# Patient Record
Sex: FEMALE | Race: BLACK OR AFRICAN AMERICAN | Employment: UNEMPLOYED
[De-identification: names, ages, dates, MRNs, and addresses within clinical notes are randomized per-mention and may not be internally consistent; named-entity substitution may affect disease eponyms.]

---

## 2017-01-12 ENCOUNTER — OFFICE VISIT (OUTPATIENT)
Dept: OBGYN | Facility: CLINIC | Age: 27
End: 2017-01-12

## 2017-01-12 VITALS
HEART RATE: 78 BPM | DIASTOLIC BLOOD PRESSURE: 68 MMHG | SYSTOLIC BLOOD PRESSURE: 114 MMHG | HEIGHT: 65 IN | BODY MASS INDEX: 21.66 KG/M2 | WEIGHT: 130 LBS

## 2017-01-12 DIAGNOSIS — R10.2 PELVIC PAIN IN FEMALE: Primary | ICD-10-CM

## 2017-01-12 PROCEDURE — 99213 OFFICE O/P EST LOW 20 MIN: CPT | Performed by: NURSE PRACTITIONER

## 2018-03-05 ENCOUNTER — TELEPHONE (OUTPATIENT)
Dept: OBGYN CLINIC | Age: 28
End: 2018-03-05

## 2018-03-05 DIAGNOSIS — N92.6 MISSED PERIOD: Primary | ICD-10-CM

## 2018-03-14 ENCOUNTER — HOSPITAL ENCOUNTER (EMERGENCY)
Age: 28
Discharge: HOME | End: 2018-03-14
Attending: EMERGENCY MEDICINE
Payer: MEDICAID

## 2018-03-14 ENCOUNTER — APPOINTMENT (OUTPATIENT)
Dept: ULTRASOUND IMAGING | Age: 28
End: 2018-03-14
Payer: MEDICAID

## 2018-03-14 VITALS
DIASTOLIC BLOOD PRESSURE: 73 MMHG | SYSTOLIC BLOOD PRESSURE: 111 MMHG | WEIGHT: 130 LBS | HEART RATE: 89 BPM | TEMPERATURE: 97 F | OXYGEN SATURATION: 98 % | RESPIRATION RATE: 20 BRPM | BODY MASS INDEX: 21.63 KG/M2

## 2018-03-14 DIAGNOSIS — O21.9 VOMITING OF PREGNANCY, ANTEPARTUM: Primary | ICD-10-CM

## 2018-03-14 LAB
-: ABNORMAL
ABSOLUTE EOS #: 0.23 K/UL (ref 0–0.44)
ABSOLUTE IMMATURE GRANULOCYTE: 0.04 K/UL (ref 0–0.3)
ABSOLUTE LYMPH #: 2.72 K/UL (ref 1.1–3.7)
ABSOLUTE MONO #: 0.43 K/UL (ref 0.1–1.2)
ALBUMIN SERPL-MCNC: 4.6 G/DL (ref 3.5–5.2)
ALBUMIN/GLOBULIN RATIO: 1.2 (ref 1–2.5)
ALP BLD-CCNC: 101 U/L (ref 35–104)
ALT SERPL-CCNC: 26 U/L (ref 5–33)
AMORPHOUS: ABNORMAL
ANION GAP SERPL CALCULATED.3IONS-SCNC: 19 MMOL/L (ref 9–17)
AST SERPL-CCNC: 21 U/L
BACTERIA: ABNORMAL
BASOPHILS # BLD: 0 % (ref 0–2)
BASOPHILS ABSOLUTE: 0.03 K/UL (ref 0–0.2)
BILIRUB SERPL-MCNC: 0.24 MG/DL (ref 0.3–1.2)
BILIRUBIN DIRECT: <0.08 MG/DL
BILIRUBIN URINE: NEGATIVE
BILIRUBIN, INDIRECT: ABNORMAL MG/DL (ref 0–1)
BUN BLDV-MCNC: 10 MG/DL (ref 6–20)
BUN/CREAT BLD: ABNORMAL (ref 9–20)
CALCIUM SERPL-MCNC: 10.6 MG/DL (ref 8.6–10.4)
CASTS UA: ABNORMAL /LPF (ref 0–2)
CHLORIDE BLD-SCNC: 102 MMOL/L (ref 98–107)
CO2: 19 MMOL/L (ref 20–31)
COLOR: ABNORMAL
COMMENT UA: ABNORMAL
CREAT SERPL-MCNC: 0.61 MG/DL (ref 0.5–0.9)
CRYSTALS, UA: ABNORMAL /HPF
DIFFERENTIAL TYPE: ABNORMAL
DIRECT EXAM: NORMAL
EOSINOPHILS RELATIVE PERCENT: 2 % (ref 1–4)
EPITHELIAL CELLS UA: ABNORMAL /HPF (ref 0–5)
GFR AFRICAN AMERICAN: >60 ML/MIN
GFR NON-AFRICAN AMERICAN: >60 ML/MIN
GFR SERPL CREATININE-BSD FRML MDRD: ABNORMAL ML/MIN/{1.73_M2}
GFR SERPL CREATININE-BSD FRML MDRD: ABNORMAL ML/MIN/{1.73_M2}
GLOBULIN: ABNORMAL G/DL (ref 1.5–3.8)
GLUCOSE BLD-MCNC: 113 MG/DL (ref 70–99)
GLUCOSE URINE: NEGATIVE
HCG QUALITATIVE: POSITIVE
HCG QUANTITATIVE: ABNORMAL IU/L
HCT VFR BLD CALC: 47.4 % (ref 36.3–47.1)
HEMOGLOBIN: 15.5 G/DL (ref 11.9–15.1)
IMMATURE GRANULOCYTES: 0 %
KETONES, URINE: ABNORMAL
LEUKOCYTE ESTERASE, URINE: ABNORMAL
LIPASE: 31 U/L (ref 13–60)
LYMPHOCYTES # BLD: 28 % (ref 24–43)
Lab: NORMAL
MCH RBC QN AUTO: 29.5 PG (ref 25.2–33.5)
MCHC RBC AUTO-ENTMCNC: 32.7 G/DL (ref 28.4–34.8)
MCV RBC AUTO: 90.1 FL (ref 82.6–102.9)
MONOCYTES # BLD: 5 % (ref 3–12)
MUCUS: ABNORMAL
NITRITE, URINE: NEGATIVE
NRBC AUTOMATED: 0 PER 100 WBC
OTHER OBSERVATIONS UA: ABNORMAL
PDW BLD-RTO: 13.4 % (ref 11.8–14.4)
PH UA: 5.5 (ref 5–8)
PLATELET # BLD: 292 K/UL (ref 138–453)
PLATELET ESTIMATE: ABNORMAL
PMV BLD AUTO: 10.5 FL (ref 8.1–13.5)
POTASSIUM SERPL-SCNC: 3.9 MMOL/L (ref 3.7–5.3)
PROTEIN UA: ABNORMAL
RBC # BLD: 5.26 M/UL (ref 3.95–5.11)
RBC # BLD: ABNORMAL 10*6/UL
RBC UA: ABNORMAL /HPF (ref 0–2)
RENAL EPITHELIAL, UA: ABNORMAL /HPF
SEG NEUTROPHILS: 65 % (ref 36–65)
SEGMENTED NEUTROPHILS ABSOLUTE COUNT: 6.21 K/UL (ref 1.5–8.1)
SODIUM BLD-SCNC: 140 MMOL/L (ref 135–144)
SPECIFIC GRAVITY UA: 1.03 (ref 1–1.03)
SPECIMEN DESCRIPTION: NORMAL
STATUS: NORMAL
TOTAL PROTEIN: 8.6 G/DL (ref 6.4–8.3)
TRICHOMONAS: ABNORMAL
TURBIDITY: ABNORMAL
URINE HGB: NEGATIVE
UROBILINOGEN, URINE: NORMAL
WBC # BLD: 9.7 K/UL (ref 3.5–11.3)
WBC # BLD: ABNORMAL 10*3/UL
WBC UA: ABNORMAL /HPF (ref 0–5)
YEAST: ABNORMAL

## 2018-03-14 PROCEDURE — 85025 COMPLETE CBC W/AUTO DIFF WBC: CPT

## 2018-03-14 PROCEDURE — 87086 URINE CULTURE/COLONY COUNT: CPT

## 2018-03-14 PROCEDURE — 87591 N.GONORRHOEAE DNA AMP PROB: CPT

## 2018-03-14 PROCEDURE — 87480 CANDIDA DNA DIR PROBE: CPT

## 2018-03-14 PROCEDURE — 83690 ASSAY OF LIPASE: CPT

## 2018-03-14 PROCEDURE — 2580000003 HC RX 258: Performed by: EMERGENCY MEDICINE

## 2018-03-14 PROCEDURE — 6370000000 HC RX 637 (ALT 250 FOR IP): Performed by: EMERGENCY MEDICINE

## 2018-03-14 PROCEDURE — 99284 EMERGENCY DEPT VISIT MOD MDM: CPT

## 2018-03-14 PROCEDURE — 87510 GARDNER VAG DNA DIR PROBE: CPT

## 2018-03-14 PROCEDURE — 80076 HEPATIC FUNCTION PANEL: CPT

## 2018-03-14 PROCEDURE — 84702 CHORIONIC GONADOTROPIN TEST: CPT

## 2018-03-14 PROCEDURE — 87660 TRICHOMONAS VAGIN DIR PROBE: CPT

## 2018-03-14 PROCEDURE — 81001 URINALYSIS AUTO W/SCOPE: CPT

## 2018-03-14 PROCEDURE — 87491 CHLMYD TRACH DNA AMP PROBE: CPT

## 2018-03-14 PROCEDURE — 6360000002 HC RX W HCPCS: Performed by: EMERGENCY MEDICINE

## 2018-03-14 PROCEDURE — 76817 TRANSVAGINAL US OBSTETRIC: CPT

## 2018-03-14 PROCEDURE — 96372 THER/PROPH/DIAG INJ SC/IM: CPT

## 2018-03-14 PROCEDURE — 84703 CHORIONIC GONADOTROPIN ASSAY: CPT

## 2018-03-14 PROCEDURE — 80048 BASIC METABOLIC PNL TOTAL CA: CPT

## 2018-03-14 PROCEDURE — 96374 THER/PROPH/DIAG INJ IV PUSH: CPT

## 2018-03-14 RX ORDER — 0.9 % SODIUM CHLORIDE 0.9 %
1000 INTRAVENOUS SOLUTION INTRAVENOUS ONCE
Status: COMPLETED | OUTPATIENT
Start: 2018-03-14 | End: 2018-03-14

## 2018-03-14 RX ORDER — ACETAMINOPHEN 325 MG/1
650 TABLET ORAL ONCE
Status: COMPLETED | OUTPATIENT
Start: 2018-03-14 | End: 2018-03-14

## 2018-03-14 RX ORDER — LANOLIN ALCOHOL/MO/W.PET/CERES
50 CREAM (GRAM) TOPICAL DAILY
Qty: 14 TABLET | Refills: 0 | Status: SHIPPED | OUTPATIENT
Start: 2018-03-14 | End: 2018-07-23 | Stop reason: SDUPTHER

## 2018-03-14 RX ORDER — PRENATAL NO.42/FOLIC ACID 1.4 MG
1 TABLET CHEW,IMMED AND DELAY REL,BIPHASE ORAL DAILY
Qty: 30 TABLET | Refills: 0 | Status: SHIPPED | OUTPATIENT
Start: 2018-03-14 | End: 2018-05-24 | Stop reason: SDUPTHER

## 2018-03-14 RX ORDER — ACETAMINOPHEN 500 MG
500 TABLET ORAL EVERY 6 HOURS PRN
Qty: 120 TABLET | Refills: 0 | Status: SHIPPED | OUTPATIENT
Start: 2018-03-14 | End: 2018-04-09 | Stop reason: SDUPTHER

## 2018-03-14 RX ORDER — DICYCLOMINE HYDROCHLORIDE 10 MG/ML
20 INJECTION INTRAMUSCULAR ONCE
Status: COMPLETED | OUTPATIENT
Start: 2018-03-14 | End: 2018-03-14

## 2018-03-14 RX ORDER — ONDANSETRON 2 MG/ML
INJECTION INTRAMUSCULAR; INTRAVENOUS
Status: DISCONTINUED
Start: 2018-03-14 | End: 2018-03-14 | Stop reason: HOSPADM

## 2018-03-14 RX ORDER — CEPHALEXIN 250 MG/1
500 CAPSULE ORAL ONCE
Status: COMPLETED | OUTPATIENT
Start: 2018-03-14 | End: 2018-03-14

## 2018-03-14 RX ORDER — ONDANSETRON 2 MG/ML
4 INJECTION INTRAMUSCULAR; INTRAVENOUS ONCE
Status: COMPLETED | OUTPATIENT
Start: 2018-03-14 | End: 2018-03-14

## 2018-03-14 RX ADMIN — CEPHALEXIN 500 MG: 250 CAPSULE ORAL at 13:01

## 2018-03-14 RX ADMIN — ACETAMINOPHEN 650 MG: 325 TABLET ORAL at 13:01

## 2018-03-14 RX ADMIN — ONDANSETRON 4 MG: 2 INJECTION INTRAMUSCULAR; INTRAVENOUS at 09:47

## 2018-03-14 RX ADMIN — DICYCLOMINE HYDROCHLORIDE 20 MG: 20 INJECTION, SOLUTION INTRAMUSCULAR at 09:47

## 2018-03-14 RX ADMIN — SODIUM CHLORIDE 1000 ML: 9 INJECTION, SOLUTION INTRAVENOUS at 09:47

## 2018-03-14 ASSESSMENT — ENCOUNTER SYMPTOMS
EYE PAIN: 0
DIARRHEA: 1
SHORTNESS OF BREATH: 0
NAUSEA: 1
BLOOD IN STOOL: 0
EYE DISCHARGE: 0
BACK PAIN: 0
COUGH: 0
RHINORRHEA: 0
VOMITING: 1
ABDOMINAL PAIN: 1
SORE THROAT: 0
COLOR CHANGE: 0

## 2018-03-14 ASSESSMENT — PAIN SCALES - GENERAL
PAINLEVEL_OUTOF10: 9
PAINLEVEL_OUTOF10: 8

## 2018-03-14 ASSESSMENT — PAIN DESCRIPTION - ORIENTATION: ORIENTATION: LOWER;MID

## 2018-03-14 ASSESSMENT — PAIN DESCRIPTION - PAIN TYPE: TYPE: ACUTE PAIN

## 2018-03-14 ASSESSMENT — PAIN DESCRIPTION - DESCRIPTORS: DESCRIPTORS: DISCOMFORT

## 2018-03-14 NOTE — ED NOTES
Bed: 08  Expected date:   Expected time:   Means of arrival:   Comments:  Rehan Jules, RN  03/14/18 2070

## 2018-03-15 LAB
C TRACH DNA GENITAL QL NAA+PROBE: NEGATIVE
CULTURE: NORMAL
CULTURE: NORMAL
Lab: NORMAL
N. GONORRHOEAE DNA: ABNORMAL
SPECIMEN DESCRIPTION: NORMAL
STATUS: NORMAL

## 2018-04-09 ENCOUNTER — HOSPITAL ENCOUNTER (OUTPATIENT)
Age: 28
Discharge: HOME | End: 2018-04-09
Payer: MEDICAID

## 2018-04-09 ENCOUNTER — INITIAL PRENATAL (OUTPATIENT)
Dept: OBGYN CLINIC | Age: 28
End: 2018-04-09
Payer: MEDICAID

## 2018-04-09 ENCOUNTER — HOSPITAL ENCOUNTER (OUTPATIENT)
Age: 28
Setting detail: SPECIMEN
Discharge: HOME | End: 2018-04-09
Payer: MEDICAID

## 2018-04-09 VITALS
BODY MASS INDEX: 24.3 KG/M2 | SYSTOLIC BLOOD PRESSURE: 114 MMHG | WEIGHT: 146 LBS | DIASTOLIC BLOOD PRESSURE: 70 MMHG | HEART RATE: 72 BPM

## 2018-04-09 DIAGNOSIS — Z3A.14 14 WEEKS GESTATION OF PREGNANCY: ICD-10-CM

## 2018-04-09 DIAGNOSIS — O99.332 MATERNAL TOBACCO USE IN SECOND TRIMESTER: ICD-10-CM

## 2018-04-09 DIAGNOSIS — Z3A.14 14 WEEKS GESTATION OF PREGNANCY: Primary | ICD-10-CM

## 2018-04-09 DIAGNOSIS — O09.292 HX OF PREECLAMPSIA, PRIOR PREGNANCY, CURRENTLY PREGNANT, SECOND TRIMESTER: ICD-10-CM

## 2018-04-09 LAB
-: ABNORMAL
ABO/RH: NORMAL
ABSOLUTE EOS #: 0.2 K/UL (ref 0–0.4)
ABSOLUTE IMMATURE GRANULOCYTE: NORMAL K/UL (ref 0–0.3)
ABSOLUTE LYMPH #: 2.5 K/UL (ref 1–4.8)
ABSOLUTE MONO #: 0.3 K/UL (ref 0.1–1.3)
AMORPHOUS: ABNORMAL
ANTIBODY SCREEN: NEGATIVE
BACTERIA: ABNORMAL
BASOPHILS # BLD: 0 % (ref 0–2)
BASOPHILS ABSOLUTE: 0 K/UL (ref 0–0.2)
BILIRUBIN URINE: ABNORMAL
CASTS UA: ABNORMAL /LPF
COLOR: ABNORMAL
COMMENT UA: ABNORMAL
CRYSTALS, UA: ABNORMAL /HPF
DIFFERENTIAL TYPE: NORMAL
EOSINOPHILS RELATIVE PERCENT: 3 % (ref 0–4)
EPITHELIAL CELLS UA: ABNORMAL /HPF
GLUCOSE BLD-MCNC: 82 MG/DL (ref 70–99)
GLUCOSE URINE: NEGATIVE
HCG QUANTITATIVE: ABNORMAL IU/L
HCT VFR BLD CALC: 39.5 % (ref 36–46)
HEMOGLOBIN: 13.3 G/DL (ref 12–16)
HEPATITIS B SURFACE ANTIGEN: NONREACTIVE
HIV AG/AB: NONREACTIVE
IMMATURE GRANULOCYTES: NORMAL %
KETONES, URINE: ABNORMAL
LEUKOCYTE ESTERASE, URINE: ABNORMAL
LYMPHOCYTES # BLD: 42 % (ref 24–44)
MCH RBC QN AUTO: 30.6 PG (ref 26–34)
MCHC RBC AUTO-ENTMCNC: 33.6 G/DL (ref 31–37)
MCV RBC AUTO: 90.9 FL (ref 80–100)
MONOCYTES # BLD: 5 % (ref 1–7)
MUCUS: ABNORMAL
NITRITE, URINE: NEGATIVE
NRBC AUTOMATED: NORMAL PER 100 WBC
OTHER OBSERVATIONS UA: ABNORMAL
PDW BLD-RTO: 13.3 % (ref 11.5–14.9)
PH UA: 6 (ref 5–8)
PLATELET # BLD: 240 K/UL (ref 150–450)
PLATELET ESTIMATE: NORMAL
PMV BLD AUTO: 8.9 FL (ref 6–12)
PROTEIN UA: ABNORMAL
RBC # BLD: 4.34 M/UL (ref 4–5.2)
RBC # BLD: NORMAL 10*6/UL
RBC UA: ABNORMAL /HPF
RENAL EPITHELIAL, UA: ABNORMAL /HPF
RUBV IGG SER QL: 337 IU/ML
SEG NEUTROPHILS: 50 % (ref 36–66)
SEGMENTED NEUTROPHILS ABSOLUTE COUNT: 3 K/UL (ref 1.3–9.1)
SPECIFIC GRAVITY UA: 1.03 (ref 1–1.03)
T. PALLIDUM, IGG: NONREACTIVE
TRICHOMONAS: ABNORMAL
TSH SERPL DL<=0.05 MIU/L-ACNC: 0.41 MIU/L (ref 0.3–5)
TURBIDITY: ABNORMAL
URINE HGB: NEGATIVE
UROBILINOGEN, URINE: NORMAL
WBC # BLD: 6 K/UL (ref 3.5–11)
WBC # BLD: NORMAL 10*3/UL
WBC UA: ABNORMAL /HPF
YEAST: ABNORMAL

## 2018-04-09 PROCEDURE — 86777 TOXOPLASMA ANTIBODY: CPT

## 2018-04-09 PROCEDURE — 86403 PARTICLE AGGLUT ANTBDY SCRN: CPT

## 2018-04-09 PROCEDURE — 87070 CULTURE OTHR SPECIMN AEROBIC: CPT

## 2018-04-09 PROCEDURE — 86850 RBC ANTIBODY SCREEN: CPT

## 2018-04-09 PROCEDURE — 87340 HEPATITIS B SURFACE AG IA: CPT

## 2018-04-09 PROCEDURE — 85025 COMPLETE CBC W/AUTO DIFF WBC: CPT

## 2018-04-09 PROCEDURE — 84702 CHORIONIC GONADOTROPIN TEST: CPT

## 2018-04-09 PROCEDURE — 36415 COLL VENOUS BLD VENIPUNCTURE: CPT

## 2018-04-09 PROCEDURE — 86780 TREPONEMA PALLIDUM: CPT

## 2018-04-09 PROCEDURE — 99213 OFFICE O/P EST LOW 20 MIN: CPT | Performed by: ADVANCED PRACTICE MIDWIFE

## 2018-04-09 PROCEDURE — G0145 SCR C/V CYTO,THINLAYER,RESCR: HCPCS

## 2018-04-09 PROCEDURE — 82947 ASSAY GLUCOSE BLOOD QUANT: CPT

## 2018-04-09 PROCEDURE — 86778 TOXOPLASMA ANTIBODY IGM: CPT

## 2018-04-09 PROCEDURE — 81001 URINALYSIS AUTO W/SCOPE: CPT

## 2018-04-09 PROCEDURE — 87086 URINE CULTURE/COLONY COUNT: CPT

## 2018-04-09 PROCEDURE — 87389 HIV-1 AG W/HIV-1&-2 AB AG IA: CPT

## 2018-04-09 PROCEDURE — 86900 BLOOD TYPING SEROLOGIC ABO: CPT

## 2018-04-09 PROCEDURE — 87491 CHLMYD TRACH DNA AMP PROBE: CPT

## 2018-04-09 PROCEDURE — 87591 N.GONORRHOEAE DNA AMP PROB: CPT

## 2018-04-09 PROCEDURE — 86901 BLOOD TYPING SEROLOGIC RH(D): CPT

## 2018-04-09 PROCEDURE — 84443 ASSAY THYROID STIM HORMONE: CPT

## 2018-04-09 PROCEDURE — 86762 RUBELLA ANTIBODY: CPT

## 2018-04-09 RX ORDER — PYRIDOXINE HCL (VITAMIN B6) 50 MG
1 TABLET ORAL 2 TIMES DAILY
Qty: 60 TABLET | Refills: 2 | Status: SHIPPED | OUTPATIENT
Start: 2018-04-09 | End: 2018-10-15

## 2018-04-09 RX ORDER — ASPIRIN 81 MG/1
81 TABLET ORAL DAILY
Qty: 90 TABLET | Refills: 1 | Status: SHIPPED | OUTPATIENT
Start: 2018-04-09 | End: 2018-10-15

## 2018-04-10 ENCOUNTER — TELEPHONE (OUTPATIENT)
Dept: OBGYN CLINIC | Age: 28
End: 2018-04-10

## 2018-04-10 PROBLEM — O98.212 MATERNAL GONORRHEA IN SECOND TRIMESTER: Status: ACTIVE | Noted: 2018-04-10

## 2018-04-10 LAB
C TRACH DNA GENITAL QL NAA+PROBE: NEGATIVE
CULTURE: NORMAL
CULTURE: NORMAL
Lab: NORMAL
N. GONORRHOEAE DNA: ABNORMAL
SPECIMEN DESCRIPTION: NORMAL
SPECIMEN DESCRIPTION: NORMAL
STATUS: NORMAL
TOXOPLASM IGM: 0.47 INDEX
TOXOPLASMA BLOOD FOR RATIO: <0.5 IU/ML

## 2018-04-13 PROBLEM — O99.820 GBS (GROUP B STREPTOCOCCUS CARRIER), +RV CULTURE, CURRENTLY PREGNANT: Status: ACTIVE | Noted: 2018-04-13

## 2018-04-13 LAB
CULTURE: ABNORMAL
Lab: ABNORMAL
SPECIMEN DESCRIPTION: ABNORMAL
SPECIMEN DESCRIPTION: ABNORMAL
STATUS: ABNORMAL

## 2018-04-16 ENCOUNTER — HOSPITAL ENCOUNTER (EMERGENCY)
Age: 28
Discharge: HOME | End: 2018-04-16
Attending: EMERGENCY MEDICINE
Payer: MEDICAID

## 2018-04-16 VITALS
DIASTOLIC BLOOD PRESSURE: 78 MMHG | BODY MASS INDEX: 22.82 KG/M2 | OXYGEN SATURATION: 99 % | RESPIRATION RATE: 18 BRPM | HEART RATE: 93 BPM | SYSTOLIC BLOOD PRESSURE: 125 MMHG | WEIGHT: 142 LBS | HEIGHT: 66 IN | TEMPERATURE: 99 F

## 2018-04-16 DIAGNOSIS — O98.219 ANTEPARTUM GONORRHEA: Primary | ICD-10-CM

## 2018-04-16 PROCEDURE — 99283 EMERGENCY DEPT VISIT LOW MDM: CPT

## 2018-04-16 PROCEDURE — 6360000002 HC RX W HCPCS: Performed by: EMERGENCY MEDICINE

## 2018-04-16 PROCEDURE — 96372 THER/PROPH/DIAG INJ SC/IM: CPT

## 2018-04-16 PROCEDURE — 6370000000 HC RX 637 (ALT 250 FOR IP): Performed by: EMERGENCY MEDICINE

## 2018-04-16 RX ORDER — CEFTRIAXONE SODIUM 250 MG/1
250 INJECTION, POWDER, FOR SOLUTION INTRAMUSCULAR; INTRAVENOUS ONCE
Status: COMPLETED | OUTPATIENT
Start: 2018-04-16 | End: 2018-04-16

## 2018-04-16 RX ORDER — AZITHROMYCIN 250 MG/1
1000 TABLET, FILM COATED ORAL ONCE
Status: COMPLETED | OUTPATIENT
Start: 2018-04-16 | End: 2018-04-16

## 2018-04-16 RX ADMIN — CEFTRIAXONE SODIUM 250 MG: 250 INJECTION, POWDER, FOR SOLUTION INTRAMUSCULAR; INTRAVENOUS at 20:40

## 2018-04-16 RX ADMIN — AZITHROMYCIN 1000 MG: 250 TABLET, FILM COATED ORAL at 20:40

## 2018-04-16 ASSESSMENT — ENCOUNTER SYMPTOMS
SHORTNESS OF BREATH: 0
COUGH: 0
ABDOMINAL PAIN: 0
BACK PAIN: 0
SORE THROAT: 0

## 2018-04-24 LAB — CYTOLOGY REPORT: NORMAL

## 2018-05-10 ENCOUNTER — TELEPHONE (OUTPATIENT)
Dept: OBGYN CLINIC | Age: 28
End: 2018-05-10

## 2018-05-21 ENCOUNTER — ROUTINE PRENATAL (OUTPATIENT)
Dept: PERINATAL CARE | Age: 28
End: 2018-05-21
Payer: MEDICAID

## 2018-05-21 VITALS
SYSTOLIC BLOOD PRESSURE: 107 MMHG | DIASTOLIC BLOOD PRESSURE: 70 MMHG | WEIGHT: 147 LBS | TEMPERATURE: 97.7 F | RESPIRATION RATE: 18 BRPM | HEART RATE: 99 BPM | HEIGHT: 66 IN | BODY MASS INDEX: 23.63 KG/M2

## 2018-05-21 DIAGNOSIS — O09.299 CURRENT SINGLETON PREGNANCY WITH HISTORY OF CONGENITAL HEART DISEASE IN PRIOR CHILD, ANTEPARTUM: Primary | ICD-10-CM

## 2018-05-21 DIAGNOSIS — Z36.86 ENCOUNTER FOR SCREENING FOR RISK OF PRE-TERM LABOR: ICD-10-CM

## 2018-05-21 DIAGNOSIS — O09.292 HX OF PREECLAMPSIA, PRIOR PREGNANCY, CURRENTLY PREGNANT, SECOND TRIMESTER: ICD-10-CM

## 2018-05-21 DIAGNOSIS — O34.219 PREVIOUS CESAREAN DELIVERY, ANTEPARTUM CONDITION OR COMPLICATION: ICD-10-CM

## 2018-05-21 DIAGNOSIS — O09.292 H/O GESTATIONAL DIABETES IN PRIOR PREGNANCY, CURRENTLY PREGNANT, SECOND TRIMESTER: ICD-10-CM

## 2018-05-21 DIAGNOSIS — Z86.32 H/O GESTATIONAL DIABETES IN PRIOR PREGNANCY, CURRENTLY PREGNANT, SECOND TRIMESTER: ICD-10-CM

## 2018-05-21 DIAGNOSIS — Z3A.20 20 WEEKS GESTATION OF PREGNANCY: ICD-10-CM

## 2018-05-21 DIAGNOSIS — F14.10 COCAINE ABUSE AFFECTING PREGNANCY IN SECOND TRIMESTER (HCC): ICD-10-CM

## 2018-05-21 DIAGNOSIS — O99.322 COCAINE ABUSE AFFECTING PREGNANCY IN SECOND TRIMESTER (HCC): ICD-10-CM

## 2018-05-21 PROCEDURE — 76817 TRANSVAGINAL US OBSTETRIC: CPT | Performed by: OBSTETRICS & GYNECOLOGY

## 2018-05-21 PROCEDURE — 76811 OB US DETAILED SNGL FETUS: CPT | Performed by: OBSTETRICS & GYNECOLOGY

## 2018-05-24 ENCOUNTER — HOSPITAL ENCOUNTER (OUTPATIENT)
Age: 28
Setting detail: SPECIMEN
Discharge: HOME | End: 2018-05-24
Payer: MEDICAID

## 2018-05-24 ENCOUNTER — ROUTINE PRENATAL (OUTPATIENT)
Dept: OBGYN CLINIC | Age: 28
End: 2018-05-24
Payer: MEDICAID

## 2018-05-24 VITALS
HEART RATE: 82 BPM | SYSTOLIC BLOOD PRESSURE: 112 MMHG | BODY MASS INDEX: 23.4 KG/M2 | WEIGHT: 145 LBS | DIASTOLIC BLOOD PRESSURE: 64 MMHG

## 2018-05-24 DIAGNOSIS — O98.212 MATERNAL GONORRHEA IN SECOND TRIMESTER: ICD-10-CM

## 2018-05-24 DIAGNOSIS — Z87.74 HISTORY OF CONGENITAL HEART DEFECT: Primary | ICD-10-CM

## 2018-05-24 DIAGNOSIS — N94.9 VAGINAL BURNING: ICD-10-CM

## 2018-05-24 DIAGNOSIS — R39.9 UTI SYMPTOMS: ICD-10-CM

## 2018-05-24 DIAGNOSIS — Z3A.20 20 WEEKS GESTATION OF PREGNANCY: Primary | ICD-10-CM

## 2018-05-24 DIAGNOSIS — O09.299 CURRENT SINGLETON PREGNANCY WITH HISTORY OF CONGENITAL HEART DISEASE IN PRIOR CHILD, ANTEPARTUM: ICD-10-CM

## 2018-05-24 DIAGNOSIS — O09.92 PREGNANCY, HIGH-RISK, SECOND TRIMESTER: ICD-10-CM

## 2018-05-24 DIAGNOSIS — O99.820 GBS (GROUP B STREPTOCOCCUS CARRIER), +RV CULTURE, CURRENTLY PREGNANT: ICD-10-CM

## 2018-05-24 LAB
-: ABNORMAL
AMORPHOUS: ABNORMAL
BACTERIA: ABNORMAL
BILIRUBIN URINE: ABNORMAL
CASTS UA: ABNORMAL /LPF
COLOR: ABNORMAL
COMMENT UA: ABNORMAL
CRYSTALS, UA: ABNORMAL /HPF
DIRECT EXAM: ABNORMAL
EPITHELIAL CELLS UA: ABNORMAL /HPF
GLUCOSE URINE: NEGATIVE
KETONES, URINE: ABNORMAL
LEUKOCYTE ESTERASE, URINE: ABNORMAL
Lab: ABNORMAL
MUCUS: ABNORMAL
NITRITE, URINE: NEGATIVE
OTHER OBSERVATIONS UA: ABNORMAL
PH UA: 6 (ref 5–8)
PROTEIN UA: ABNORMAL
RBC UA: ABNORMAL /HPF
RENAL EPITHELIAL, UA: ABNORMAL /HPF
SPECIFIC GRAVITY UA: 1.04 (ref 1–1.03)
SPECIMEN DESCRIPTION: ABNORMAL
STATUS: ABNORMAL
TRICHOMONAS: ABNORMAL
TURBIDITY: ABNORMAL
URINE HGB: ABNORMAL
UROBILINOGEN, URINE: NORMAL
WBC UA: ABNORMAL /HPF
YEAST: ABNORMAL

## 2018-05-24 PROCEDURE — 87510 GARDNER VAG DNA DIR PROBE: CPT

## 2018-05-24 PROCEDURE — 4004F PT TOBACCO SCREEN RCVD TLK: CPT | Performed by: NURSE PRACTITIONER

## 2018-05-24 PROCEDURE — 87591 N.GONORRHOEAE DNA AMP PROB: CPT

## 2018-05-24 PROCEDURE — 87086 URINE CULTURE/COLONY COUNT: CPT

## 2018-05-24 PROCEDURE — 87480 CANDIDA DNA DIR PROBE: CPT

## 2018-05-24 PROCEDURE — 87186 SC STD MICRODIL/AGAR DIL: CPT

## 2018-05-24 PROCEDURE — G8427 DOCREV CUR MEDS BY ELIG CLIN: HCPCS | Performed by: NURSE PRACTITIONER

## 2018-05-24 PROCEDURE — 99213 OFFICE O/P EST LOW 20 MIN: CPT | Performed by: NURSE PRACTITIONER

## 2018-05-24 PROCEDURE — G8420 CALC BMI NORM PARAMETERS: HCPCS | Performed by: NURSE PRACTITIONER

## 2018-05-24 PROCEDURE — 87077 CULTURE AEROBIC IDENTIFY: CPT

## 2018-05-24 PROCEDURE — 87491 CHLMYD TRACH DNA AMP PROBE: CPT

## 2018-05-24 PROCEDURE — 87660 TRICHOMONAS VAGIN DIR PROBE: CPT

## 2018-05-24 PROCEDURE — 81001 URINALYSIS AUTO W/SCOPE: CPT

## 2018-05-24 RX ORDER — PRENATAL NO.42/FOLIC ACID 1.4 MG
1 TABLET CHEW,IMMED AND DELAY REL,BIPHASE ORAL DAILY
Qty: 30 TABLET | Refills: 11 | Status: SHIPPED | OUTPATIENT
Start: 2018-05-24 | End: 2018-06-23

## 2018-05-25 ENCOUNTER — TELEPHONE (OUTPATIENT)
Dept: OBGYN CLINIC | Age: 28
End: 2018-05-25

## 2018-05-25 DIAGNOSIS — O09.93 HIGH-RISK PREGNANCY SUPERVISION, THIRD TRIMESTER: Primary | ICD-10-CM

## 2018-05-25 LAB
C TRACH DNA GENITAL QL NAA+PROBE: NEGATIVE
N. GONORRHOEAE DNA: NEGATIVE

## 2018-05-25 RX ORDER — METRONIDAZOLE 500 MG/1
500 TABLET ORAL 2 TIMES DAILY
Qty: 14 TABLET | Refills: 0 | Status: SHIPPED | OUTPATIENT
Start: 2018-05-25 | End: 2018-06-01

## 2018-05-26 LAB
CULTURE: ABNORMAL
Lab: ABNORMAL
ORGANISM: ABNORMAL
SPECIMEN DESCRIPTION: ABNORMAL
SPECIMEN DESCRIPTION: ABNORMAL
STATUS: ABNORMAL

## 2018-05-29 PROBLEM — O23.40 UTI IN PREGNANCY: Status: ACTIVE | Noted: 2018-05-29

## 2018-05-30 ENCOUNTER — TELEPHONE (OUTPATIENT)
Dept: OBGYN CLINIC | Age: 28
End: 2018-05-30

## 2018-07-11 ENCOUNTER — ROUTINE PRENATAL (OUTPATIENT)
Dept: OBGYN CLINIC | Age: 28
End: 2018-07-11
Payer: MEDICAID

## 2018-07-11 VITALS
HEART RATE: 80 BPM | WEIGHT: 152 LBS | DIASTOLIC BLOOD PRESSURE: 62 MMHG | SYSTOLIC BLOOD PRESSURE: 108 MMHG | BODY MASS INDEX: 24.53 KG/M2

## 2018-07-11 DIAGNOSIS — F14.10 COCAINE ABUSE AFFECTING PREGNANCY IN SECOND TRIMESTER (HCC): ICD-10-CM

## 2018-07-11 DIAGNOSIS — Z86.19 HISTORY OF CHLAMYDIA: ICD-10-CM

## 2018-07-11 DIAGNOSIS — Z98.891 H/O: C-SECTION: ICD-10-CM

## 2018-07-11 DIAGNOSIS — O09.299 CURRENT SINGLETON PREGNANCY WITH HISTORY OF CONGENITAL HEART DISEASE IN PRIOR CHILD, ANTEPARTUM: ICD-10-CM

## 2018-07-11 DIAGNOSIS — O09.92 HIGH-RISK PREGNANCY IN SECOND TRIMESTER: Primary | ICD-10-CM

## 2018-07-11 DIAGNOSIS — O99.820 GBS (GROUP B STREPTOCOCCUS CARRIER), +RV CULTURE, CURRENTLY PREGNANT: ICD-10-CM

## 2018-07-11 DIAGNOSIS — O23.42 URINARY TRACT INFECTION IN MOTHER DURING SECOND TRIMESTER OF PREGNANCY: ICD-10-CM

## 2018-07-11 DIAGNOSIS — Z23 NEED FOR PROPHYLACTIC VACCINATION WITH COMBINED DIPHTHERIA-TETANUS-PERTUSSIS (DTP) VACCINE: ICD-10-CM

## 2018-07-11 DIAGNOSIS — Z87.74 HISTORY OF CONGENITAL HEART DEFECT: ICD-10-CM

## 2018-07-11 DIAGNOSIS — Z83.3 FAMILY HISTORY OF DIABETES MELLITUS IN MOTHER: ICD-10-CM

## 2018-07-11 DIAGNOSIS — Z98.891 STATUS POST REPEAT LOW TRANSVERSE CESAREAN SECTION: ICD-10-CM

## 2018-07-11 DIAGNOSIS — O99.332 MATERNAL TOBACCO USE IN SECOND TRIMESTER: ICD-10-CM

## 2018-07-11 DIAGNOSIS — F12.90 MARIJUANA SMOKER: ICD-10-CM

## 2018-07-11 DIAGNOSIS — O98.212 MATERNAL GONORRHEA IN SECOND TRIMESTER: ICD-10-CM

## 2018-07-11 DIAGNOSIS — Z3A.27 27 WEEKS GESTATION OF PREGNANCY: ICD-10-CM

## 2018-07-11 DIAGNOSIS — O09.292 HX OF PREECLAMPSIA, PRIOR PREGNANCY, CURRENTLY PREGNANT, SECOND TRIMESTER: ICD-10-CM

## 2018-07-11 DIAGNOSIS — O99.322 COCAINE ABUSE AFFECTING PREGNANCY IN SECOND TRIMESTER (HCC): ICD-10-CM

## 2018-07-11 PROCEDURE — 90471 IMMUNIZATION ADMIN: CPT | Performed by: OBSTETRICS & GYNECOLOGY

## 2018-07-11 PROCEDURE — 4004F PT TOBACCO SCREEN RCVD TLK: CPT | Performed by: OBSTETRICS & GYNECOLOGY

## 2018-07-11 PROCEDURE — G8420 CALC BMI NORM PARAMETERS: HCPCS | Performed by: OBSTETRICS & GYNECOLOGY

## 2018-07-11 PROCEDURE — 90715 TDAP VACCINE 7 YRS/> IM: CPT | Performed by: OBSTETRICS & GYNECOLOGY

## 2018-07-11 PROCEDURE — 99213 OFFICE O/P EST LOW 20 MIN: CPT | Performed by: OBSTETRICS & GYNECOLOGY

## 2018-07-11 PROCEDURE — G8427 DOCREV CUR MEDS BY ELIG CLIN: HCPCS | Performed by: OBSTETRICS & GYNECOLOGY

## 2018-07-11 NOTE — PROGRESS NOTES
evaluate placentation for possible accreta/increta/percreta (anterior placenta with history of 4 prior  sections).  ?congenital heart defect in previous child 2016     Priority: Medium     Fetal echo scheduled at 25 weeks        Family history of diabetes mellitus in mother 2016     Priority: Medium    History of chlamydia 2016     Priority: Medium    Previous  delivery, antepartum condition or complication     Current gavin pregnancy with history of congenital heart disease in prior child, antepartum 2018    Cocaine abuse affecting pregnancy in second trimester 2018    H/O gestational diabetes in prior pregnancy, currently pregnant, second trimester 2018    Retained products of conception after delivery without hemorrhage         Diagnosis Orders   1. High-risk pregnancy in second trimester  Glucose tolerance, 1 hour    CBC Auto Differential    Urine culture clean catch   2. 27 weeks gestation of pregnancy  Glucose tolerance, 1 hour    CBC Auto Differential    Urine culture clean catch   3. Urinary tract infection in mother during second trimester of pregnancy     4. Maternal tobacco use in second trimester     5. Hx of  x 4     6. Marijuana smoker ([address]     7. Hx of preeclampsia, prior pregnancy, currently pregnant, second trimester     8. RLTCS 10/14/16 M Ap/9 Wt: 5#12     9. History of  birth     8. Fetal drug exposure     11. Maternal gonorrhea in second trimester     15. GBS (group B Streptococcus carrier), +RV culture, currently pregnant     13. History of chlamydia     14. Family history of diabetes mellitus in mother     13. ?congenital heart defect in previous child     12. Retained products of conception after delivery without hemorrhage     17. Current gavin pregnancy with history of congenital heart disease in prior child, antepartum     25. Cocaine abuse affecting pregnancy in second trimester     19. Need for prophylactic vaccination with combined diphtheria-tetanus-pertussis (DTP) vaccine  Tdap (age 10y-63y) IM (ADACEL)             Plan:  The patient will return to the office for her next visit in 2 weeks. See antepartum flow sheet.  Testing Indicated: No  Scheduled with Nursing-Pt notified: No      [facility]  [address]  [phone]    [facility]  2018  Patient's last menstrual period was 2018 (exact date). INITIAL OBSTETRICAL VISIT EVALUATION:  The patient was seen full history and physical was completed/reviewed. Cytology was collected for patients over 24years of age. Cultures were collected. The patient was counseled on office policies and she was counseled on termination of pregnancy in the state of [place]. The patient was counseled on Toxoplasmosis, HIV, Tobacco Abuse, Group Beta Strep Infections, Cystic Fibrosis,  Labor precautions and Sickle Cell disease. The patient was counseled on the risks of tobacco abuse. Both maternal and fetal. She was instructed to stop smoking if currently using tobacco. Morbidity, mortality, and cessation programs were reviewed. The risks include but are not limited to increased risks of  labor,  delivery, premature rupture of membranes, intrauterine growth restriction, intrauterine fetal demise and abruptio placenta. Secondary smoke risks were also reviewed. Increases in cancer, respiratory problems, and sudden infant death syndrome were reviewed as well. The patient was informed of a 2-4% risk of congenital anomalies in the general population. She was also informed that karyotyping is the only way to evaluate the fetus for genetic problems and genetic lethal anomalies. Chorionic villous sampling, amniocentesis and Maternal Genetic Blood Sampling-(NIPT Testing) were also discussed with morbidity rates in detail.  She declined any of the options. Route of delivery and counseling on vaginal, operative vaginal, and  sections were completed with the risks of each to both the patient as well as her baby. The possibility of a blood transfusion was discussed as well. The patient was not opposed to receiving a transfusion if needed. Nuchal translucency and MSAFP single marker testing was reviewed in detail with attention to timing of testing and their windows. For patients beyond the gestational age for Nuchal translucency evaluation Quad testing was recommended. Timing for the Quad test was reviewed. Benefits of the above testing was reviewed. A second trimester amniocentesis was also made available to the patient. Risks, Benefits and non-invasive alternative testing was reviewed. The literature regarding a questionable link to pitocin augmentation and induction of labor, the assistance of labor contractions and the initiation of contractions to help delivery, have been reviewed with the patient regarding the increased potential of having a  with Attention Deficit Hyperactivity Disorder and or Autism. These two disorders and the ramifications of their impact on a child and the family caring for that child has been reviewed with the patient in detail. She was given the risks, benefits and alternatives of the use of this medication. She has agreed to its use in the delivery of her unborn child if needed at the time of delivery, Yes. The patient was questioned in detail regarding any genetic misnomer history, chromosomal abnormalities, or learning disabilities in  herself, the father of the baby or their families. SHE DENIED ANY HISTORY AS STATED ABOVE: Yes    Upon completion of the visit all questions were answered and the patients follow-up and testing schedule were reviewed. Prenatal vitamins were given.   MRI 30-32 weeks secondary to previous  x 4

## 2018-07-16 ENCOUNTER — HOSPITAL ENCOUNTER (OUTPATIENT)
Age: 28
Discharge: HOME | End: 2018-07-16
Payer: MEDICAID

## 2018-07-16 DIAGNOSIS — Z3A.27 27 WEEKS GESTATION OF PREGNANCY: ICD-10-CM

## 2018-07-16 DIAGNOSIS — O09.92 HIGH-RISK PREGNANCY IN SECOND TRIMESTER: ICD-10-CM

## 2018-07-16 LAB
ABSOLUTE EOS #: 0.12 K/UL (ref 0–0.44)
ABSOLUTE IMMATURE GRANULOCYTE: 0.1 K/UL (ref 0–0.3)
ABSOLUTE LYMPH #: 2.55 K/UL (ref 1.1–3.7)
ABSOLUTE MONO #: 0.41 K/UL (ref 0.1–1.2)
BASOPHILS # BLD: 0 % (ref 0–2)
BASOPHILS ABSOLUTE: <0.03 K/UL (ref 0–0.2)
DIFFERENTIAL TYPE: ABNORMAL
EOSINOPHILS RELATIVE PERCENT: 1 % (ref 1–4)
GLUCOSE ADMINISTRATION: NORMAL
GLUCOSE TOLERANCE SCREEN 50G: 98 MG/DL (ref 70–135)
HCT VFR BLD CALC: 34 % (ref 36.3–47.1)
HEMOGLOBIN: 11.3 G/DL (ref 11.9–15.1)
IMMATURE GRANULOCYTES: 1 %
LYMPHOCYTES # BLD: 28 % (ref 24–43)
MCH RBC QN AUTO: 31 PG (ref 25.2–33.5)
MCHC RBC AUTO-ENTMCNC: 33.2 G/DL (ref 28.4–34.8)
MCV RBC AUTO: 93.4 FL (ref 82.6–102.9)
MONOCYTES # BLD: 5 % (ref 3–12)
NRBC AUTOMATED: 0 PER 100 WBC
PDW BLD-RTO: 13.4 % (ref 11.8–14.4)
PLATELET # BLD: 191 K/UL (ref 138–453)
PLATELET ESTIMATE: ABNORMAL
PMV BLD AUTO: 10.8 FL (ref 8.1–13.5)
RBC # BLD: 3.64 M/UL (ref 3.95–5.11)
RBC # BLD: ABNORMAL 10*6/UL
SEG NEUTROPHILS: 65 % (ref 36–65)
SEGMENTED NEUTROPHILS ABSOLUTE COUNT: 5.97 K/UL (ref 1.5–8.1)
WBC # BLD: 9.2 K/UL (ref 3.5–11.3)
WBC # BLD: ABNORMAL 10*3/UL

## 2018-07-16 PROCEDURE — 85025 COMPLETE CBC W/AUTO DIFF WBC: CPT

## 2018-07-16 PROCEDURE — 87086 URINE CULTURE/COLONY COUNT: CPT

## 2018-07-16 PROCEDURE — 87186 SC STD MICRODIL/AGAR DIL: CPT

## 2018-07-16 PROCEDURE — 82950 GLUCOSE TEST: CPT

## 2018-07-16 PROCEDURE — 87077 CULTURE AEROBIC IDENTIFY: CPT

## 2018-07-16 PROCEDURE — 36415 COLL VENOUS BLD VENIPUNCTURE: CPT

## 2018-07-17 LAB
CULTURE: ABNORMAL
Lab: ABNORMAL
ORGANISM: ABNORMAL
SPECIMEN DESCRIPTION: ABNORMAL
STATUS: ABNORMAL

## 2018-07-20 ENCOUNTER — TELEPHONE (OUTPATIENT)
Dept: OBGYN CLINIC | Age: 28
End: 2018-07-20

## 2018-07-20 RX ORDER — SULFAMETHOXAZOLE AND TRIMETHOPRIM 800; 160 MG/1; MG/1
1 TABLET ORAL 2 TIMES DAILY
Qty: 20 TABLET | Refills: 0 | Status: SHIPPED | OUTPATIENT
Start: 2018-07-20 | End: 2018-07-30

## 2018-07-20 NOTE — TELEPHONE ENCOUNTER
----- Message from [place], Otis Ulrich sent at 7/18/2018  8:00 AM EDT -----  Bactrim DS # 20 BID po x 10 days

## 2018-07-23 ENCOUNTER — ROUTINE PRENATAL (OUTPATIENT)
Dept: PERINATAL CARE | Age: 28
End: 2018-07-23
Payer: MEDICAID

## 2018-07-23 VITALS
SYSTOLIC BLOOD PRESSURE: 102 MMHG | HEIGHT: 66 IN | WEIGHT: 152 LBS | RESPIRATION RATE: 16 BRPM | BODY MASS INDEX: 24.43 KG/M2 | TEMPERATURE: 98.5 F | DIASTOLIC BLOOD PRESSURE: 66 MMHG | HEART RATE: 88 BPM

## 2018-07-23 DIAGNOSIS — O09.299 CURRENT SINGLETON PREGNANCY WITH HISTORY OF CONGENITAL HEART DISEASE IN PRIOR CHILD, ANTEPARTUM: Primary | ICD-10-CM

## 2018-07-23 DIAGNOSIS — Z3A.29 29 WEEKS GESTATION OF PREGNANCY: ICD-10-CM

## 2018-07-23 DIAGNOSIS — Z13.89 ENCOUNTER FOR ROUTINE SCREENING FOR MALFORMATION USING ULTRASONICS: ICD-10-CM

## 2018-07-23 DIAGNOSIS — Z36.4 ANTENATAL SCREENING FOR FETAL GROWTH RETARDATION USING ULTRASONICS: ICD-10-CM

## 2018-07-23 DIAGNOSIS — O09.293 HX OF PREECLAMPSIA, PRIOR PREGNANCY, CURRENTLY PREGNANT, THIRD TRIMESTER: ICD-10-CM

## 2018-07-23 DIAGNOSIS — O99.322 COCAINE ABUSE AFFECTING PREGNANCY IN SECOND TRIMESTER (HCC): ICD-10-CM

## 2018-07-23 DIAGNOSIS — O34.219 PREVIOUS CESAREAN DELIVERY, ANTEPARTUM CONDITION OR COMPLICATION: ICD-10-CM

## 2018-07-23 DIAGNOSIS — F14.10 COCAINE ABUSE AFFECTING PREGNANCY IN SECOND TRIMESTER (HCC): ICD-10-CM

## 2018-07-23 PROCEDURE — 76819 FETAL BIOPHYS PROFIL W/O NST: CPT | Performed by: OBSTETRICS & GYNECOLOGY

## 2018-07-23 PROCEDURE — 76825 ECHO EXAM OF FETAL HEART: CPT | Performed by: OBSTETRICS & GYNECOLOGY

## 2018-07-23 PROCEDURE — 76827 ECHO EXAM OF FETAL HEART: CPT | Performed by: OBSTETRICS & GYNECOLOGY

## 2018-07-23 PROCEDURE — 76805 OB US >/= 14 WKS SNGL FETUS: CPT | Performed by: OBSTETRICS & GYNECOLOGY

## 2018-07-23 PROCEDURE — 93325 DOPPLER ECHO COLOR FLOW MAPG: CPT | Performed by: OBSTETRICS & GYNECOLOGY

## 2018-07-27 ENCOUNTER — TELEPHONE (OUTPATIENT)
Dept: OBGYN CLINIC | Age: 28
End: 2018-07-27

## 2018-08-02 ENCOUNTER — HOSPITAL ENCOUNTER (EMERGENCY)
Age: 28
Discharge: HOME | End: 2018-08-02
Attending: EMERGENCY MEDICINE
Payer: MEDICAID

## 2018-08-02 ENCOUNTER — HOSPITAL ENCOUNTER (OUTPATIENT)
Age: 28
Discharge: HOME | End: 2018-08-02
Attending: OBSTETRICS & GYNECOLOGY | Admitting: OBSTETRICS & GYNECOLOGY
Payer: MEDICAID

## 2018-08-02 VITALS
WEIGHT: 152 LBS | DIASTOLIC BLOOD PRESSURE: 67 MMHG | HEART RATE: 96 BPM | OXYGEN SATURATION: 100 % | TEMPERATURE: 99 F | BODY MASS INDEX: 25.33 KG/M2 | RESPIRATION RATE: 24 BRPM | SYSTOLIC BLOOD PRESSURE: 114 MMHG | HEIGHT: 65 IN

## 2018-08-02 VITALS
DIASTOLIC BLOOD PRESSURE: 72 MMHG | RESPIRATION RATE: 20 BRPM | HEART RATE: 86 BPM | SYSTOLIC BLOOD PRESSURE: 111 MMHG | TEMPERATURE: 97.3 F

## 2018-08-02 DIAGNOSIS — J06.9 VIRAL URI WITH COUGH: Primary | ICD-10-CM

## 2018-08-02 PROBLEM — Z3A.30 30 WEEKS GESTATION OF PREGNANCY: Status: ACTIVE | Noted: 2018-08-02

## 2018-08-02 PROCEDURE — 6370000000 HC RX 637 (ALT 250 FOR IP): Performed by: EMERGENCY MEDICINE

## 2018-08-02 PROCEDURE — 99213 OFFICE O/P EST LOW 20 MIN: CPT

## 2018-08-02 PROCEDURE — 99283 EMERGENCY DEPT VISIT LOW MDM: CPT

## 2018-08-02 RX ORDER — ACETAMINOPHEN 325 MG/1
650 TABLET ORAL ONCE
Status: COMPLETED | OUTPATIENT
Start: 2018-08-02 | End: 2018-08-02

## 2018-08-02 RX ORDER — SODIUM CHLORIDE/ALOE VERA
GEL (GRAM) NASAL PRN
Qty: 1 TUBE | Refills: 0 | Status: SHIPPED | OUTPATIENT
Start: 2018-08-02 | End: 2018-10-15

## 2018-08-02 RX ORDER — ACETAMINOPHEN 325 MG/1
650 TABLET ORAL EVERY 6 HOURS PRN
Qty: 30 TABLET | Refills: 0 | Status: SHIPPED | OUTPATIENT
Start: 2018-08-02

## 2018-08-02 RX ORDER — DIPHENHYDRAMINE HCL 25 MG
25 TABLET ORAL ONCE
Status: COMPLETED | OUTPATIENT
Start: 2018-08-02 | End: 2018-08-02

## 2018-08-02 RX ORDER — DIPHENHYDRAMINE HCL 25 MG
25 CAPSULE ORAL EVERY 6 HOURS PRN
Qty: 30 CAPSULE | Refills: 0 | Status: SHIPPED | OUTPATIENT
Start: 2018-08-02 | End: 2018-08-12

## 2018-08-02 RX ADMIN — ACETAMINOPHEN 650 MG: 325 TABLET ORAL at 21:58

## 2018-08-02 RX ADMIN — DIPHENHYDRAMINE HCL 25 MG: 25 TABLET ORAL at 21:58

## 2018-08-02 ASSESSMENT — PAIN SCALES - GENERAL
PAINLEVEL_OUTOF10: 7
PAINLEVEL_OUTOF10: 9

## 2018-08-02 ASSESSMENT — PAIN DESCRIPTION - DESCRIPTORS: DESCRIPTORS: ACHING

## 2018-08-02 ASSESSMENT — PAIN DESCRIPTION - PAIN TYPE: TYPE: ACUTE PAIN

## 2018-08-02 ASSESSMENT — PAIN DESCRIPTION - LOCATION: LOCATION: CHEST

## 2018-08-02 ASSESSMENT — PAIN DESCRIPTION - ONSET: ONSET: GRADUAL

## 2018-08-02 ASSESSMENT — PAIN DESCRIPTION - FREQUENCY: FREQUENCY: CONTINUOUS

## 2018-08-02 ASSESSMENT — PAIN DESCRIPTION - PROGRESSION: CLINICAL_PROGRESSION: GRADUALLY WORSENING

## 2018-08-03 ASSESSMENT — ENCOUNTER SYMPTOMS
SHORTNESS OF BREATH: 0
ABDOMINAL PAIN: 0
BACK PAIN: 0
NAUSEA: 0
SORE THROAT: 1
VOICE CHANGE: 0
TROUBLE SWALLOWING: 0
SINUS PRESSURE: 0
DIARRHEA: 0
RHINORRHEA: 1
COUGH: 1
CHEST TIGHTNESS: 0
VOMITING: 0

## 2018-08-03 NOTE — ED PROVIDER NOTES
use.     Family History   Problem Relation Age of Onset    Asthma Mother     Stroke Mother     High Blood Pressure Mother     Diabetes Mother     Depression Mother     Diabetes Maternal Aunt     High Blood Pressure Maternal Aunt         [id number]        Allergies:  Magnesium-containing compounds and Keflex [cephalexin]    Home Medications:  Prior to Admission medications    Medication Sig Start Date End Date Taking? Authorizing Provider   acetaminophen (TYLENOL) 325 MG tablet Take 2 tablets by mouth every 6 hours as needed for Pain 8/2/18  Yes Violetta Gamboat, DO   diphenhydrAMINE (BENADRYL) 25 MG capsule Take 1 capsule by mouth every 6 hours as needed for Itching 8/2/18 8/12/18 Yes Violetta Herrera Wurst, DO   saline nasal gel (AYR) GEL by Nasal route as needed for Congestion 8/2/18  Yes Violetta Asher, DO   Pyridoxine HCl (B-6) 50 MG TABS Take 1 tablet by mouth 2 times daily 4/9/18  Yes ROSARIO Durham CNM   aspirin EC 81 MG EC tablet Take 1 tablet by mouth daily 4/9/18  Yes ROSARIO Durham CNM       REVIEW OF SYSTEMS    (2-9 systems for level 4, 10 or more for level 5)      Review of Systems   Constitutional: Positive for chills. Negative for fever. HENT: Positive for congestion, postnasal drip, rhinorrhea and sore throat. Negative for sinus pressure, trouble swallowing and voice change. Respiratory: Positive for cough. Negative for chest tightness and shortness of breath. Cardiovascular: Negative for chest pain and leg swelling. Gastrointestinal: Negative for abdominal pain, diarrhea, nausea and vomiting. Genitourinary: Negative for dysuria and hematuria. Musculoskeletal: Negative for arthralgias and back pain. Skin: Negative for rash and wound. Allergic/Immunologic: Negative for food allergies and immunocompromised state. Neurological: Negative for dizziness, syncope, weakness and headaches. Psychiatric/Behavioral: Negative for suicidal ideas.  The patient is not nervous/anxious. PHYSICAL EXAM   (up to 7 for level 4, 8 or more for level 5)      INITIAL VITALS:   /67   Pulse 96   Temp 99 °F (37.2 °C) (Oral)   Resp 24   Ht 5' 5\" (1.651 m)   Wt 152 lb (68.9 kg)   LMP 01/05/2018 (Exact Date)   SpO2 100%   Breastfeeding? No   BMI 25.29 kg/m²     Physical Exam   Constitutional: She is oriented to person, place, and time. She appears well-developed and well-nourished. No distress. HENT:   Head: Normocephalic and atraumatic. Right Ear: External ear normal.   Left Ear: External ear normal.   Nose: Rhinorrhea present. Right sinus exhibits no maxillary sinus tenderness and no frontal sinus tenderness. Left sinus exhibits no maxillary sinus tenderness and no frontal sinus tenderness. Mouth/Throat: Uvula is midline. No trismus in the jaw. Posterior oropharyngeal erythema present. No oropharyngeal exudate, posterior oropharyngeal edema or tonsillar abscesses. Eyes: Conjunctivae and EOM are normal. Pupils are equal, round, and reactive to light. Right eye exhibits no discharge. Left eye exhibits no discharge. Neck: Normal range of motion. Neck supple. No JVD present. No tracheal deviation present. Cardiovascular: Normal rate, regular rhythm and normal heart sounds. Exam reveals no gallop and no friction rub. No murmur heard. Pulmonary/Chest: Effort normal and breath sounds normal. No stridor. No respiratory distress. She has no wheezes. She has no rales. She exhibits no tenderness. Abdominal: Soft. Bowel sounds are normal. She exhibits no distension and no mass. There is no tenderness. There is no rebound and no guarding. Musculoskeletal: Normal range of motion. She exhibits no edema, tenderness or deformity. Lymphadenopathy:     She has no cervical adenopathy. Neurological: She is alert and oriented to person, place, and time. Skin: Skin is warm and dry. No rash noted. She is not diaphoretic. No erythema.    Psychiatric: Judgment normal. Nursing note and vitals reviewed. DIFFERENTIAL  DIAGNOSIS     PLAN (LABS / IMAGING / EKG):  No orders of the defined types were placed in this encounter. MEDICATIONS ORDERED:  Orders Placed This Encounter   Medications    acetaminophen (TYLENOL) tablet 650 mg    diphenhydrAMINE (BENADRYL) tablet 25 mg    acetaminophen (TYLENOL) 325 MG tablet     Sig: Take 2 tablets by mouth every 6 hours as needed for Pain     Dispense:  30 tablet     Refill:  0    diphenhydrAMINE (BENADRYL) 25 MG capsule     Sig: Take 1 capsule by mouth every 6 hours as needed for Itching     Dispense:  30 capsule     Refill:  0    saline nasal gel (AYR) GEL     Sig: by Nasal route as needed for Congestion     Dispense:  1 Tube     Refill:  0       DDX: Viral upper respiratory infection, viral rhinitis, viral pharyngitis      Modified centor: 0  DIAGNOSTIC RESULTS / EMERGENCY DEPARTMENT COURSE / MDM     LABS:  No results found for this visit on 08/02/18. IMPRESSION: This is a 77-year-old female who presents with nasal congestion cough and sore throat times one day. No fever no headache chest pain or shortness of breath. Physical exam reveals a 77-year-old female normal stable vital signs patient is afebrile with on toxic appearing. There is rhinorrhea at the nose with erythema of the turbinates, posterior oropharynx is erythematous without purulence or swelling. Lung sounds clear auscultation bilaterally. Plan: Tylenol Benadryl and saline nasal spray. We will touch base with ObGyn and perform ultrasound of fetus. RADIOLOGY:  No results found. EKG  None    All EKG's are interpreted by the Emergency Department Physician who either signs or Co-signs this chart in the absence of a cardiologist.    EMERGENCY DEPARTMENT COURSE:  Patient seen and evaluated by myself and attending. 77-year-old female at 7 months pregnancy presents with nasal congestion and cough consistent with viral upper respiratory infection.   Patient Disp-30 capsule, R-0Print             Pam Gustafson DO  Emergency Medicine Resident    (Please note that portions of this note were completed with a voice recognition program.  Efforts were made to edit the dictations but occasionally words are mis-transcribed.)     Pam Gustafson DO  08/03/18 7500

## 2018-08-03 NOTE — ED NOTES
Patient to OB. She is being taken by tech to [facility] for quick eval.  Her family is waiting at ER entrance, outside per their report.      Christella Simmonds, RN  08/02/18 [id number]

## 2018-08-03 NOTE — H&P
pregnancy with history of congenital heart disease in prior child, antepartum    Cocaine abuse affecting pregnancy in second trimester    H/O gestational diabetes in prior pregnancy, currently pregnant, second trimester    UTI in pregnancy    Hx of preeclampsia, prior pregnancy, currently pregnant, third trimester    30 weeks gestation of pregnancy        Steroids Given In This Pregnancy:  no     REVIEW OF SYSTEMS:   Constitutional: negative fever, negative chills  HEENT: negative visual disturbances, negative headaches  Respiratory: negative dyspnea, positive cough  Cardiovascular: negative chest pain,  negative palpitations  Gastrointestinal: negative abdominal pain, negative RUQ pain, negative N/V, negative diarrhea, negative constipation  Genitourinary: negative dysuria, negative vaginal discharge  Dermatological: negative rash  Hematologic: negative bruising  Immunologic/Lymphatic: negative recent illness, negative recent sick contact  Musculoskeletal: negative back pain, negative myalgias, negative arthralgias  Neurological:  negative dizziness, negative weakness  Behavior/Psych: negative depression, negative anxiety      OBSTETRICAL HISTORY:   Obstetric History       T3      L4     SAB2   TAB1   Ectopic0   Molar0   Multiple0   Live Births4       # Outcome Date GA Lbr Lorenzo/2nd Weight Sex Delivery Anes PTL Lv   8 Current            7 Term 10/14/16 37w3d  5 lb 11.7 oz (2.6 kg) M CS-LTranv   [place]      Name: Isak Zane:  8                Apgar5: 9   6 SAB 10/20/15           5 SAB 2015           4 Term 14 37w0d  6 lb 9 oz (2.977 kg) M CS-LTranv  Y [place]      Complications: PROM (premature rupture of membranes)   3 Term 09 38w0d  5 lb 9 oz (2.523 kg) F CS-LTranv   [place]   2 TAB 06 5w0d    TAB      1  10/20/05 31w0d  2 lb 7 oz (1.106 kg) F CS-LTranv  N [place]      Complications: Toxemia in pregnancy          PAST MEDICAL HISTORY:   has a past medical history of Depression; ESBL (extended spectrum beta-lactamase) producing bacteria infection; Hypertension; and Marijuana smoker ([facility] [address] PAST SURGICAL HISTORY:   has a past surgical history that includes  section; Cholecystectomy; and Cholecystectomy, laparoscopic. ALLERGIES:  is allergic to magnesium-containing compounds and keflex [cephalexin]. MEDICATIONS:  Prior to Admission medications    Medication Sig Start Date End Date Taking? Authorizing Provider   acetaminophen (TYLENOL) 325 MG tablet Take 2 tablets by mouth every 6 hours as needed for Pain 18   Ro Barreto, DO   diphenhydrAMINE (BENADRYL) 25 MG capsule Take 1 capsule by mouth every 6 hours as needed for Itching 18  Clerance Kayla Gamboat, DO   saline nasal gel (AYR) GEL by Nasal route as needed for Congestion 18   Clerance Kaylaherrera Gamboat, DO   Pyridoxine HCl (B-6) 50 MG TABS Take 1 tablet by mouth 2 times daily 18   ROSARIO Donis CNM   aspirin EC 81 MG EC tablet Take 1 tablet by mouth daily 18   ROSARIO Donis - THANIA       FAMILY HISTORY:  family history includes Asthma in her mother; Depression in her mother; Diabetes in her maternal aunt and mother; High Blood Pressure in her maternal aunt and mother; Stroke in her mother. SOCIAL HISTORY:   reports that she has been smoking Cigarettes. She has a 2.50 pack-year smoking history. She has never used smokeless tobacco. She reports that she uses drugs, including Marijuana. She reports that she does not drink alcohol.     VITALS:  Vitals:    18 2228   BP: 111/72   Pulse: 86   Resp: 20   Temp: 97.3 °F (36.3 °C)   TempSrc: Oral         PHYSICAL EXAM:  Fetal Heart Monitor:  Baseline Heart Rate 145, moderate variability, present accelerations 10x10, absent decelerations  [place]: contractions, none    General appearance:  no apparent distress, alert and cooperative  Neurologic:  alert, oriented, normal speech, no focal findings or movement disorder noted  Lungs:  No increased work of breathing, good air exchange, clear to auscultation bilaterally, no crackles or wheezing  Heart:  regular rate and rhythm and no murmur    Abdomen:  soft, gravid, non-tender, no right upper quadrant tenderness, no CVA tenderness, uterus non-tender, no signs of abruption and no signs of chorioamnionitis  Extremities:  no calf tenderness, non edematous, DTRs: normal    Pelvic Exam: not indicated      OMM Structural Exam:  Chief Complaint:  Pregnancy    Anterior/ Posterior Spinal Curves: Lumbar Lordosis -  Increased  Scoliosis (Lateral Spinal Curves): None  Assessment Tool:  T= Tenderness, A= Asymmetry, R= Restricted Motion (A=Active, P=Passive), T=Tissue Texture Changes  Region Evaluated : Severity / Specific of Major Somatic Dysfunction  M99.03 Lumbar -  Minor TART - more than BG levels -   Major Correlations with:  Genitourinary  Structural Diagnosis: Increased lumbar lordosis  Treatment Plan: Outpatient         PRENATAL LAB RESULTS:   Blood Type/Rh: O pos  Antibody Screen: negative  Hemoglobin, Hematocrit, Platelets: 85.8 / 45.2 / 240  Rubella: immune  T.  Pallidum, IgG: non-reactive   Hepatitis B Surface Antigen: non-reactive   HIV: non-reactive   Sickle Cell Screen: negative  Gonorrhea: positive 3/14/18 (neg ZEE 5/24/18)  Chlamydia: negative  Urine culture: negative, date: 3/14/18    1 hour Glucose Tolerance Test: 98  3 hour Glucose Tolerance Test: N/A    Group B Strep: positive - 4/9/18  Cystic Fibrosis Screen: negative  First Trimester Screen: not available  MSAFP/Multiple Markers: not available  Non-Invasive Prenatal Testing: not available  Anatomy US: Anterior placent, 3VC, male, normal anatomy    ASSESSMENT & PLAN:  Katharine Worley is a 32 y.o. female [id number] at 30w3d    - GBS positive/ Rh positive / R immune   - No indication for GBS prophylaxis at this time    Upper Respiratory Infection   - Vital signs stable   - Patient previously evaluated in the ED    - preeclampsia/toxemia (first pregnancy)      Depression 2016     Priority: High     Patient reports having depression and stopping her medication when she found out she was pregnant. Unsure of name of medication she was taking but has been on it for last 2 years  Will continue with counseling through [address]  2016 Fetal echo scheduled due to exposure of fetus to antidepressant and anti-anxiety medication      Maternal tobacco use in second trimester 2015     Priority: High    Hx of  x 4 2015     Priority: High      C/S 31 weeks   C/S 38 weeks   C/S 37 weeks   C/S 37w3d  2018 advise maternal/Pelvic MRI (without contrast) at 30-32 weeks to evaluate placentation for possible accreta/increta/percreta (anterior placenta with history of 4 prior  sections).  ?congenital heart defect in previous child 2016     Priority: Medium     Fetal echo scheduled at 25 weeks        Family history of diabetes mellitus in mother 2016     Priority: Medium    History of chlamydia 2016     Priority: Medium    30 weeks gestation of pregnancy 2018    Hx of preeclampsia, prior pregnancy, currently pregnant, third trimester 2018    Previous  delivery, antepartum condition or complication      History of 4 prior to C/S   Advise maternal/pelvic MRI (without contrast) at 32 weeks to evaluate placentation for possible accreta/increta/percreta.  Current gavin pregnancy with history of congenital heart disease in prior child, antepartum 2018    Cocaine abuse affecting pregnancy in second trimester 2018    H/O gestational diabetes in prior pregnancy, currently pregnant, second trimester 2018    Retained products of conception after delivery without hemorrhage        Patient may be discharged to home.  Patient counseled on  labor precautions, adequate PO hydration, and fetal kick

## 2018-08-03 NOTE — ED PROVIDER NOTES
in no distress  Cardia vascular: Regular rate and rhythm, no murmurs gallops or rubs  Respiratory: Lungs are clear to auscultation bilaterally without rales wheezes or rhonchi  Patient with edematous turbinates, and excoriations noted bilaterally, she does have erythema to the posterior pharynx but no tonsillar hypertrophy or exudates noted, uvula is midline. She is handling her oral secretions, no cervical adenopathy noted. Gravid uterus, non tender to palpation      Impression: URI    Plan: benadryl for decongestant, saline spray.           Kelin Pavon D.O, M.P.H  Attending Emergency Medicine Physician         Kelin Pavon DO  08/02/18 [id number]

## 2018-08-09 ENCOUNTER — TELEPHONE (OUTPATIENT)
Dept: OBGYN CLINIC | Age: 28
End: 2018-08-09

## 2018-08-13 ENCOUNTER — TELEPHONE (OUTPATIENT)
Dept: OBGYN CLINIC | Age: 28
End: 2018-08-13

## 2018-08-20 ENCOUNTER — TELEPHONE (OUTPATIENT)
Dept: OBGYN CLINIC | Age: 28
End: 2018-08-20

## 2018-08-21 ENCOUNTER — TELEPHONE (OUTPATIENT)
Dept: OBGYN CLINIC | Age: 28
End: 2018-08-21

## 2018-08-28 ENCOUNTER — ROUTINE PRENATAL (OUTPATIENT)
Dept: OBGYN CLINIC | Age: 28
End: 2018-08-28
Payer: MEDICAID

## 2018-08-28 VITALS
WEIGHT: 157 LBS | BODY MASS INDEX: 26.13 KG/M2 | HEART RATE: 86 BPM | DIASTOLIC BLOOD PRESSURE: 70 MMHG | SYSTOLIC BLOOD PRESSURE: 116 MMHG

## 2018-08-28 DIAGNOSIS — O34.219 PREVIOUS CESAREAN DELIVERY, ANTEPARTUM CONDITION OR COMPLICATION: ICD-10-CM

## 2018-08-28 DIAGNOSIS — O98.212 MATERNAL GONORRHEA IN SECOND TRIMESTER: ICD-10-CM

## 2018-08-28 DIAGNOSIS — Z3A.34 34 WEEKS GESTATION OF PREGNANCY: Primary | ICD-10-CM

## 2018-08-28 DIAGNOSIS — O23.40 URINARY TRACT INFECTION IN MOTHER DURING PREGNANCY, ANTEPARTUM: ICD-10-CM

## 2018-08-28 DIAGNOSIS — O99.820 GBS (GROUP B STREPTOCOCCUS CARRIER), +RV CULTURE, CURRENTLY PREGNANT: ICD-10-CM

## 2018-08-28 PROCEDURE — G8427 DOCREV CUR MEDS BY ELIG CLIN: HCPCS | Performed by: OBSTETRICS & GYNECOLOGY

## 2018-08-28 PROCEDURE — G8419 CALC BMI OUT NRM PARAM NOF/U: HCPCS | Performed by: OBSTETRICS & GYNECOLOGY

## 2018-08-28 PROCEDURE — 99213 OFFICE O/P EST LOW 20 MIN: CPT | Performed by: OBSTETRICS & GYNECOLOGY

## 2018-08-28 PROCEDURE — 4004F PT TOBACCO SCREEN RCVD TLK: CPT | Performed by: OBSTETRICS & GYNECOLOGY

## 2018-08-28 NOTE — PROGRESS NOTES
Becca Odell is a 32 y.o. female 34w1d    [id number]    OB History    Para Term  AB Living   8 4 3 1 3 4   SAB TAB Ectopic Molar Multiple Live Births   2 1       4      # Outcome Date GA Lbr Lorenzo/2nd Weight Sex Delivery Anes PTL Lv   8 Current            7 Term 10/14/16 37w3d  5 lb 11.7 oz (2.6 kg) M CS-LTranv   [place]   6 SAB 10/20/15           5 SAB 2015           4 Term 14 37w0d  6 lb 9 oz (2.977 kg) M CS-LTranv  Y [place]      Complications: PROM (premature rupture of membranes)   3 Term 09 38w0d  5 lb 9 oz (2.523 kg) F CS-LTranv   [place]   2 TAB 06 5w0d    TAB      1  10/20/05 31w0d  2 lb 7 oz (1.106 kg) F CS-LTranv  N [place]      Complications: Toxemia in pregnancy          Vitals  BP: 116/70  Weight: 157 lb (71.2 kg)  Pulse: 86  Patient Position: Sitting  Albumin: Negative  Glucose: Negative      The patient was seen and evaluated. There was positive fetal movements. No contractions or leakage of fluid. Signs and symptoms of  labor as well as labor were reviewed. The S/S of Pre-Eclampsia were reviewed with the patient in detail. She is to report any of these if they occur. She currently denies any of these. The patient had her 28 week labs completed. 18 Tdap given   Meets criteria for ASA 81 mg po daily  4/10/2018 repeat cultures at 36 weeks GC/Chlamydia, HIV, RPR       T-Dap Vaccine (27-36 weeks): completed    The patient was instructed on fetal kick counts and was given a kick sheet to complete every 8 hours. She was instructed that the baby should move at a minimum of ten times within one hour after a meal. The patient was instructed to lay down on her left side twenty minutes after eating and count movements for up to one hour with a target value of ten movements. She was instructed to notify the office if she did not make that target after two attempts or if after any attempt there was less than four movements.     The patient reports that the 2016     Priority: Medium    Depression 2016     Priority: Medium     Patient reports having depression and stopping her medication when she found out she was pregnant. Unsure of name of medication she was taking but has been on it for last 2 years  Will continue with counseling through [address] [place] [address] [address]  2016 Fetal echo scheduled due to exposure of fetus to antidepressant and anti-anxiety medication      30 weeks gestation of pregnancy 2018    Hx of preeclampsia, prior pregnancy, currently pregnant, third trimester 2018    Previous  delivery, antepartum condition or complication      History of 4 prior to C/S   Advise maternal/pelvic MRI (without contrast) at 32 weeks to evaluate placentation for possible accreta/increta/percreta.  Current gavin pregnancy with history of congenital heart disease in prior child, antepartum 2018    Cocaine abuse affecting pregnancy in second trimester 2018    H/O gestational diabetes in prior pregnancy, currently pregnant, second trimester 2018        Diagnosis Orders   1. 34 weeks gestation of pregnancy     2. Urinary tract infection in mother during pregnancy, antepartum     3. GBS (group B Streptococcus carrier), +RV culture, currently pregnant     4. Maternal gonorrhea in second trimester     5.  delivery, delivered     6. Fetal drug exposure     7. Previous  delivery, antepartum condition or complication               Plan:  The patient will return to the office for her next visit in 2 weeks. See antepartum flow sheet.       Testing Indicated: No  Scheduled with Nursing-Pt notified: No  Per MFM visit no further appointments needed with MFM and no recommendations for  testing 18

## 2018-09-05 DIAGNOSIS — Z98.891 H/O: C-SECTION: Primary | ICD-10-CM

## 2018-09-11 ENCOUNTER — TELEPHONE (OUTPATIENT)
Dept: OBGYN CLINIC | Age: 28
End: 2018-09-11

## 2018-09-14 ENCOUNTER — TELEPHONE (OUTPATIENT)
Dept: OBGYN CLINIC | Age: 28
End: 2018-09-14

## 2018-09-18 ENCOUNTER — ANESTHESIA EVENT (OUTPATIENT)
Dept: LABOR AND DELIVERY | Age: 28
DRG: 540 | End: 2018-09-18
Payer: MEDICAID

## 2018-09-18 ENCOUNTER — HOSPITAL ENCOUNTER (INPATIENT)
Age: 28
LOS: 4 days | Discharge: HOME | DRG: 540 | End: 2018-09-22
Attending: OBSTETRICS & GYNECOLOGY | Admitting: OBSTETRICS & GYNECOLOGY
Payer: MEDICAID

## 2018-09-18 ENCOUNTER — ANESTHESIA (OUTPATIENT)
Dept: LABOR AND DELIVERY | Age: 28
DRG: 540 | End: 2018-09-18
Payer: MEDICAID

## 2018-09-18 ENCOUNTER — HOSPITAL ENCOUNTER (OUTPATIENT)
Age: 28
Setting detail: SPECIMEN
Discharge: HOME | DRG: 540 | End: 2018-09-18
Payer: MEDICAID

## 2018-09-18 ENCOUNTER — ROUTINE PRENATAL (OUTPATIENT)
Dept: OBGYN CLINIC | Age: 28
End: 2018-09-18
Payer: MEDICAID

## 2018-09-18 VITALS — OXYGEN SATURATION: 100 % | DIASTOLIC BLOOD PRESSURE: 65 MMHG | SYSTOLIC BLOOD PRESSURE: 125 MMHG

## 2018-09-18 VITALS
SYSTOLIC BLOOD PRESSURE: 102 MMHG | HEART RATE: 92 BPM | BODY MASS INDEX: 26.29 KG/M2 | WEIGHT: 158 LBS | DIASTOLIC BLOOD PRESSURE: 60 MMHG

## 2018-09-18 DIAGNOSIS — Z87.74 HISTORY OF CONGENITAL HEART DEFECT: ICD-10-CM

## 2018-09-18 DIAGNOSIS — O34.219 PREVIOUS CESAREAN DELIVERY, ANTEPARTUM CONDITION OR COMPLICATION: ICD-10-CM

## 2018-09-18 DIAGNOSIS — Z98.891 H/O: C-SECTION: ICD-10-CM

## 2018-09-18 DIAGNOSIS — O09.292 HX OF PREECLAMPSIA, PRIOR PREGNANCY, CURRENTLY PREGNANT, SECOND TRIMESTER: ICD-10-CM

## 2018-09-18 DIAGNOSIS — O09.93 HRP (HIGH RISK PREGNANCY), THIRD TRIMESTER: Primary | ICD-10-CM

## 2018-09-18 DIAGNOSIS — O23.40 URINARY TRACT INFECTION IN MOTHER DURING PREGNANCY, ANTEPARTUM: ICD-10-CM

## 2018-09-18 DIAGNOSIS — F14.10 COCAINE ABUSE AFFECTING PREGNANCY IN SECOND TRIMESTER (HCC): ICD-10-CM

## 2018-09-18 DIAGNOSIS — Z98.891 STATUS POST REPEAT LOW TRANSVERSE CESAREAN SECTION: ICD-10-CM

## 2018-09-18 DIAGNOSIS — O98.212 MATERNAL GONORRHEA IN SECOND TRIMESTER: ICD-10-CM

## 2018-09-18 DIAGNOSIS — O99.820 GBS (GROUP B STREPTOCOCCUS CARRIER), +RV CULTURE, CURRENTLY PREGNANT: ICD-10-CM

## 2018-09-18 DIAGNOSIS — F12.90 MARIJUANA SMOKER: ICD-10-CM

## 2018-09-18 DIAGNOSIS — O99.322 COCAINE ABUSE AFFECTING PREGNANCY IN SECOND TRIMESTER (HCC): ICD-10-CM

## 2018-09-18 DIAGNOSIS — O09.299 CURRENT SINGLETON PREGNANCY WITH HISTORY OF CONGENITAL HEART DISEASE IN PRIOR CHILD, ANTEPARTUM: ICD-10-CM

## 2018-09-18 DIAGNOSIS — Z3A.37 37 WEEKS GESTATION OF PREGNANCY: ICD-10-CM

## 2018-09-18 PROBLEM — O09.90 HRP (HIGH RISK PREGNANCY): Status: ACTIVE | Noted: 2018-09-18

## 2018-09-18 PROBLEM — Z3A.30 30 WEEKS GESTATION OF PREGNANCY: Status: RESOLVED | Noted: 2018-08-02 | Resolved: 2018-09-18

## 2018-09-18 PROBLEM — O09.90 HRP (HIGH RISK PREGNANCY), UNSPECIFIED TRIMESTER: Status: ACTIVE | Noted: 2018-09-18

## 2018-09-18 PROBLEM — O09.90 HRP (HIGH RISK PREGNANCY): Status: RESOLVED | Noted: 2018-09-18 | Resolved: 2018-09-18

## 2018-09-18 LAB
-: ABNORMAL
ABO/RH: NORMAL
AMORPHOUS: ABNORMAL
AMPHETAMINE SCREEN URINE: NEGATIVE
ANTIBODY SCREEN: NEGATIVE
ARM BAND NUMBER: NORMAL
BACTERIA: ABNORMAL
BARBITURATE SCREEN URINE: NEGATIVE
BENZODIAZEPINE SCREEN, URINE: NEGATIVE
BILIRUBIN URINE: NEGATIVE
BUPRENORPHINE URINE: ABNORMAL
CANNABINOID SCREEN URINE: POSITIVE
CASTS UA: ABNORMAL /LPF
COCAINE METABOLITE, URINE: NEGATIVE
COLOR: YELLOW
COMMENT UA: ABNORMAL
CRYSTALS, UA: ABNORMAL /HPF
DIRECT EXAM: ABNORMAL
EPITHELIAL CELLS UA: ABNORMAL /HPF
EXPIRATION DATE: NORMAL
GLUCOSE URINE: NEGATIVE
HCT VFR BLD CALC: 34.1 % (ref 36–46)
HEMOGLOBIN: 11.4 G/DL (ref 12–16)
KETONES, URINE: ABNORMAL
LEUKOCYTE ESTERASE, URINE: ABNORMAL
Lab: ABNORMAL
MCH RBC QN AUTO: 31.8 PG (ref 26–34)
MCHC RBC AUTO-ENTMCNC: 33.5 G/DL (ref 31–37)
MCV RBC AUTO: 95.1 FL (ref 80–100)
MDMA URINE: ABNORMAL
METHADONE SCREEN, URINE: NEGATIVE
METHAMPHETAMINE, URINE: ABNORMAL
MUCUS: ABNORMAL
NITRITE, URINE: NEGATIVE
NRBC AUTOMATED: ABNORMAL PER 100 WBC
OPIATES, URINE: NEGATIVE
OTHER OBSERVATIONS UA: ABNORMAL
OXYCODONE SCREEN URINE: NEGATIVE
PDW BLD-RTO: 13.3 % (ref 11.5–14.9)
PH UA: 6.5 (ref 5–8)
PHENCYCLIDINE, URINE: NEGATIVE
PLATELET # BLD: 162 K/UL (ref 150–450)
PMV BLD AUTO: 9.3 FL (ref 6–12)
PROPOXYPHENE, URINE: ABNORMAL
PROTEIN UA: NEGATIVE
RBC # BLD: 3.58 M/UL (ref 4–5.2)
RBC UA: ABNORMAL /HPF
RENAL EPITHELIAL, UA: ABNORMAL /HPF
SPECIFIC GRAVITY UA: 1.02 (ref 1–1.03)
SPECIMEN DESCRIPTION: ABNORMAL
STATUS: ABNORMAL
TEST INFORMATION: ABNORMAL
TRICHOMONAS: ABNORMAL
TRICYCLIC ANTIDEPRESSANTS, UR: ABNORMAL
TURBIDITY: ABNORMAL
URINE HGB: NEGATIVE
UROBILINOGEN, URINE: NORMAL
WBC # BLD: 6 K/UL (ref 3.5–11)
WBC UA: ABNORMAL /HPF
YEAST: ABNORMAL

## 2018-09-18 PROCEDURE — 3700000000 HC ANESTHESIA ATTENDED CARE: Performed by: OBSTETRICS & GYNECOLOGY

## 2018-09-18 PROCEDURE — 6360000002 HC RX W HCPCS: Performed by: STUDENT IN AN ORGANIZED HEALTH CARE EDUCATION/TRAINING PROGRAM

## 2018-09-18 PROCEDURE — 1220000000 HC SEMI PRIVATE OB R&B

## 2018-09-18 PROCEDURE — 6360000002 HC RX W HCPCS: Performed by: ANESTHESIOLOGY

## 2018-09-18 PROCEDURE — 2709999900 HC NON-CHARGEABLE SUPPLY: Performed by: OBSTETRICS & GYNECOLOGY

## 2018-09-18 PROCEDURE — 87491 CHLMYD TRACH DNA AMP PROBE: CPT

## 2018-09-18 PROCEDURE — 6370000000 HC RX 637 (ALT 250 FOR IP): Performed by: STUDENT IN AN ORGANIZED HEALTH CARE EDUCATION/TRAINING PROGRAM

## 2018-09-18 PROCEDURE — 87081 CULTURE SCREEN ONLY: CPT

## 2018-09-18 PROCEDURE — 36415 COLL VENOUS BLD VENIPUNCTURE: CPT

## 2018-09-18 PROCEDURE — 2580000003 HC RX 258: Performed by: STUDENT IN AN ORGANIZED HEALTH CARE EDUCATION/TRAINING PROGRAM

## 2018-09-18 PROCEDURE — 59514 CESAREAN DELIVERY ONLY: CPT | Performed by: OBSTETRICS & GYNECOLOGY

## 2018-09-18 PROCEDURE — 2500000003 HC RX 250 WO HCPCS: Performed by: STUDENT IN AN ORGANIZED HEALTH CARE EDUCATION/TRAINING PROGRAM

## 2018-09-18 PROCEDURE — 87591 N.GONORRHOEAE DNA AMP PROB: CPT

## 2018-09-18 PROCEDURE — 3700000001 HC ADD 15 MINUTES (ANESTHESIA): Performed by: OBSTETRICS & GYNECOLOGY

## 2018-09-18 PROCEDURE — 86901 BLOOD TYPING SEROLOGIC RH(D): CPT

## 2018-09-18 PROCEDURE — 76818 FETAL BIOPHYS PROFILE W/NST: CPT

## 2018-09-18 PROCEDURE — 2500000003 HC RX 250 WO HCPCS: Performed by: ANESTHESIOLOGY

## 2018-09-18 PROCEDURE — 86850 RBC ANTIBODY SCREEN: CPT

## 2018-09-18 PROCEDURE — 88307 TISSUE EXAM BY PATHOLOGIST: CPT

## 2018-09-18 PROCEDURE — 87510 GARDNER VAG DNA DIR PROBE: CPT

## 2018-09-18 PROCEDURE — 87480 CANDIDA DNA DIR PROBE: CPT

## 2018-09-18 PROCEDURE — 94664 DEMO&/EVAL PT USE INHALER: CPT

## 2018-09-18 PROCEDURE — G8427 DOCREV CUR MEDS BY ELIG CLIN: HCPCS | Performed by: OBSTETRICS & GYNECOLOGY

## 2018-09-18 PROCEDURE — 7100000000 HC PACU RECOVERY - FIRST 15 MIN: Performed by: OBSTETRICS & GYNECOLOGY

## 2018-09-18 PROCEDURE — 86900 BLOOD TYPING SEROLOGIC ABO: CPT

## 2018-09-18 PROCEDURE — 3609079900 HC CESAREAN SECTION: Performed by: OBSTETRICS & GYNECOLOGY

## 2018-09-18 PROCEDURE — 2580000003 HC RX 258: Performed by: ANESTHESIOLOGY

## 2018-09-18 PROCEDURE — 87660 TRICHOMONAS VAGIN DIR PROBE: CPT

## 2018-09-18 PROCEDURE — 4004F PT TOBACCO SCREEN RCVD TLK: CPT | Performed by: OBSTETRICS & GYNECOLOGY

## 2018-09-18 PROCEDURE — 7100000001 HC PACU RECOVERY - ADDTL 15 MIN: Performed by: OBSTETRICS & GYNECOLOGY

## 2018-09-18 PROCEDURE — 99213 OFFICE O/P EST LOW 20 MIN: CPT

## 2018-09-18 PROCEDURE — 86780 TREPONEMA PALLIDUM: CPT

## 2018-09-18 PROCEDURE — 85027 COMPLETE CBC AUTOMATED: CPT

## 2018-09-18 PROCEDURE — 81001 URINALYSIS AUTO W/SCOPE: CPT

## 2018-09-18 PROCEDURE — 80307 DRUG TEST PRSMV CHEM ANLYZR: CPT

## 2018-09-18 PROCEDURE — 99214 OFFICE O/P EST MOD 30 MIN: CPT | Performed by: OBSTETRICS & GYNECOLOGY

## 2018-09-18 PROCEDURE — G8419 CALC BMI OUT NRM PARAM NOF/U: HCPCS | Performed by: OBSTETRICS & GYNECOLOGY

## 2018-09-18 RX ORDER — TRISODIUM CITRATE DIHYDRATE AND CITRIC ACID MONOHYDRATE 500; 334 MG/5ML; MG/5ML
30 SOLUTION ORAL ONCE
Status: COMPLETED | OUTPATIENT
Start: 2018-09-18 | End: 2018-09-18

## 2018-09-18 RX ORDER — ACETAMINOPHEN 500 MG
1000 TABLET ORAL EVERY 6 HOURS PRN
Status: DISCONTINUED | OUTPATIENT
Start: 2018-09-18 | End: 2018-09-18

## 2018-09-18 RX ORDER — ONDANSETRON 2 MG/ML
4 INJECTION INTRAMUSCULAR; INTRAVENOUS EVERY 6 HOURS PRN
Status: DISCONTINUED | OUTPATIENT
Start: 2018-09-18 | End: 2018-09-20

## 2018-09-18 RX ORDER — ONDANSETRON 2 MG/ML
INJECTION INTRAMUSCULAR; INTRAVENOUS PRN
Status: DISCONTINUED | OUTPATIENT
Start: 2018-09-18 | End: 2018-09-18 | Stop reason: SDUPTHER

## 2018-09-18 RX ORDER — SODIUM CHLORIDE 0.9 % (FLUSH) 0.9 %
10 SYRINGE (ML) INJECTION PRN
Status: DISCONTINUED | OUTPATIENT
Start: 2018-09-18 | End: 2018-09-20

## 2018-09-18 RX ORDER — OXYCODONE HYDROCHLORIDE AND ACETAMINOPHEN 5; 325 MG/1; MG/1
1 TABLET ORAL EVERY 4 HOURS PRN
Status: DISCONTINUED | OUTPATIENT
Start: 2018-09-19 | End: 2018-09-22 | Stop reason: HOSPADM

## 2018-09-18 RX ORDER — EPHEDRINE SULFATE 50 MG/ML
INJECTION, SOLUTION INTRAVENOUS PRN
Status: DISCONTINUED | OUTPATIENT
Start: 2018-09-18 | End: 2018-09-18 | Stop reason: SDUPTHER

## 2018-09-18 RX ORDER — SODIUM CHLORIDE, SODIUM LACTATE, POTASSIUM CHLORIDE, CALCIUM CHLORIDE 600; 310; 30; 20 MG/100ML; MG/100ML; MG/100ML; MG/100ML
INJECTION, SOLUTION INTRAVENOUS CONTINUOUS
Status: DISCONTINUED | OUTPATIENT
Start: 2018-09-18 | End: 2018-09-19

## 2018-09-18 RX ORDER — OXYCODONE HYDROCHLORIDE AND ACETAMINOPHEN 5; 325 MG/1; MG/1
2 TABLET ORAL EVERY 4 HOURS PRN
Status: DISCONTINUED | OUTPATIENT
Start: 2018-09-19 | End: 2018-09-22 | Stop reason: HOSPADM

## 2018-09-18 RX ORDER — SODIUM CHLORIDE, SODIUM LACTATE, POTASSIUM CHLORIDE, CALCIUM CHLORIDE 600; 310; 30; 20 MG/100ML; MG/100ML; MG/100ML; MG/100ML
INJECTION, SOLUTION INTRAVENOUS CONTINUOUS
Status: DISCONTINUED | OUTPATIENT
Start: 2018-09-18 | End: 2018-09-18

## 2018-09-18 RX ORDER — BUPIVACAINE HYDROCHLORIDE 5 MG/ML
30 INJECTION, SOLUTION EPIDURAL; INTRACAUDAL ONCE
Status: COMPLETED | OUTPATIENT
Start: 2018-09-18 | End: 2018-09-18

## 2018-09-18 RX ORDER — SODIUM CHLORIDE 0.9 % (FLUSH) 0.9 %
10 SYRINGE (ML) INJECTION EVERY 12 HOURS SCHEDULED
Status: DISCONTINUED | OUTPATIENT
Start: 2018-09-18 | End: 2018-09-18

## 2018-09-18 RX ORDER — SODIUM CHLORIDE, SODIUM LACTATE, POTASSIUM CHLORIDE, CALCIUM CHLORIDE 600; 310; 30; 20 MG/100ML; MG/100ML; MG/100ML; MG/100ML
INJECTION, SOLUTION INTRAVENOUS CONTINUOUS PRN
Status: DISCONTINUED | OUTPATIENT
Start: 2018-09-18 | End: 2018-09-18 | Stop reason: SDUPTHER

## 2018-09-18 RX ORDER — DOCUSATE SODIUM 100 MG/1
100 CAPSULE, LIQUID FILLED ORAL 2 TIMES DAILY
Status: DISCONTINUED | OUTPATIENT
Start: 2018-09-18 | End: 2018-09-22 | Stop reason: HOSPADM

## 2018-09-18 RX ORDER — MORPHINE SULFATE 0.5 MG/ML
INJECTION, SOLUTION EPIDURAL; INTRATHECAL; INTRAVENOUS PRN
Status: DISCONTINUED | OUTPATIENT
Start: 2018-09-18 | End: 2018-09-18 | Stop reason: SDUPTHER

## 2018-09-18 RX ORDER — METRONIDAZOLE 500 MG/1
500 TABLET ORAL 2 TIMES DAILY
Qty: 14 TABLET | Refills: 0 | Status: SHIPPED | OUTPATIENT
Start: 2018-09-18 | End: 2018-09-21

## 2018-09-18 RX ORDER — 0.9 % SODIUM CHLORIDE 0.9 %
1000 INTRAVENOUS SOLUTION INTRAVENOUS ONCE
Status: COMPLETED | OUTPATIENT
Start: 2018-09-18 | End: 2018-09-18

## 2018-09-18 RX ORDER — SODIUM CHLORIDE 0.9 % (FLUSH) 0.9 %
10 SYRINGE (ML) INJECTION EVERY 12 HOURS SCHEDULED
Status: DISCONTINUED | OUTPATIENT
Start: 2018-09-18 | End: 2018-09-20

## 2018-09-18 RX ORDER — SIMETHICONE 80 MG
80 TABLET,CHEWABLE ORAL EVERY 6 HOURS PRN
Status: DISCONTINUED | OUTPATIENT
Start: 2018-09-18 | End: 2018-09-22 | Stop reason: HOSPADM

## 2018-09-18 RX ORDER — NAPROXEN 500 MG/1
500 TABLET ORAL EVERY 12 HOURS
Status: DISCONTINUED | OUTPATIENT
Start: 2018-09-19 | End: 2018-09-22 | Stop reason: HOSPADM

## 2018-09-18 RX ORDER — DIPHENHYDRAMINE HYDROCHLORIDE 50 MG/ML
25 INJECTION INTRAMUSCULAR; INTRAVENOUS EVERY 6 HOURS PRN
Status: DISCONTINUED | OUTPATIENT
Start: 2018-09-18 | End: 2018-09-20

## 2018-09-18 RX ORDER — ONDANSETRON 4 MG/1
4 TABLET, FILM COATED ORAL EVERY 8 HOURS PRN
Status: DISCONTINUED | OUTPATIENT
Start: 2018-09-18 | End: 2018-09-18

## 2018-09-18 RX ORDER — METRONIDAZOLE 500 MG/1
500 TABLET ORAL 2 TIMES DAILY WITH MEALS
Status: DISCONTINUED | OUTPATIENT
Start: 2018-09-19 | End: 2018-09-22 | Stop reason: HOSPADM

## 2018-09-18 RX ORDER — LANOLIN 100 %
OINTMENT (GRAM) TOPICAL
Status: DISCONTINUED | OUTPATIENT
Start: 2018-09-18 | End: 2018-09-22 | Stop reason: HOSPADM

## 2018-09-18 RX ORDER — KETOROLAC TROMETHAMINE 30 MG/ML
30 INJECTION, SOLUTION INTRAMUSCULAR; INTRAVENOUS EVERY 6 HOURS
Status: COMPLETED | OUTPATIENT
Start: 2018-09-18 | End: 2018-09-19

## 2018-09-18 RX ORDER — KETOROLAC TROMETHAMINE 30 MG/ML
INJECTION, SOLUTION INTRAMUSCULAR; INTRAVENOUS PRN
Status: DISCONTINUED | OUTPATIENT
Start: 2018-09-18 | End: 2018-09-18 | Stop reason: SDUPTHER

## 2018-09-18 RX ORDER — CEPHALEXIN 500 MG/1
500 CAPSULE ORAL 4 TIMES DAILY
Qty: 28 CAPSULE | Refills: 0 | Status: SHIPPED | OUTPATIENT
Start: 2018-09-18 | End: 2018-09-19 | Stop reason: HOSPADM

## 2018-09-18 RX ORDER — SODIUM CHLORIDE 0.9 % (FLUSH) 0.9 %
10 SYRINGE (ML) INJECTION PRN
Status: DISCONTINUED | OUTPATIENT
Start: 2018-09-18 | End: 2018-09-18

## 2018-09-18 RX ORDER — BUPIVACAINE HYDROCHLORIDE 7.5 MG/ML
INJECTION, SOLUTION INTRASPINAL PRN
Status: DISCONTINUED | OUTPATIENT
Start: 2018-09-18 | End: 2018-09-18 | Stop reason: SDUPTHER

## 2018-09-18 RX ORDER — BUPIVACAINE HYDROCHLORIDE 2.5 MG/ML
30 INJECTION, SOLUTION EPIDURAL; INFILTRATION; INTRACAUDAL ONCE
Status: DISCONTINUED | OUTPATIENT
Start: 2018-09-18 | End: 2018-09-18

## 2018-09-18 RX ADMIN — ACETAMINOPHEN 1000 MG: 500 TABLET, FILM COATED ORAL at 15:56

## 2018-09-18 RX ADMIN — BUPIVACAINE HYDROCHLORIDE IN DEXTROSE 1.6 ML: 7.5 INJECTION, SOLUTION SUBARACHNOID at 21:02

## 2018-09-18 RX ADMIN — MORPHINE SULFATE 0.25 MG: 0.5 INJECTION EPIDURAL; INTRATHECAL; INTRAVENOUS at 21:02

## 2018-09-18 RX ADMIN — EPHEDRINE SULFATE 15 MG: 50 INJECTION INTRAMUSCULAR; INTRAVENOUS; SUBCUTANEOUS at 21:05

## 2018-09-18 RX ADMIN — Medication 50 MILLI-UNITS/MIN: at 22:17

## 2018-09-18 RX ADMIN — Medication 500 ML/HR: at 21:22

## 2018-09-18 RX ADMIN — SODIUM CHLORIDE, POTASSIUM CHLORIDE, SODIUM LACTATE AND CALCIUM CHLORIDE: 600; 310; 30; 20 INJECTION, SOLUTION INTRAVENOUS at 18:30

## 2018-09-18 RX ADMIN — Medication 2 G: at 19:59

## 2018-09-18 RX ADMIN — KETOROLAC TROMETHAMINE 30 MG: 30 INJECTION, SOLUTION INTRAMUSCULAR at 21:31

## 2018-09-18 RX ADMIN — SODIUM CHLORIDE, POTASSIUM CHLORIDE, SODIUM LACTATE AND CALCIUM CHLORIDE: 600; 310; 30; 20 INJECTION, SOLUTION INTRAVENOUS at 19:54

## 2018-09-18 RX ADMIN — SODIUM CHLORIDE 1000 ML: 9 INJECTION, SOLUTION INTRAVENOUS at 16:23

## 2018-09-18 RX ADMIN — Medication 50 MILLI-UNITS/MIN: at 23:53

## 2018-09-18 RX ADMIN — ONDANSETRON HYDROCHLORIDE 4 MG: 4 TABLET, FILM COATED ORAL at 16:59

## 2018-09-18 RX ADMIN — PHENYLEPHRINE HYDROCHLORIDE 100 MCG: 10 INJECTION INTRAVENOUS at 21:03

## 2018-09-18 RX ADMIN — BUPIVACAINE HYDROCHLORIDE 150 MG: 5 INJECTION, SOLUTION EPIDURAL; INTRACAUDAL at 21:43

## 2018-09-18 RX ADMIN — SODIUM CHLORIDE, POTASSIUM CHLORIDE, SODIUM LACTATE AND CALCIUM CHLORIDE: 600; 310; 30; 20 INJECTION, SOLUTION INTRAVENOUS at 20:52

## 2018-09-18 RX ADMIN — SODIUM CITRATE AND CITRIC ACID MONOHYDRATE 30 ML: 500; 334 SOLUTION ORAL at 19:59

## 2018-09-18 RX ADMIN — KETOROLAC TROMETHAMINE 30 MG: 30 INJECTION, SOLUTION INTRAMUSCULAR at 23:19

## 2018-09-18 RX ADMIN — SODIUM CHLORIDE, POTASSIUM CHLORIDE, SODIUM LACTATE AND CALCIUM CHLORIDE: 600; 310; 30; 20 INJECTION, SOLUTION INTRAVENOUS at 22:48

## 2018-09-18 RX ADMIN — ONDANSETRON 4 MG: 2 INJECTION INTRAMUSCULAR; INTRAVENOUS at 21:30

## 2018-09-18 ASSESSMENT — PULMONARY FUNCTION TESTS
PIF_VALUE: 0

## 2018-09-18 ASSESSMENT — PAIN SCALES - GENERAL
PAINLEVEL_OUTOF10: 1
PAINLEVEL_OUTOF10: 3
PAINLEVEL_OUTOF10: 0

## 2018-09-18 NOTE — PROGRESS NOTES
OB/GYN Resident Interval Note     FHT noted to have two prolonged decelerations from a baseline of 130 to dale of 105 lasting 4 minutes with spontaneous return to baseline with moderate variability and accels. 30 minutes later [facility] showed another prolonged deceleration from a baseline of 140 bpm to dale of 125 bpm lasting 3 minutes with spontaneous return to baseline with moderate variability and accels. SVE repeated and found to be closed, thick, and high. Dr. Price Loud updated, decision made to proceed with  delivery secondary to Category II FHT with recurrent prolonged decelerations. Pt counseled on R/B/A of CS including injury to baby, injury to surround abdominal organs (uterus, tubes ovaries, bowel, bladder, ureters, etc.) and need for further surgery (hysterectomy). She verifies understanding. All questions were answered. Will order admission labs. Anesthesia and NICU teams notified.      Vitals:    18 1355 18 1741   BP: 135/78 (!) 111/57   Pulse: 76 65   Resp: 20    Temp: 98.1 °F (36.7 °C) 97.9 °F (36.6 °C)   TempSrc: Oral Oral   SpO2: 98%          Arminsammy Murray DO  Ob/Gyn Resident  Pager: [phone]  2018 6:21 PM

## 2018-09-18 NOTE — DISCHARGE SUMMARY
Obstetric Discharge Summary  [facility]    Patient Name: Ihsan Mcduffie  Patient : 1990  Primary Care Physician: Tim Toribio MD  Admit Date: 2018    Principal Diagnosis: IUP at 37w1d, admitted for repeat  delivery secondary to category 2 FHT with prolonged decelerations      Her pregnancy has been complicated by:   Patient Active Problem List   Diagnosis    Maternal tobacco use in second trimester    Hx of  x 4    Family history of diabetes mellitus in mother    Marijuana smoker    Hx of preeclampsia, prior pregnancy, currently pregnant, second trimester    History of chlamydia    Depression    ?congenital heart defect in previous child    RLTCS 10/14/16 M Ap/9 Wt: 5#12    History of  birth   Thea Surya Fetal drug exposure    Maternal gonorrhea in second trimester    GBS (group B Streptococcus carrier), +RV culture, currently pregnant    Previous  delivery, antepartum condition or complication    Current gavin pregnancy with history of congenital heart disease in prior child, antepartum    Cocaine abuse affecting pregnancy in second trimester    H/O gestational diabetes in prior pregnancy, currently pregnant, second trimester    UTI in pregnancy    Hx of preeclampsia, prior pregnancy, currently pregnant, third trimester    HRP (high risk pregnancy), unspecified trimester    Labor abnormal    Fetal heart rate decelerations affecting management of mother   Suresh Smoke 18 M Apg 8/9 Wt 6#15    Postpartum state       Infection Present?: Yes- BV  Hospital Acquired: No    Surgical Operations & Procedures:  [] Pitocin Induction of Labor  [] Pitocin Augmentation of Labor  [] Prostaglandin Induction of Labor  [] Mechanical Induction of Labor  [] Artificial Rupture of Membranes  [] Intrauterine Pressure Catheter  [] Fetal Scalp Electrode  [] Amnioinfusion  Analgesia: spinal with duramorph  Delivery Type:  Delivery: See Labor and Delivery Summary   Laceration(s): Absent    Consultations: NICU and Anesthesia    Pertinent Findings & Procedures:   Zach Davey is a 32 y.o. female [id number] at 37w1d admitted for category 2 FHT with prolonged decerlations; received Ancef and Bicitra preoperatively. She delivered by  with labor a Live Born infant on 18. Information for the patient's :  Constantino Mandaeism [[id number]]   male  Birth Weight: 6 lb 15.8 oz (3.17 kg)        Apgars: 8 at 1 minute and 9 at 5 minutes. Postpartum course:   Patient had elevated BPs on POD #1, Pre E labs were WNL x 1; Repeat BP WNL    Course of patient: Social work involved. Contacted [facility] and baby is not to be discharged home with mother    Discharge to: Home    Readmission planned: no     Recommendations on Discharge:     Medications:      Medication List      START taking these medications    docusate 100 MG Caps  Commonly known as:  COLACE, DULCOLAX  Take 100 mg by mouth 2 times daily     metroNIDAZOLE 500 MG tablet  Commonly known as:  FLAGYL  Take 1 tablet by mouth 2 times daily for 4 days     naproxen 500 MG tablet  Commonly known as:  NAPROSYN  Take 1 tablet by mouth every 12 hours     oxyCODONE-acetaminophen 5-325 MG per tablet  Commonly known as:  PERCOCET  Take 1 tablet by mouth every 4 hours as needed for Pain for up to 7 days. .        CONTINUE taking these medications    acetaminophen 325 MG tablet  Commonly known as:  TYLENOL  Take 2 tablets by mouth every 6 hours as needed for Pain     aspirin EC 81 MG EC tablet  Take 1 tablet by mouth daily     B-6 50 MG Tabs  Take 1 tablet by mouth 2 times daily     PRENATAL 1 PO     saline nasal gel Gel  by Nasal route as needed for Congestion           Where to Get Your Medications      You can get these medications from any pharmacy    Bring a paper prescription for each of these medications  · docusate 100 MG Caps  · metroNIDAZOLE 500 MG tablet  · naproxen 500 MG tablet  · oxyCODONE-acetaminophen 5-325 MG per tablet           Activity: pelvic rest x 6 weeks, no driving while taking narcotics, no lifting greater than 15 lbs  Diet: regular diet  Follow up: 2 weeks     Condition on discharge: stable    Discharge date: 9/22/18    Annette Dietz DO  Ob/Gyn Resident    Comments:  Home care and follow-up care were reviewed. Pelvic rest, and birth control were reviewed. Signs and symptoms of mastitis and post partum depression were reviewed. The patient is to notify her physician if any of these occur. The patient was counseled on secondary smoke risks and the increased risk of sudden infant death syndrome and respiratory problems to her baby with exposure. She was counseled on various alternate recommendations to decrease the exposure to secondary smoke to her children.

## 2018-09-18 NOTE — FLOWSHEET NOTE
Dr. Gold Erickson and Dr. Kayleigh Oliver in department, requested to review decels on  EFM at [phone] and 1700. Patient positioned on left side and IV fluid bolus continued.

## 2018-09-18 NOTE — H&P
OBSTETRICAL HISTORY [address]    Date: 2018       Time: 2:45 PM   Patient Name: Colin Schaefer     Patient : 1990  Room/Bed: [facility]/6362-    Admission Date/Time: 2018  1:41 PM      CC: Contractions    HPI: Colin Schaefer is a 32 y.o. [id number] at 37w1d who presents with contractions beginning three days ago. She reports she has been feeling contractions every 5 minutes for the past three days, and they have not increased in intensity or frequency. She is also complaining of nausea, vomiting (3 episodes today) and diarrhea (2 episodes today) for the past two days. She denies any sick contacts, and believes she might have eaten something that upset her stomach because she has been eating a lot of \"junk food\" (pork chops, tacos, eggs). She is also complaining of dysuria and frequency, denies hematuria or urgency. Denies vaginal discharge. Reports she only drank one bottle of water today. Denies recent intercourse. Denies s/s of pre-eclampsia including headaches, vision changes, chest pain, SOB, RUQ pain, or increased peripheral edema. The patient reports fetal movement is present, complains of contractions, denies loss of fluid, denies vaginal bleeding. Pregnancy is complicated by H/O  x 4, H/O pre-eclampsia (G1), H/O indicated  delivery (G1 @ 31wks, secondary to pre-eclampsia), H/O gonorrhea in pregnancy (TOR negative), FamHx Type II DM (patient's mother), H/O GDMA1 (G2), depression, H/O cocaine abuse (fetal ECHO wnl, reports last use over four months ago), marijuana abuse, and tobacco abuse. DATING:  LMP: Patient's last menstrual period was 2018 (exact date).   Estimated Date of Delivery: 10/8/18   Based on: early ultrasound, at 10 2/7 weeks GA    PREGNANCY RISK FACTORS:  Patient Active Problem List   Diagnosis    Maternal tobacco use in second trimester    Hx of  x 4    Family history of diabetes mellitus in mother   [employer] Marijuana smoker    Hx of preeclampsia, prior pregnancy, currently pregnant, second trimester    History of chlamydia    Depression    ?congenital heart defect in previous child    RLTCS 10/14/16 M Ap/9 Wt: 5#12    History of  birth   [facility] Fetal drug exposure    Maternal gonorrhea in second trimester    GBS (group B Streptococcus carrier), +RV culture, currently pregnant    Previous  delivery, antepartum condition or complication    Current gavin pregnancy with history of congenital heart disease in prior child, antepartum    Cocaine abuse affecting pregnancy in second trimester    H/O gestational diabetes in prior pregnancy, currently pregnant, second trimester    UTI in pregnancy    Hx of preeclampsia, prior pregnancy, currently pregnant, third trimester    HRP (high risk pregnancy), unspecified trimester        Steroids Given In This Pregnancy:  no     REVIEW OF SYSTEMS:   Constitutional: negative fever, negative chills  HEENT: negative visual disturbances, negative headaches  Respiratory: negative dyspnea, negative cough  Cardiovascular: negative chest pain,  negative palpitations  Gastrointestinal: positive abdominal pain secondary to contractions, negative RUQ pain, positive N/V, positive diarrhea, negative constipation  Genitourinary: positive dysuria, negative vaginal discharge  Dermatological: negative rash  Hematologic: negative bruising  Immunologic/Lymphatic: negative recent illness, negative recent sick contact  Musculoskeletal: negative back pain, negative myalgias, negative arthralgias  Neurological:  negative dizziness, negative weakness  Behavior/Psych: negative depression, negative anxiety      OBSTETRICAL HISTORY:   Obstetric History       T3      L4     SAB2   TAB1   Ectopic0   Molar0   Multiple0   Live Births4       # Outcome Date GA Lbr Lorenzo/2nd Weight Sex Delivery Anes PTL Lv   8 Current            7 Term 10/14/16 37w3d  5 lb 11.7 oz she uses drugs, including Marijuana. She reports that she does not drink alcohol.     VITALS:  Vitals:    09/18/18 1355 09/18/18 1741   BP: 135/78 (!) 111/57   Pulse: 76 65   Resp: 20    Temp: 98.1 °F (36.7 °C) 97.9 °F (36.6 °C)   TempSrc: Oral Oral   SpO2: 98%        PHYSICAL EXAM:  Fetal Heart Monitor:  Baseline Heart Rate 135, moderate variability, present accelerations, absent decelerations  [place]: contractions, irregular, every 5-10 minutes    General appearance:  no apparent distress, alert and cooperative  Neurologic:  alert, oriented, normal speech, no focal findings or movement disorder noted  Lungs:  No increased work of breathing, good air exchange, clear to auscultation bilaterally, no crackles or wheezing  Heart:  regular rate and rhythm and no murmur    Abdomen:  soft, gravid, non-tender, no right upper quadrant tenderness, no CVA tenderness, uterus non-tender, no signs of abruption and no signs of chorioamnionitis  Extremities:  no calf tenderness, non edematous, DTRs: normal    Pelvic Exam:   Speculum: normal appearing external genitalia, normal appearing vaginal mucosa, cervical os visually closed, no bleeding or lesions, no pooling, negative Valsalva  Cervix Check: Closed dilated, 0 % effaced, -3 (out of 3) station, mid position, medium consistency, Cephalic      DATA:  Membranes Ruptured: No  Fern: negative  Nitrazine: Negative  Valsalva/Pooling: absent  Vaginal Bleeding: absent    Wet prep: Clue cells Present, Trichomonas Absent   KOH:  Yeast or Hyphae Absent   Whiff Test: na   Nitrazine: negative      OMM Structural Exam:  Chief Complaint:  Pregnancy    Anterior/ Posterior Spinal Curves: Lumbar Lordosis -  Increased  Scoliosis (Lateral Spinal Curves): None  Assessment Tool:  T= Tenderness, A= Asymmetry, R= Restricted Motion (A=Active, P=Passive), T=Tissue Texture Changes  Region Evaluated : Severity / Specific of Major Somatic Dysfunction  M99.03 Lumbar -  Minor TART - more than BG levels -

## 2018-09-18 NOTE — PROGRESS NOTES
Eduardo Galarza is a 32 y.o. female 37w1d    [id number]    OB History    Para Term  AB Living   8 4 3 1 3 4   SAB TAB Ectopic Molar Multiple Live Births   2 1       4      # Outcome Date GA Lbr Lorenzo/2nd Weight Sex Delivery Anes PTL Lv   8 Current            7 Term 10/14/16 37w3d  5 lb 11.7 oz (2.6 kg) M CS-LTranv   [place]   6 SAB 10/20/15           5 SAB 2015           4 Term 14 37w0d  6 lb 9 oz (2.977 kg) M CS-LTranv  Y [place]      Complications: PROM (premature rupture of membranes)   3 Term 09 38w0d  5 lb 9 oz (2.523 kg) F CS-LTranv   [place]   2 TAB 06 5w0d    TAB      1  10/20/05 31w0d  2 lb 7 oz (1.106 kg) F CS-LTranv  N [place]      Complications: Toxemia in pregnancy          Vitals  BP: 102/60  Weight: 158 lb (71.7 kg)  Pulse: 92  Patient Position: Sitting  Albumin: Negative  Glucose: Negative      The patient was seen and evaluated. There was positive fetal movements. No contractions or leakage of fluid. Signs and symptoms of labor were reviewed. The S/S of Pre-Eclampsia were reviewed with the patient in detail. She is to report any of these if they occur. She currently denies any of these. Pt is c/o contractions every 5 minutes which have increased in frequency/ intensity with associated pressure. Pt appears very uncomfortable  Pt was sent to L&D for evaluation    The patient was instructed on fetal kick counts and was given a kick sheet to complete every 8 hours. She was instructed that the baby should move at a minimum of ten times within one hour after a meal. The patient was instructed to lay down on her left side twenty minutes after eating and count movements for up to one hour with a target value of ten movements. She was instructed to notify the office if she did not make that target after two attempts or if after any attempt there was less than four movements.     The patient reports that the targets have been made Yes.    18 Tdap given   Meets criteria for ASA 81 mg po daily  4/10/2018 repeat cultures at 36 weeks GC/Chlamydia, HIV, RPR       T-Dap Vaccine Completed (27-36 weeks): Completed     Allergies: Allergies as of 09/18/2018 - Review Complete 09/18/2018   Allergen Reaction Noted    Magnesium-containing compounds  05/06/2014    Keflex [cephalexin] Nausea And Vomiting 08/19/2016         Group Beta Strep collection was completed. Yes  GBS Results:   No visits with results within 3 Week(s) from this visit.    Latest known visit with results is:   Hospital Outpatient Visit on 07/16/2018   Component Date Value Ref Range Status    GLU ADMN 07/16/2018 Glucola   Final    Glucose tolerance screen 50g 07/16/2018 98  70 - 135 mg/dL Final    WBC 07/16/2018 9.2  3.5 - 11.3 k/uL Final    RBC 07/16/2018 3.64* 3.95 - 5.11 m/uL Final    Hemoglobin 07/16/2018 11.3* 11.9 - 15.1 g/dL Final    Hematocrit 07/16/2018 34.0* 36.3 - 47.1 % Final    MCV 07/16/2018 93.4  82.6 - 102.9 fL Final    MCH 07/16/2018 31.0  25.2 - 33.5 pg Final    MCHC 07/16/2018 33.2  28.4 - 34.8 g/dL Final    RDW 07/16/2018 13.4  11.8 - 14.4 % Final    Platelets 88/48/4716 191  138 - 453 k/uL Final    MPV 07/16/2018 10.8  8.1 - 13.5 fL Final    NRBC Automated 07/16/2018 0.0  0.0 per 100 WBC Final    Differential Type 07/16/2018 NOT REPORTED   Final    Seg Neutrophils 07/16/2018 65  36 - 65 % Final    Lymphocytes 07/16/2018 28  24 - 43 % Final    Monocytes 07/16/2018 5  3 - 12 % Final    Eosinophils % 07/16/2018 1  1 - 4 % Final    Basophils 07/16/2018 0  0 - 2 % Final    Immature Granulocytes 07/16/2018 1* 0 % Final    Segs Absolute 07/16/2018 5.97  1.50 - 8.10 k/uL Final    Absolute Lymph # 07/16/2018 2.55  1.10 - 3.70 k/uL Final    Absolute Mono # 07/16/2018 0.41  0.10 - 1.20 k/uL Final    Absolute Eos # 07/16/2018 0.12  0.00 - 0.44 k/uL Final    Basophils # 07/16/2018 <0.03  0.00 - 0.20 k/uL Final    Absolute Immature Granulocyte 07/16/2018 0.10  0.00 - 0.30 k/uL Final    WBC  sections).  ?congenital heart defect in previous child 2016     Priority: Medium     Overview Note:     Fetal echo scheduled at 24 weeks        Family history of diabetes mellitus in mother 2016     Priority: Medium    History of chlamydia 2016     Priority: Medium    Depression 2016     Priority: Medium     Overview Note:     Patient reports having depression and stopping her medication when she found out she was pregnant. Unsure of name of medication she was taking but has been on it for last 2 years  Will continue with counseling through [address] [place] [address] [address]  2016 Fetal echo scheduled due to exposure of fetus to antidepressant and anti-anxiety medication      30 weeks gestation of pregnancy 2018    Hx of preeclampsia, prior pregnancy, currently pregnant, third trimester 2018    Previous  delivery, antepartum condition or complication      Overview Note:     History of 4 prior to C/S   Advise maternal/pelvic MRI (without contrast) at 32 weeks to evaluate placentation for possible accreta/increta/percreta.  Current gavin pregnancy with history of congenital heart disease in prior child, antepartum 2018    Cocaine abuse affecting pregnancy in second trimester 2018    H/O gestational diabetes in prior pregnancy, currently pregnant, second trimester 2018        Diagnosis Orders   1. HRP (high risk pregnancy), third trimester  Strep B Screen, Vaginal / Rectal    C.trachomatis N.gonorrhoeae DNA    HIV Screen    T. pallidum Ab   2. 37 weeks gestation of pregnancy  Strep B Screen, Vaginal / Rectal    C.trachomatis N.gonorrhoeae DNA    HIV Screen    T. pallidum Ab   3. Urinary tract infection in mother during pregnancy, antepartum     4. Marijuana smoker     5. Hx of  x 4     6. Hx of preeclampsia, prior pregnancy, currently pregnant, second trimester     7. RLTCS 10/14/16 M Ap/9 Wt: 5#12     8. Fetal drug exposure     9. Maternal gonorrhea in second trimester     10. GBS (group B Streptococcus carrier), +RV culture, currently pregnant     11. ?congenital heart defect in previous child     15. Current gavin pregnancy with history of congenital heart disease in prior child, antepartum     15. Previous  delivery, antepartum condition or complication     14. Cocaine abuse affecting pregnancy in second trimester     15.  delivery, delivered             Plan:  The patient will return to the office for her next visit in 1 weeks. See antepartum flow sheet.    Pt sent to L&D for evaluation of labor

## 2018-09-18 NOTE — FLOWSHEET NOTE
Patient arrived on labor and delivery unit per wheelchair, accompanied by mother. Patient had been seen in Dr. Julian Castillo office around 11:00 and was instructed to come directly to hospital for evaluation. Patient's mother reports that \"I had some things to take care of before coming to hospital.\"  Mother states they were at a gas station and attendant called ambulance because patient was stooped over and unable to move. However mother drove her to hospital.      Patient's mother states \"I want the baby to go home with me before they are in the system. \" Mother requesting paperwork so that she will be able to take infant home. Message left for ELIZABETH Moore. Patient reports sharp abdominal pain, rates \"9.\"  Denies vaginal bleeding, denies leaking of fluid. EFM applied, monitor test completed.        Dr. Niels King in department and updated on patient's arrival.

## 2018-09-18 NOTE — PLAN OF CARE
Problem: Tissue Perfusion - Uteroplacental, Altered:  Goal: Absence of abnormal fetal heart rate pattern  Absence of abnormal fetal heart rate pattern  Outcome: Ongoing      Problem: Venous Thromboembolism - Risk of:  Goal: Will show no signs or symptoms of venous thromboembolism  Will show no signs or symptoms of venous thromboembolism  Outcome: Ongoing

## 2018-09-19 LAB
ABSOLUTE EOS #: 0 K/UL (ref 0–0.4)
ABSOLUTE IMMATURE GRANULOCYTE: ABNORMAL K/UL (ref 0–0.3)
ABSOLUTE LYMPH #: 1.9 K/UL (ref 1–4.8)
ABSOLUTE MONO #: 0.4 K/UL (ref 0.1–1.3)
ALBUMIN SERPL-MCNC: 2.9 G/DL (ref 3.5–5.2)
ALBUMIN/GLOBULIN RATIO: ABNORMAL (ref 1–2.5)
ALP BLD-CCNC: 164 U/L (ref 35–104)
ALT SERPL-CCNC: 11 U/L (ref 5–33)
ANION GAP SERPL CALCULATED.3IONS-SCNC: 12 MMOL/L (ref 9–17)
AST SERPL-CCNC: 24 U/L
BASOPHILS # BLD: 0 % (ref 0–2)
BASOPHILS ABSOLUTE: 0 K/UL (ref 0–0.2)
BILIRUB SERPL-MCNC: 0.58 MG/DL (ref 0.3–1.2)
BUN BLDV-MCNC: 4 MG/DL (ref 6–20)
BUN/CREAT BLD: ABNORMAL (ref 9–20)
C TRACH DNA GENITAL QL NAA+PROBE: NEGATIVE
CALCIUM SERPL-MCNC: 8.6 MG/DL (ref 8.6–10.4)
CHLORIDE BLD-SCNC: 100 MMOL/L (ref 98–107)
CO2: 20 MMOL/L (ref 20–31)
CREAT SERPL-MCNC: 0.4 MG/DL (ref 0.5–0.9)
DIFFERENTIAL TYPE: ABNORMAL
EOSINOPHILS RELATIVE PERCENT: 1 % (ref 0–4)
GFR AFRICAN AMERICAN: >60 ML/MIN
GFR NON-AFRICAN AMERICAN: >60 ML/MIN
GFR SERPL CREATININE-BSD FRML MDRD: ABNORMAL ML/MIN/{1.73_M2}
GFR SERPL CREATININE-BSD FRML MDRD: ABNORMAL ML/MIN/{1.73_M2}
GLUCOSE BLD-MCNC: 68 MG/DL (ref 70–99)
HCT VFR BLD CALC: 33.4 % (ref 36–46)
HEMOGLOBIN: 11.2 G/DL (ref 12–16)
IMMATURE GRANULOCYTES: ABNORMAL %
LACTATE DEHYDROGENASE: 242 U/L (ref 135–214)
LYMPHOCYTES # BLD: 26 % (ref 24–44)
MCH RBC QN AUTO: 31.7 PG (ref 26–34)
MCHC RBC AUTO-ENTMCNC: 33.5 G/DL (ref 31–37)
MCV RBC AUTO: 94.7 FL (ref 80–100)
MONOCYTES # BLD: 6 % (ref 1–7)
N. GONORRHOEAE DNA: NEGATIVE
NRBC AUTOMATED: ABNORMAL PER 100 WBC
PDW BLD-RTO: 13.4 % (ref 11.5–14.9)
PLATELET # BLD: 148 K/UL (ref 150–450)
PLATELET ESTIMATE: ABNORMAL
PMV BLD AUTO: 9.4 FL (ref 6–12)
POTASSIUM SERPL-SCNC: 3.6 MMOL/L (ref 3.7–5.3)
RBC # BLD: 3.53 M/UL (ref 4–5.2)
RBC # BLD: ABNORMAL 10*6/UL
SEG NEUTROPHILS: 67 % (ref 36–66)
SEGMENTED NEUTROPHILS ABSOLUTE COUNT: 4.9 K/UL (ref 1.3–9.1)
SODIUM BLD-SCNC: 132 MMOL/L (ref 135–144)
T. PALLIDUM, IGG: NONREACTIVE
TOTAL PROTEIN: 5.6 G/DL (ref 6.4–8.3)
URIC ACID: 4.4 MG/DL (ref 2.4–5.7)
WBC # BLD: 7.3 K/UL (ref 3.5–11)
WBC # BLD: ABNORMAL 10*3/UL

## 2018-09-19 PROCEDURE — 1220000000 HC SEMI PRIVATE OB R&B

## 2018-09-19 PROCEDURE — 36415 COLL VENOUS BLD VENIPUNCTURE: CPT

## 2018-09-19 PROCEDURE — 99024 POSTOP FOLLOW-UP VISIT: CPT | Performed by: OBSTETRICS & GYNECOLOGY

## 2018-09-19 PROCEDURE — 84550 ASSAY OF BLOOD/URIC ACID: CPT

## 2018-09-19 PROCEDURE — 83615 LACTATE (LD) (LDH) ENZYME: CPT

## 2018-09-19 PROCEDURE — 85025 COMPLETE CBC W/AUTO DIFF WBC: CPT

## 2018-09-19 PROCEDURE — 6360000002 HC RX W HCPCS: Performed by: STUDENT IN AN ORGANIZED HEALTH CARE EDUCATION/TRAINING PROGRAM

## 2018-09-19 PROCEDURE — 80053 COMPREHEN METABOLIC PANEL: CPT

## 2018-09-19 PROCEDURE — 2580000003 HC RX 258: Performed by: STUDENT IN AN ORGANIZED HEALTH CARE EDUCATION/TRAINING PROGRAM

## 2018-09-19 PROCEDURE — 6370000000 HC RX 637 (ALT 250 FOR IP): Performed by: STUDENT IN AN ORGANIZED HEALTH CARE EDUCATION/TRAINING PROGRAM

## 2018-09-19 RX ORDER — PSEUDOEPHEDRINE HCL 30 MG
100 TABLET ORAL 2 TIMES DAILY
Qty: 60 CAPSULE | Refills: 1 | Status: SHIPPED | OUTPATIENT
Start: 2018-09-19 | End: 2018-10-15

## 2018-09-19 RX ORDER — NAPROXEN 500 MG/1
500 TABLET ORAL EVERY 12 HOURS
Qty: 60 TABLET | Refills: 0 | Status: ON HOLD | OUTPATIENT
Start: 2018-09-20 | End: 2018-10-23 | Stop reason: HOSPADM

## 2018-09-19 RX ORDER — OXYCODONE HYDROCHLORIDE AND ACETAMINOPHEN 5; 325 MG/1; MG/1
1 TABLET ORAL EVERY 4 HOURS PRN
Qty: 28 TABLET | Refills: 0 | Status: SHIPPED | OUTPATIENT
Start: 2018-09-19 | End: 2018-09-26

## 2018-09-19 RX ADMIN — DIPHENHYDRAMINE HYDROCHLORIDE 25 MG: 50 INJECTION, SOLUTION INTRAMUSCULAR; INTRAVENOUS at 07:49

## 2018-09-19 RX ADMIN — OXYCODONE AND ACETAMINOPHEN 2 TABLET: 5; 325 TABLET ORAL at 21:28

## 2018-09-19 RX ADMIN — DIPHENHYDRAMINE HYDROCHLORIDE 25 MG: 50 INJECTION, SOLUTION INTRAMUSCULAR; INTRAVENOUS at 00:52

## 2018-09-19 RX ADMIN — KETOROLAC TROMETHAMINE 30 MG: 30 INJECTION, SOLUTION INTRAMUSCULAR at 11:38

## 2018-09-19 RX ADMIN — METRONIDAZOLE 500 MG: 500 TABLET ORAL at 16:21

## 2018-09-19 RX ADMIN — DOCUSATE SODIUM 100 MG: 100 CAPSULE, LIQUID FILLED ORAL at 21:28

## 2018-09-19 RX ADMIN — NAPROXEN 500 MG: 500 TABLET ORAL at 16:21

## 2018-09-19 RX ADMIN — METRONIDAZOLE 500 MG: 500 TABLET ORAL at 08:47

## 2018-09-19 RX ADMIN — KETOROLAC TROMETHAMINE 30 MG: 30 INJECTION, SOLUTION INTRAMUSCULAR at 04:58

## 2018-09-19 RX ADMIN — DEXTROSE MONOHYDRATE 1 G: 5 INJECTION INTRAVENOUS at 11:41

## 2018-09-19 RX ADMIN — DEXTROSE MONOHYDRATE 1 G: 5 INJECTION INTRAVENOUS at 03:51

## 2018-09-19 RX ADMIN — OXYCODONE HYDROCHLORIDE AND ACETAMINOPHEN 1 TABLET: 5; 325 TABLET ORAL at 17:28

## 2018-09-19 ASSESSMENT — PAIN SCALES - GENERAL
PAINLEVEL_OUTOF10: 0
PAINLEVEL_OUTOF10: 9
PAINLEVEL_OUTOF10: 7
PAINLEVEL_OUTOF10: 1
PAINLEVEL_OUTOF10: 0
PAINLEVEL_OUTOF10: 8

## 2018-09-19 NOTE — PROGRESS NOTES
POST OPERATIVE DAY # 1  S/P  Section-: Low Cervical, Transverse    Patient Name: Remigio Wilson  Patient : 1990  Room/Bed: [facility]602189  Admission Date/Time: 2018  1:41 PM  MRN #: [id number]  [facility] #: [id number]        Date: 2018  Time: 12:57 AM        Remigio Wilson is a 32 y.o. female [id number]    This patient was seen today. She Delivered on 18. Her pregnancy was complicated by:     Patient Active Problem List   Diagnosis    Maternal tobacco use in second trimester    Hx of  x 4    Family history of diabetes mellitus in mother    Marijuana smoker    Hx of preeclampsia, prior pregnancy, currently pregnant, second trimester    History of chlamydia    Depression    ?congenital heart defect in previous child    RLTCS 10/14/16 M Ap Wt: 5#12    History of  birth   [facility] Fetal drug exposure    Maternal gonorrhea in second trimester    GBS (group B Streptococcus carrier), +RV culture, currently pregnant    Previous  delivery, antepartum condition or complication    Current gavin pregnancy with history of congenital heart disease in prior child, antepartum    Cocaine abuse affecting pregnancy in second trimester    H/O gestational diabetes in prior pregnancy, currently pregnant, second trimester    UTI in pregnancy    Hx of preeclampsia, prior pregnancy, currently pregnant, third trimester    HRP (high risk pregnancy), unspecified trimester    Labor abnormal    Fetal heart rate decelerations affecting management of mother   Maryam Romero 16 M Apg 8/9 Wt? Today she is doing well without any chief complaint. Her lochia is light. She denies chest pain, shortness of breath, headache, lightheadedness, blurred vision, peripheral edema, palpitations, dry cough and fatigue. She is  breast feeding and she denies any signs or symptoms of mastitis. These were reviewed with her in detail. Her bowels are not regular.  Her voiding pattern is Final    FEW    Epithelial Cells UA 09/18/2018 10 TO 20  /HPF Final    Renal Epithelial, Urine 09/18/2018 NOT REPORTED  0 /HPF Final    Bacteria, UA 09/18/2018 MODERATE* NONE Final    Mucus, UA 09/18/2018 2+* NONE Final    Trichomonas, UA 09/18/2018 NOT REPORTED  NONE Final    Amorphous, UA 09/18/2018 3+* NONE Final    Other Observations UA 09/18/2018 NOT REPORTED  NREQ Final    Yeast, UA 09/18/2018 NOT REPORTED  NONE Final    Amphetamine Screen, Ur 09/18/2018 NEGATIVE  NEG Final    Comment:       (Positive cutoff 1000 ng/mL)                  Barbiturate Screen, Ur 09/18/2018 NEGATIVE  NEG Final    Comment:       (Positive cutoff 200 ng/mL)                  Benzodiazepine Screen, Urine 09/18/2018 NEGATIVE  NEG Final    Comment:       (Positive cutoff 200 ng/mL)                  Cocaine Metabolite, Urine 09/18/2018 NEGATIVE  NEG Final    Comment:       (Positive cutoff 300 ng/mL)                  Methadone Screen, Urine 09/18/2018 NEGATIVE  NEG Final    Comment:       (Positive cutoff 300 ng/mL)                  Opiates, Urine 09/18/2018 NEGATIVE  NEG Final    Comment:       (Positive cutoff 300 ng/mL)                  Phencyclidine, Urine 09/18/2018 NEGATIVE  NEG Final    Comment:       (Positive cutoff 25 ng/mL)                  Propoxyphene, Urine 09/18/2018 NOT REPORTED  NEG Final    Cannabinoid Scrn, Ur 09/18/2018 POSITIVE* NEG Final    Comment:       (Positive cutoff 50 ng/mL)                  Oxycodone Screen, Ur 09/18/2018 NEGATIVE  NEG Final    Comment:       (Positive cutoff 100 ng/mL)                  Methamphetamine, Urine 09/18/2018 NOT REPORTED  NEG Final    Tricyclic Antidepressants, Urine 09/18/2018 NOT REPORTED  NEG Final    MDMA, Urine 09/18/2018 NOT REPORTED  NEG Final    Buprenorphine Urine 09/18/2018 NOT REPORTED  NEG Final    Test Information 09/18/2018 Assay provides medical screening only.   The absence of expected drug(s) and/or   Final    Comment:  metabolite(s) may currently pregnant 2018     Priority: High     2018 treat in labor per protocol      Maternal gonorrhea in second trimester 04/10/2018     Priority: High     4/10/2018 Treated with Rocephin 250 mg IM  Abstain  TEst of cure in 2-3 weeks after treatment  Partner needs treated  Repeat cultures at 36 weeks, plus HIV, RPR      History of  birth 2018     Priority: High    Fetal drug exposure 2018     Priority: High     MFM consult  Fetal echo advised      RLTCS 10/14/16 M Ap/9 Wt: 5#12 10/14/2016     Priority: High    Marijuana smoker 2016     Priority: High     3/31/2016 Patient admits to smoking marijuana daily  Instructed to quit/risks related to pregnancy discussed  Patient reports she has an appointment for a class with [facility] for Marijuana cessation.  Hx of preeclampsia, prior pregnancy, currently pregnant, second trimester 2016     Priority: High     10/20/2005 Female 2# 7oz- 31 week C/S for preeclampsia/toxemia (first pregnancy)      Maternal tobacco use in second trimester 2015     Priority: High    Hx of  x 4 2015     Priority: High      C/S 31 weeks   C/S 38 weeks   C/S 37 weeks   C/S 37w3d  2018 advise maternal/Pelvic MRI (without contrast) at 30-32 weeks to evaluate placentation for possible accreta/increta/percreta (anterior placenta with history of 4 prior  sections).  ?congenital heart defect in previous child 2016     Priority: Medium     Fetal echo scheduled at 25 weeks        Family history of diabetes mellitus in mother 2016     Priority: Medium    History of chlamydia 2016     Priority: Medium    Depression 2016     Priority: Medium     Patient reports having depression and stopping her medication when she found out she was pregnant.   Unsure of name of medication she was taking but has been on it for last 2 years  Will continue with counseling through [facility] extra absorbent pads. · Keep the incision area clean and dry. · Ask your doctor about when it is safe to shower, bathe, or soak in water. · Avoid heavy lifting for six weeks. Diet    After a  birth, you will start with a clear liquid diet. Examples include: Jell-o, broth, and ginger ale. If you tolerate that, you can slowly go back to your regular diet. Stay away from anything greasy or spicy right after surgery. These types of foods can upset your stomach. Drink lots of fluids to prevent constipation. Physical Activity    · Do not lift anything heavier than your baby. · When shifting positions, use a pillow to support the area where the incisions were made. · Get up slowly. This will help you to avoid feeling dizzy or light headed. · Try to move around each day. Light physical activity will help with your recovery. · Ask your doctor when you will be able to go back to work. · Do not drive unless your doctor has given you permission to do so. Do not drive if you are taking prescription pain medicine. · Ask your doctor when you will be able to resume sexual activity. If you have not done so already, talk to your doctor about birth control options. Medications    Your doctor may recommend pain medicine to ease discomfort. If you are taking medicines, follow these general guidelines:   · Take your medicine as directed. Do not change the amount or the schedule. · Do not stop taking them without talking to your doctor. · Do not share them. · Know what the results and side effects. Report them to your doctor. · Some drugs can be dangerous when mixed. Talk to a doctor or pharmacist if you are taking more than one drug. This includes over-the-counter medicine and herb or dietary supplements. · Plan ahead for refills so you don't run out. Lifestyle Changes    You and your doctor will plan lifestyle changes that will help you recover.  To get encouragement and learn

## 2018-09-19 NOTE — ANESTHESIA POSTPROCEDURE EVALUATION
POST- ANESTHESIA EVALUATION       Pt Name: Isabelle Moran  MRN: [id number]  YOB: 1990  Date of evaluation: 2018  Time:  6:47 AM      /71   Pulse 64   Temp 96.6 °F (35.9 °C) (Infrared)   Resp 16   Ht 5' 6\" (1.676 m)   Wt 158 lb (71.7 kg)   LMP 2018 (Exact Date)   SpO2 100%   Breastfeeding? Unknown   BMI 25.50 kg/m²      Consciousness Level  Awake  Cardiopulmonary Status  Stable  Pain Adequately Treated YES  Nausea / Vomiting  NO  Adequate Hydration  YES  Anesthesia Related Complications NONE      Electronically signed by Joss Garcia MD on 2018 at 6:47 AM       Department of Anesthesiology  Postprocedure Note    Patient: Isabelle Moran  MRN: [id number]  YOB: 1990  Date of evaluation: 2018  Time:  6:47 AM     Procedure Summary     Date:  18 Room / Location:  [facility] L&D OR    Anesthesia Start:   Anesthesia Stop:      Procedure:   SECTION REPEAT (N/A Abdomen) Diagnosis:  (PREVIOUS C SECTION DUE 10/8/18)    Surgeon:  Maureen Saha DO Responsible Provider:  Joss Garcia MD    Anesthesia Type:  spinal ASA Status:  2          Anesthesia Type: spinal    Meghan Phase I: Meghan Score: 9    Meghan Phase II: Meghan Score: 10    Last vitals: Reviewed and per EMR flowsheets.        Anesthesia Post Evaluation

## 2018-09-19 NOTE — OP NOTE
Transverse    2. Abdominal Delivery of a Live Born     male          Surgeon:  Kristin Marquez DO      Assistants:  1. Wero Stoll D.O.   2. Humberto Evans D.O. PGY2      OR Staff:  Surgical Assistant: Reba Mcdonald      Anesthesia:  spinal    Duramorph Utilized: Yes        Estimated blood loss:  500 ML    Fluids:     IV: 1000 ml   Blood Products:     Cell Saver: No    Urine Output[de-identified]  100 ml (clear)    Drains: no   TYPE:          TRS Tissue Retention System Skin Retractor Utilized and placed under sterile conditions: No      Complications:  None      Findings/ Delivery Summary:  Mother's Information    Mother Delivery Information    Surgical or Additional Est. Blood Loss (mL):  0 (View Only):  Edit in Flowsheets   Combined Est. Blood Loss (mL):  0        Chuyita Schuler [[id number]]    Brandon Information     Changing the 's delivery date/time could affect patient care.:     Delivery date/time:   18   Delivery type:  , Low Transverse    Details:   Trial of labor?:  No    categorization:  Repeat    priority:  Non-scheduled   Skin Incision Type:  Low Transverse   Uterine Incision:  Low Transverse         Cord    Vessels:  3 Vessels  Complications:  Nuchal  Nuchal Intervention:  reduced  Number of Loops:  1  Delayed Cord Clamping?:  Yes  Cord Clamped Date/Time:  2018  Cord Blood Disposition:  Lab  Gases Sent?:  Yes  Stem Cell Collection (by provider):   No     Placenta    Date/time:  2018  Removal:  Manual Removal  Appearance:  Intact  Disposition:  Pathology     Delivery Resuscitation    Method:  Bulb Suction, Stimulation     Delivery Information    Surgical or additional est. blood loss (mL):  0 (View Only):  Edit in Flowsheets   Combined est. blood loss (mL):  0              Living  infant(s) and Male    Cephalic  left occiput anterior  Other:       Amniotic Fluid was: Meconium  A Nuchal Cord: was present and reduced x surgery were discussed, including but not limited to: pain, bleeding, need for blood transfusion, infection, injury to internal organs including intestines, bladder, uterus, fallopian tubes, ovaries and rarely injury to the fetus. Postoperative complications including pain, bleeding, need for blood transfusion, infection, re-operation, infection of the incisions were also explained. The patient verbalized understanding and agreed to proceed, giving informed consent. The patient was taken to Operating Room, identified as Lisa Thomas and the procedure verified as  Delivery. A Time Out was held and the above information confirmed. Procedure Details:  After Spinal Anesthetic with Duramorph was instilled in the seated position the patient was returned to the supine position with a wedge placed under her right hip. FHT's were obtained, the patient was prepped and draped in the usual sterile manner. A three minute drape delay was completed. The bladder was draining clear urine. SCIP antibiotics were infused, EPC's were in place and operating. A time out was completed. There was an adequate skin check both high and low. The old cicatrix was removed with the #10 blade #1 . A Pfannenstiel incision was made with a #10 scalpel and carried down to the fascia with bovie cautery. Fascial incision was made and extended transversely. The fascia was  from the underlying rectus tissue superiorly and inferiorly. The peritoneum was identified and entered superiorly. The peritoneal incision was extended superiorly and inferiorly, care not to involve the bowel or the bladder. The Jalyn ADELE retractor was placed inferiorly into the incision. The vesico-uterine reflection was developed and skeletonized off the lower uterine segment with blunt and sharp dissection. The [facility] retractor was reapproximated.   A low transverse uterine incision was made and the uterine cavity was entered with extreme caution with the blunt end of the scalpel. This incision was extended using digital dissection in a cephalad to caudad manner. A live male infant was delivered without complications. The head was delivered through the incision and fundal pressure was used for the remaining body of the . There was a nuchal cord. It was reduced with ease x 1 loop. The  had bulb suction of the mouth and nares. There was a spontaneous cry. The infant was vigorous  and there was delayed cord clamping of 1 minute. Please find the  Apgar scores and weight above in the delivery summary. The umbilical cord was then clamped and cut, the infant was handed off to the awaiting neonatology team staff member attending the delivery. Then cord specimen and cord blood was collected and the placenta was delivered using gentle traction and fundal massage, (Spontaneously), it was intact and appeared normal.  The uterus was cleared of all clots and debris and was firm and contracted. IV Pitocin was infusing. An outflow tract was confirmed. The uterine incision was closed with running locked sutures of 0 Vicryl, starting at each corner with figure of \"8's\" and moving to the midline. Excellent hemostasis was observed. Gutters were cleared of all clots and debris. The pelvis was irrigated with warm, sterile water. Uterine incision was reinspected and hemostatic. The uterus, bilateral tubes and ovaries were normal.   The peritoneum was closed with 2-0 vicryl. The fascia was then reapproximated with running sutures of 0 Vicryl, starting at each corner and moving to the midline. It was checked for any defects and there were none. The subcutaneous tissue was irrigated, any bleeding sites were cauterized. The subcuticular space was infiltrated with 0.5% Plain marcaine to limit breakthrough post operative pain.   The skin was closed in a subcuticular fashion with 4-0 vicryl, then covered with tincture of benzoin and steri-strips,  It was dressed

## 2018-09-19 NOTE — L&D DELIVERY NOTE
Mother's Information    Labor Events     labor?:  No     Mother Delivery Information    Surgical or Additional Est. Blood Loss (mL):  0 (View Only):  Edit in Flowsheets   Combined Est. Blood Loss (mL):  0        Robby Larry [[id number]]    Events of Labor     labor?:  No   steroids?:  None  Cervical ripening date/time:     Rupture date/time:     Rupture type:  Artificial=AROM  Fluid color:  Meconium  Meconium consistency: Thin  Fluid odor:  None     Anesthesia    Method:  Spinal     Assisted Delivery Details    Forceps attempted?:  No  Vacuum extractor attempted?:  No     Document Additional Attempt       Document Additional Attempt             Shoulder Dystocia    Shoulder dystocia present?:  No  Add Second Maneuver  Add Third Maneuver  Add Fourth Maneuver  Add Fifth Maneuver  Add Sixth Maneuver  Add Seventh Maneuver  Add Eighth Maneuver  Add Ninth Maneuver      Presentation    Presentation:  Vertex  Position:  Left  _:  Occiput  _:  Anterior      Information     Changing the 's delivery date/time could affect patient care.:     Delivery date/time:   18   Delivery type:  , Low Transverse    Details:   Trial of labor?:  No    categorization:  Repeat    priority:  Non-scheduled   Skin Incision Type:  Low Transverse   Uterine Incision:  Low Transverse         Delivery Providers    Delivering clinician:  Marianne Olvera DO   Provider Role    Carmelita Falk DO Assistant Surgeon    Annalisa Brito DO Resident      Cord    Vessels:  3 Vessels  Complications:  Nuchal  Nuchal Intervention:  reduced  Number of Loops:  1  Delayed Cord Clamping?:  Yes  Cord Clamped Date/Time:  2018  Cord Blood Disposition:  Lab  Gases Sent?:  Yes  Stem Cell Collection (by provider):   No     Placenta    Date/time:  2018  Removal:  Manual Removal  Appearance:  Intact  Disposition:  Pathology     Delivery

## 2018-09-19 NOTE — PROGRESS NOTES
in labor per protocol      Maternal gonorrhea in second trimester 04/10/2018     Priority: High     Overview Note:     4/10/2018 Treated with Rocephin 250 mg IM  Abstain  TEst of cure in 2-3 weeks after treatment  Partner needs treated  Repeat cultures at 36 weeks, plus HIV, RPR      History of  birth 2018     Priority: High    Fetal drug exposure 2018     Priority: High     Overview Note:     MFM consult  Fetal echo advised      RLTCS 10/14/16 M Ap/9 Wt: 5#12 10/14/2016     Priority: High    Marijuana smoker 2016     Priority: High     Overview Note:     3/31/2016 Patient admits to smoking marijuana daily  Instructed to quit/risks related to pregnancy discussed  Patient reports she has an appointment for a class with [facility] for Marijuana cessation.  Hx of preeclampsia, prior pregnancy, currently pregnant, second trimester 2016     Priority: High     Overview Note:     10/20/2005 Female 2# 7oz- 31 week C/S for preeclampsia/toxemia (first pregnancy)      Maternal tobacco use in second trimester 2015     Priority: High    Hx of  x 4 2015     Priority: High     Overview Note:      C/S 31 weeks   C/S 38 weeks   C/S 37 weeks   C/S 37w3d  2018 advise maternal/Pelvic MRI (without contrast) at 30-32 weeks to evaluate placentation for possible accreta/increta/percreta (anterior placenta with history of 4 prior  sections).  ?congenital heart defect in previous child 2016     Priority: Medium     Overview Note:     Fetal echo scheduled at 24 weeks        Family history of diabetes mellitus in mother 2016     Priority: Medium    History of chlamydia 2016     Priority: Medium    Depression 2016     Priority: Medium     Overview Note:     Patient reports having depression and stopping her medication when she found out she was pregnant.   Unsure of name of medication she was taking but has been on it for last 2 years  Will continue with counseling through [facility]  2016 Fetal echo scheduled due to exposure of fetus to antidepressant and anti-anxiety medication      HRP (high risk pregnancy), unspecified trimester 2018    HRP (high risk pregnancy) 2018    Hx of preeclampsia, prior pregnancy, currently pregnant, third trimester 2018    Previous  delivery, antepartum condition or complication      Overview Note:     History of 4 prior to C/S   Advise maternal/pelvic MRI (without contrast) at 32 weeks to evaluate placentation for possible accreta/increta/percreta.       Current gavin pregnancy with history of congenital heart disease in prior child, antepartum 2018    Cocaine abuse affecting pregnancy in second trimester 2018    H/O gestational diabetes in prior pregnancy, currently pregnant, second trimester 2018         Lab Results:  Admission on 2018   Component Date Value Ref Range Status    Color, UA 2018 YELLOW  YEL Final    Turbidity UA 2018 CLOUDY* CLEAR Final    Glucose, Ur 2018 NEGATIVE  NEG Final    Bilirubin Urine 2018 NEGATIVE  NEG Final    Ketones, Urine 2018 MOD* NEG Final    Specific [place], UA 2018 1.021  1.000 - 1.030 Final    Urine Hgb 2018 NEGATIVE  NEG Final    pH, UA 2018 6.5  5.0 - 8.0 Final    Protein, UA 2018 NEGATIVE  NEG Final    Urobilinogen, Urine 2018 Normal  NORM Final    Nitrite, Urine 2018 NEGATIVE  NEG Final    Leukocyte Esterase, Urine 2018 SMALL* NEG Final    Urinalysis Comments 2018 NOT REPORTED   Final    - 2018        Final    WBC, UA 2018 2 TO 5  /HPF Final    RBC, UA 2018 0 TO 2  /HPF Final    Casts UA 2018 NOT REPORTED  /LPF Final    Crystals UA 2018 CALCIUM OXALATE* NONE /HPF Final    FEW    Epithelial Cells UA 2018 10 TO 20  /HPF Final    Renal Epithelial, Urine 09/18/2018 NOT REPORTED  0 /HPF Final    Bacteria, UA 09/18/2018 MODERATE* NONE Final    Mucus, UA 09/18/2018 2+* NONE Final    Trichomonas, UA 09/18/2018 NOT REPORTED  NONE Final    Amorphous, UA 09/18/2018 3+* NONE Final    Other Observations UA 09/18/2018 NOT REPORTED  NREQ Final    Yeast, UA 09/18/2018 NOT REPORTED  NONE Final    Amphetamine Screen, Ur 09/18/2018 NEGATIVE  NEG Final    Comment:       (Positive cutoff 1000 ng/mL)                  Barbiturate Screen, Ur 09/18/2018 NEGATIVE  NEG Final    Comment:       (Positive cutoff 200 ng/mL)                  Benzodiazepine Screen, Urine 09/18/2018 NEGATIVE  NEG Final    Comment:       (Positive cutoff 200 ng/mL)                  Cocaine Metabolite, Urine 09/18/2018 NEGATIVE  NEG Final    Comment:       (Positive cutoff 300 ng/mL)                  Methadone Screen, Urine 09/18/2018 NEGATIVE  NEG Final    Comment:       (Positive cutoff 300 ng/mL)                  Opiates, Urine 09/18/2018 NEGATIVE  NEG Final    Comment:       (Positive cutoff 300 ng/mL)                  Phencyclidine, Urine 09/18/2018 NEGATIVE  NEG Final    Comment:       (Positive cutoff 25 ng/mL)                  Propoxyphene, Urine 09/18/2018 NOT REPORTED  NEG Final    Cannabinoid Scrn, Ur 09/18/2018 POSITIVE* NEG Final    Comment:       (Positive cutoff 50 ng/mL)                  Oxycodone Screen, Ur 09/18/2018 NEGATIVE  NEG Final    Comment:       (Positive cutoff 100 ng/mL)                  Methamphetamine, Urine 09/18/2018 NOT REPORTED  NEG Final    Tricyclic Antidepressants, Urine 09/18/2018 NOT REPORTED  NEG Final    MDMA, Urine 09/18/2018 NOT REPORTED  NEG Final    Buprenorphine Urine 09/18/2018 NOT REPORTED  NEG Final    Test Information 09/18/2018 Assay provides medical screening only.   The absence of expected drug(s) and/or   Final    Comment:  metabolite(s) may indicate diluted or adulterated urine, limitations of testing   or timing of Streptococcus carrier), +RV culture, currently pregnant 2018     Priority: High     Overview Note:     2018 treat in labor per protocol      Maternal gonorrhea in second trimester 04/10/2018     Priority: High     Overview Note:     4/10/2018 Treated with Rocephin 250 mg IM  Abstain  TEst of cure in 2-3 weeks after treatment  Partner needs treated  Repeat cultures at 36 weeks, plus HIV, RPR      History of  birth 2018     Priority: High    Fetal drug exposure 2018     Priority: High     Overview Note:     MFM consult  Fetal echo advised      RLTCS 10/14/16 M Ap/9 Wt: 5#12 10/14/2016     Priority: High    Marijuana smoker 2016     Priority: High     Overview Note:     3/31/2016 Patient admits to smoking marijuana daily  Instructed to quit/risks related to pregnancy discussed  Patient reports she has an appointment for a class with [facility] for Marijuana cessation.  Hx of preeclampsia, prior pregnancy, currently pregnant, second trimester 2016     Priority: High     Overview Note:     10/20/2005 Female 2# 7oz- 31 week C/S for preeclampsia/toxemia (first pregnancy)      Maternal tobacco use in second trimester 2015     Priority: High    Hx of  x 4 2015     Priority: High     Overview Note:      C/S 31 weeks   C/S 38 weeks   C/S 37 weeks   C/S 37w3d  2018 advise maternal/Pelvic MRI (without contrast) at 30-32 weeks to evaluate placentation for possible accreta/increta/percreta (anterior placenta with history of 4 prior  sections).       ?congenital heart defect in previous child 2016     Priority: Medium     Overview Note:     Fetal echo scheduled at 24 weeks        Family history of diabetes mellitus in mother 2016     Priority: Medium    History of chlamydia 2016     Priority: Medium    Depression 2016     Priority: Medium     Overview Note:     Patient reports having depression and

## 2018-09-19 NOTE — PROGRESS NOTES
BJORN spoke to pt per her request. Informed her I was waiting for a return call from her . BJORN did leave a message for [employer] [phone].

## 2018-09-19 NOTE — ANESTHESIA PRE PROCEDURE
SECTION      x 3    CHOLECYSTECTOMY      CHOLECYSTECTOMY, LAPAROSCOPIC             Social History:    Social History   Substance Use Topics    Smoking status: Current Every Day Smoker     Packs/day: 0.50     Years: 5.00     Types: Cigarettes    Smokeless tobacco: Never Used    Alcohol use No                                Ready to quit: Not Answered  Counseling given: Not Answered      Vital Signs (Current):   Vitals:    18 1355 18 1741 18 1938 18 1939   BP: 135/78 (!) 111/57  126/70   Pulse: 76 65  72   Resp: 20   16   Temp: 98.1 °F (36.7 °C) 97.9 °F (36.6 °C)  98.3 °F (36.8 °C)   TempSrc: Oral Oral  Oral   SpO2: 98%  99%    Weight:    158 lb (71.7 kg)   Height:    5' 6\" (1.676 m)                                              BP Readings from Last 3 Encounters:   18 126/70   18 102/60   18 116/70       NPO Status: Time of last liquid consumption: 1600 (sip water with tylenol)                        Time of last solid consumption: 2100                        Date of last liquid consumption: 18                        Date of last solid food consumption: 18    BMI:   Wt Readings from Last 3 Encounters:   18 158 lb (71.7 kg)   18 158 lb (71.7 kg)   18 157 lb (71.2 kg)     Body mass index is 25.5 kg/m².     CBC:   Lab Results   Component Value Date    WBC 6.0 2018    RBC 3.58 2018    HGB 11.4 2018    HCT 34.1 2018    MCV 95.1 2018    RDW 13.3 2018     2018       CMP:   Lab Results   Component Value Date     2018    K 3.9 2018     2018    CO2 19 2018    BUN 10 2018    CREATININE 0.61 2018    GFRAA >60 2018    LABGLOM >60 2018    GLUCOSE 98 2018    GLUCOSE 82 2018    PROT 8.6 2018    CALCIUM 10.6 2018    BILITOT 0.24 2018    ALKPHOS 101 2018    AST 21 2018    ALT 26 2018       POC

## 2018-09-19 NOTE — PROGRESS NOTES
POST OPERATIVE DAY # 1    Phil President is a 32 y.o. female   This patient was seen and examined today. Her pregnancy was complicated by:   Patient Active Problem List   Diagnosis    Maternal tobacco use in second trimester    Hx of  x 4    Family history of diabetes mellitus in mother    Marijuana smoker    Hx of preeclampsia, prior pregnancy, currently pregnant, second trimester    History of chlamydia    Depression    ?congenital heart defect in previous child    RLTCS 10/14/16 M Ap/9 Wt: 5#12    History of  birth   Macdonald [place] Fetal drug exposure    Maternal gonorrhea in second trimester    GBS (group B Streptococcus carrier), +RV culture, currently pregnant    Previous  delivery, antepartum condition or complication    Current gavin pregnancy with history of congenital heart disease in prior child, antepartum    Cocaine abuse affecting pregnancy in second trimester    H/O gestational diabetes in prior pregnancy, currently pregnant, second trimester    UTI in pregnancy    Hx of preeclampsia, prior pregnancy, currently pregnant, third trimester    HRP (high risk pregnancy), unspecified trimester    Labor abnormal    Fetal heart rate decelerations affecting management of mother   Castro Dow 18 M Apg 8/9 Wt 6#15    Postpartum state       Today she is doing well without any chief complaint. Her lochia is light. She denies chest pain, shortness of breath, headache and blurred vision. She is  breast feeding and she denies any breast tenderness. She is ambulating well. Her voiding pattern is normal. I reviewed signs and symptoms of post partum depression with the patient, she currently denies any of these symptoms. She is tolerating solids. Flatus absent. Bowel movement absent.     Vital Signs:  Vitals:    18 0045 18 0150 18 0350 18 0351   BP: 127/64 134/73 118/71    Pulse: 66 72 64    Resp:  16 16    Temp:  96.6 °F (35.9 °C) 96.6 °F (35.9 °C) TempSrc:   Infrared    SpO2:  99%  100%   Weight:       Height:             Urine Input & Output last 24hrs:     Intake/Output Summary (Last 24 hours) at 09/19/18 0520  Last data filed at 09/19/18 0351   Gross per 24 hour   Intake             2748 ml   Output             1575 ml   Net             1173 ml         Physical Exam:   General:  no apparent distress, alert and cooperative  Neurologic:  alert, oriented, normal speech, no focal findings or movement disorder noted  Lungs:  No increased work of breathing, good air exchange, clear to auscultation bilaterally, no crackles or wheezing  Heart:  regular rate and rhythm    Abdomen: abdomen soft, non-distended, non-tender  Fundus: non-tender, normal size, firm, below umbilicus  Incision: clean, dry and intact  Extremities:  no calf tenderness, non edematous    Labs:  Lab Results   Component Value Date    WBC 6.0 09/18/2018    HGB 11.4 (L) 09/18/2018    HCT 34.1 (L) 09/18/2018    MCV 95.1 09/18/2018     09/18/2018       Assessment/Plan:  1. Alex Finley is a [id number] POD # 1 s/p RLTCS   - Doing well, VSS    - male infant in [address], circumcision desired   - Encourage ambulation and use of incentive spirometer   - D/C alarcon catheter and saline lock IV on POD #1    - H/H awaiting  2. Rh positive/Rubella immune  3. both breast and bottle   4. H/O Preeclampsia    - G1   - Denies s/s of pre E    - Pt has had elevated BPs   - Will order pre E labs   5. Depression    - Stable on no meds    - Denies s/s of postpartum depression   6. Marijuana use    - UDS positive on admission    - SW consult ordered   7. Bacterial vaginosis    - Flagyl 500 mg PO BID x7d   8. Continue post-op care. Counseling Completed:  Secondary Smoke risks and Sudden Infant Death Syndrome were reviewed with recommendations. Infant sleeping, \"back to sleep\" and avoidance of co-sleeping recommendations were reviewed. Signs and Symptoms of Post Partum Depression were reviewed.  The patient is to call if any occur. Signs and symptoms of Mastitis were reviewed. The patient is to call if any occur for follow up.   Discharge instructions including pelvic rest, incision care, 15 lb weight restriction, no driving with pain medicine and office follow-up were reviewed with patient     Providers Name: Dr. Emma Chester DO  Ob/Gyn Resident  9/19/2018, 5:26 AM

## 2018-09-19 NOTE — PROGRESS NOTES
BJORN spoke to pt's mother  Per her request yesterday. She said pt has four other children in \"the system\" and she would like pt's new baby to be placed in her home. BJORN did [facility] this morning and spoke to Duane Graces. BJORN made referral regarding recent loss of custody and current marijuana use. BJORN asked that the  assigned  call this worker with plan. In the meantime, pt should not be discharged with infant.

## 2018-09-19 NOTE — ANESTHESIA PROCEDURE NOTES
Spinal Block    Patient location during procedure: OR  Start time: 9/18/2018 9:00 PM  End time: 9/18/2018 9:07 PM  Reason for block: primary anesthetic  Staffing  Anesthesiologist: Darion Smith  Preanesthetic Checklist  Completed: patient identified, site marked, surgical consent, pre-op evaluation, timeout performed, IV checked, risks and benefits discussed, monitors and equipment checked, anesthesia consent given, oxygen available and patient being monitored  Spinal Block  Patient position: sitting  Prep: Betadine  Patient monitoring: cardiac monitor, continuous pulse ox and frequent blood pressure checks  Approach: midline  Location: L3/L4  Provider prep: mask and sterile gloves  Local infiltration: lidocaine  Dose: 0.5  Agent: bupivacaine  Adjuvant: duramorph  Dose: 1.6  Dose: 1.6  Needle  Needle type:  Héctor   Needle gauge: 25 G  Needle length: 3.5 in  Needle insertion depth: 1.5 cm  Assessment  Sensory level: T4  Swirl obtained: Yes  CSF: clear  Attempts: 1  Hemodynamics: stable

## 2018-09-19 NOTE — PLAN OF CARE
Problem: Venous Thromboembolism - Risk of:  Goal: Will show no signs or symptoms of venous thromboembolism  Will show no signs or symptoms of venous thromboembolism   Outcome: Ongoing      Problem: Pain:  Goal: Pain level will decrease  Pain level will decrease   Outcome: Ongoing    Goal: Control of acute pain  Control of acute pain   Outcome: Ongoing    Goal: Control of chronic pain  Control of chronic pain   Outcome: Ongoing

## 2018-09-20 PROCEDURE — 6370000000 HC RX 637 (ALT 250 FOR IP): Performed by: STUDENT IN AN ORGANIZED HEALTH CARE EDUCATION/TRAINING PROGRAM

## 2018-09-20 PROCEDURE — 1220000000 HC SEMI PRIVATE OB R&B

## 2018-09-20 PROCEDURE — 99024 POSTOP FOLLOW-UP VISIT: CPT | Performed by: OBSTETRICS & GYNECOLOGY

## 2018-09-20 RX ORDER — BISACODYL 10 MG
10 SUPPOSITORY, RECTAL RECTAL DAILY PRN
Status: DISCONTINUED | OUTPATIENT
Start: 2018-09-20 | End: 2018-09-22 | Stop reason: HOSPADM

## 2018-09-20 RX ADMIN — METRONIDAZOLE 500 MG: 500 TABLET ORAL at 16:30

## 2018-09-20 RX ADMIN — NAPROXEN 500 MG: 500 TABLET ORAL at 05:51

## 2018-09-20 RX ADMIN — OXYCODONE AND ACETAMINOPHEN 2 TABLET: 5; 325 TABLET ORAL at 10:32

## 2018-09-20 RX ADMIN — OXYCODONE HYDROCHLORIDE AND ACETAMINOPHEN 1 TABLET: 5; 325 TABLET ORAL at 05:51

## 2018-09-20 RX ADMIN — METRONIDAZOLE 500 MG: 500 TABLET ORAL at 09:29

## 2018-09-20 RX ADMIN — OXYCODONE AND ACETAMINOPHEN 2 TABLET: 5; 325 TABLET ORAL at 01:28

## 2018-09-20 RX ADMIN — OXYCODONE AND ACETAMINOPHEN 2 TABLET: 5; 325 TABLET ORAL at 15:17

## 2018-09-20 RX ADMIN — OXYCODONE AND ACETAMINOPHEN 2 TABLET: 5; 325 TABLET ORAL at 19:48

## 2018-09-20 RX ADMIN — BISACODYL 10 MG: 10 SUPPOSITORY RECTAL at 20:25

## 2018-09-20 RX ADMIN — DOCUSATE SODIUM 100 MG: 100 CAPSULE, LIQUID FILLED ORAL at 09:29

## 2018-09-20 RX ADMIN — NAPROXEN 500 MG: 500 TABLET ORAL at 16:30

## 2018-09-20 RX ADMIN — DOCUSATE SODIUM 100 MG: 100 CAPSULE, LIQUID FILLED ORAL at 19:48

## 2018-09-20 ASSESSMENT — PAIN DESCRIPTION - LOCATION
LOCATION: ABDOMEN

## 2018-09-20 ASSESSMENT — PAIN SCALES - GENERAL
PAINLEVEL_OUTOF10: 8
PAINLEVEL_OUTOF10: 7
PAINLEVEL_OUTOF10: 8
PAINLEVEL_OUTOF10: 8
PAINLEVEL_OUTOF10: 6
PAINLEVEL_OUTOF10: 7
PAINLEVEL_OUTOF10: 7
PAINLEVEL_OUTOF10: 10
PAINLEVEL_OUTOF10: 5

## 2018-09-20 ASSESSMENT — PAIN DESCRIPTION - DESCRIPTORS
DESCRIPTORS: DISCOMFORT
DESCRIPTORS: SHARP;DISCOMFORT
DESCRIPTORS: ACHING

## 2018-09-20 ASSESSMENT — PAIN DESCRIPTION - ORIENTATION
ORIENTATION: LOWER;MID

## 2018-09-20 ASSESSMENT — PAIN DESCRIPTION - PAIN TYPE
TYPE: ACUTE PAIN;SURGICAL PAIN

## 2018-09-20 ASSESSMENT — PAIN DESCRIPTION - PROGRESSION
CLINICAL_PROGRESSION: GRADUALLY WORSENING
CLINICAL_PROGRESSION: NOT CHANGED

## 2018-09-20 ASSESSMENT — PAIN DESCRIPTION - ONSET
ONSET: ON-GOING

## 2018-09-20 ASSESSMENT — PAIN DESCRIPTION - FREQUENCY
FREQUENCY: CONTINUOUS

## 2018-09-20 NOTE — PLAN OF CARE
Problem: Discharge Planning:  Goal: Discharged to appropriate level of care  Discharged to appropriate level of care   Outcome: Ongoing      Problem: Fluid Volume - Imbalance:  Goal: Absence of postpartum hemorrhage signs and symptoms  Absence of postpartum hemorrhage signs and symptoms   Outcome: Met This Shift    Goal: Absence of imbalanced fluid volume signs and symptoms  Absence of imbalanced fluid volume signs and symptoms   Outcome: Met This Shift      Problem: Infection - Surgical Site:  Goal: Will show no infection signs and symptoms  Will show no infection signs and symptoms   Outcome: Met This Shift      Problem: Pain - Acute:  Goal: Pain level will decrease  Pain level will decrease   Outcome: Met This Shift

## 2018-09-20 NOTE — PROGRESS NOTES
BJORN spoke to Serena from [place]; he is still trying to get the staffing at [facility] and will not have final answer until tomorrow. He will call this worker when he has final decision. If they cannot do staffing here, BJORN recommends helping pt get downtown via cab. Pt does not have any way to get there. SW following.

## 2018-09-20 NOTE — PROGRESS NOTES
°C) 97 °F (36.1 °C)   TempSrc:  Oral Infrared Infrared   SpO2: 100%      Weight:       Height:             Urine Input & Output last 24hrs:     Intake/Output Summary (Last 24 hours) at 09/20/18 0363  Last data filed at 09/19/18 1502   Gross per 24 hour   Intake             1796 ml   Output              600 ml   Net             1196 ml         Physical Exam:   General:  no apparent distress, alert and cooperative  Neurologic:  alert, oriented, normal speech, no focal findings or movement disorder noted  Lungs:  No increased work of breathing, good air exchange, clear to auscultation bilaterally, no crackles or wheezing  Heart:  regular rate and rhythm    Abdomen: abdomen soft, non-distended, non-tender  Fundus: non-tender, normal size, firm, below umbilicus  Incision: clean, dry and intact  Extremities:  no calf tenderness, non edematous    Labs:  Lab Results   Component Value Date    WBC 7.3 09/19/2018    HGB 11.2 (L) 09/19/2018    HCT 33.4 (L) 09/19/2018    MCV 94.7 09/19/2018     (L) 09/19/2018       Assessment/Plan:  1. Rudolph Goode is a [id number] POD # 2 s/p RLTCS   - Doing well, VSS    - male infant in [address], circumcision desired   - Encourage ambulation and use of incentive spirometer   - D/C alarcon catheter and saline lock IV on POD #1    - H/H awaiting  2. Rh positive/Rubella immune  3. both breast and bottle   4. H/O Preeclampsia    - G1   - Denies s/s of pre E    - Pt has not had any elevated BPs overnight    - Pre E labs WNL x1  5. Lost custody of other children    - SW consult complete    -  assigned    - Baby will not be discharged home with mom   6. Depression    - Stable on no meds    - Denies s/s of postpartum depression   7. Marijuana use    - UDS positive on admission    - SW consult complete  8. Bacterial vaginosis    - Flagyl 500 mg PO BID x7d   9. Continue post-op care.     Counseling Completed:  Secondary Smoke risks and Sudden Infant Death Syndrome were reviewed

## 2018-09-20 NOTE — PROGRESS NOTES
Obstetrical Rounds:    POD#: 2  Hospital Day: 3  Procedure: repeat  section    Date: 2018  Time: 9:14 AM        Patient Name: Yashria Simmons  Patient : 1990  Room/Bed: 8765/6610-08  Admission Date/Time: 2018  1:41 PM  MRN #: [id number]  [facility] #: [id number]        Attending Physician Statement  I have discussed the care of Yashira Simmons, including pertinent history and exam findings,  with the resident. I have reviewed their note in the electronic medical record. I have seen and examined the patient and the key elements of all parts of the encounter have been performed/reviewed by me . I agree with the assessment, plan and orders as documented by the resident. Pt without c/c. Pt ambulating & urinating without difficulty. + flatus.  Plan discharge     Vitals:    18 0125   BP: 107/64   Pulse: 71   Resp: 16   Temp: 97 °F (36.1 °C)   SpO2:        Admission on 2018   Component Date Value Ref Range Status    Color, UA 2018 YELLOW  YEL Final    Turbidity UA 2018 CLOUDY* CLEAR Final    Glucose, Ur 2018 NEGATIVE  NEG Final    Bilirubin Urine 2018 NEGATIVE  NEG Final    Ketones, Urine 2018 MOD* NEG Final    Specific [place], UA 2018 1.021  1.000 - 1.030 Final    Urine Hgb 2018 NEGATIVE  NEG Final    pH, UA 2018 6.5  5.0 - 8.0 Final    Protein, UA 2018 NEGATIVE  NEG Final    Urobilinogen, Urine 2018 Normal  NORM Final    Nitrite, Urine 2018 NEGATIVE  NEG Final    Leukocyte Esterase, Urine 2018 SMALL* NEG Final    Urinalysis Comments 2018 NOT REPORTED   Final    - 2018        Final    WBC, UA 2018 2 TO 5  /HPF Final    RBC, UA 2018 0 TO 2  /HPF Final    Casts UA 2018 NOT REPORTED  /LPF Final    Crystals UA 2018 CALCIUM OXALATE* NONE /HPF Final    FEW    Epithelial Cells UA 2018 10 TO 20  /HPF Final    Renal Epithelial, Urine 2018 NOT REPORTED  0 /HPF Final    Bacteria, UA 09/18/2018 MODERATE* NONE Final    Mucus, UA 09/18/2018 2+* NONE Final    Trichomonas, UA 09/18/2018 NOT REPORTED  NONE Final    Amorphous, UA 09/18/2018 3+* NONE Final    Other Observations UA 09/18/2018 NOT REPORTED  NREQ Final    Yeast, UA 09/18/2018 NOT REPORTED  NONE Final    Amphetamine Screen, Ur 09/18/2018 NEGATIVE  NEG Final    Comment:       (Positive cutoff 1000 ng/mL)                  Barbiturate Screen, Ur 09/18/2018 NEGATIVE  NEG Final    Comment:       (Positive cutoff 200 ng/mL)                  Benzodiazepine Screen, Urine 09/18/2018 NEGATIVE  NEG Final    Comment:       (Positive cutoff 200 ng/mL)                  Cocaine Metabolite, Urine 09/18/2018 NEGATIVE  NEG Final    Comment:       (Positive cutoff 300 ng/mL)                  Methadone Screen, Urine 09/18/2018 NEGATIVE  NEG Final    Comment:       (Positive cutoff 300 ng/mL)                  Opiates, Urine 09/18/2018 NEGATIVE  NEG Final    Comment:       (Positive cutoff 300 ng/mL)                  Phencyclidine, Urine 09/18/2018 NEGATIVE  NEG Final    Comment:       (Positive cutoff 25 ng/mL)                  Propoxyphene, Urine 09/18/2018 NOT REPORTED  NEG Final    Cannabinoid Scrn, Ur 09/18/2018 POSITIVE* NEG Final    Comment:       (Positive cutoff 50 ng/mL)                  Oxycodone Screen, Ur 09/18/2018 NEGATIVE  NEG Final    Comment:       (Positive cutoff 100 ng/mL)                  Methamphetamine, Urine 09/18/2018 NOT REPORTED  NEG Final    Tricyclic Antidepressants, Urine 09/18/2018 NOT REPORTED  NEG Final    MDMA, Urine 09/18/2018 NOT REPORTED  NEG Final    Buprenorphine Urine 09/18/2018 NOT REPORTED  NEG Final    Test Information 09/18/2018 Assay provides medical screening only. The absence of expected drug(s) and/or   Final    Comment:  metabolite(s) may indicate diluted or adulterated urine, limitations of testing   or timing of collection.   Testing for legal purposes should be species, Gardnerella vaginalis, and Trichomonas vaginalis nucleic acid   Final    Direct Exam 09/18/2018  in vaginal fluid specimens from patients with symptoms of vaginitis/vaginosis. Final    Status 09/18/2018 FINAL 09/18/2018   Final    Expiration Date 09/18/2018 09/21/2018   Final    Arm Band Number 09/18/2018 [id number]   Final    ABO/Rh 09/18/2018 O POSITIVE   Final    Antibody Screen 09/18/2018 NEGATIVE   Final    WBC 09/18/2018 6.0  3.5 - 11.0 k/uL Final    RBC 09/18/2018 3.58* 4.0 - 5.2 m/uL Final    Hemoglobin 09/18/2018 11.4* 12.0 - 16.0 g/dL Final    Hematocrit 09/18/2018 34.1* 36 - 46 % Final    MCV 09/18/2018 95.1  80 - 100 fL Final    MCH 09/18/2018 31.8  26 - 34 pg Final    MCHC 09/18/2018 33.5  31 - 37 g/dL Final    RDW 09/18/2018 13.3  11.5 - 14.9 % Final    Platelets 83/41/4159 162  150 - 450 k/uL Final    MPV 09/18/2018 9.3  6.0 - 12.0 fL Final    NRBC Automated 09/18/2018 NOT REPORTED  per 100 WBC Final    T. pallidum, IgG 09/18/2018 NONREACTIVE  NR Final    Comment:       T. pallidum antibodies are not detected. There is no serological evidence of infection with T. pallidum (early primary   syphilis cannot be excluded). Retest in 2-4 weeks if syphilis is clinically   suspect.             WBC 09/19/2018 7.3  3.5 - 11.0 k/uL Final    RBC 09/19/2018 3.53* 4.0 - 5.2 m/uL Final    Hemoglobin 09/19/2018 11.2* 12.0 - 16.0 g/dL Final    Hematocrit 09/19/2018 33.4* 36 - 46 % Final    MCV 09/19/2018 94.7  80 - 100 fL Final    MCH 09/19/2018 31.7  26 - 34 pg Final    MCHC 09/19/2018 33.5  31 - 37 g/dL Final    RDW 09/19/2018 13.4  11.5 - 14.9 % Final    Platelets 07/06/1925 148* 150 - 450 k/uL Final    MPV 09/19/2018 9.4  6.0 - 12.0 fL Final    NRBC Automated 09/19/2018 NOT REPORTED  per 100 WBC Final    Differential Type 09/19/2018 NOT REPORTED   Final    Seg Neutrophils 09/19/2018 67* 36 - 66 % Final    Lymphocytes 09/19/2018 26  24 - 44 % Final    Monocytes 09/19/2018 6

## 2018-09-21 LAB
CULTURE: NORMAL
Lab: NORMAL
SPECIMEN DESCRIPTION: NORMAL
STATUS: NORMAL
SURGICAL PATHOLOGY REPORT: NORMAL

## 2018-09-21 PROCEDURE — 1220000000 HC SEMI PRIVATE OB R&B

## 2018-09-21 PROCEDURE — 6370000000 HC RX 637 (ALT 250 FOR IP): Performed by: STUDENT IN AN ORGANIZED HEALTH CARE EDUCATION/TRAINING PROGRAM

## 2018-09-21 RX ORDER — METRONIDAZOLE 500 MG/1
500 TABLET ORAL 2 TIMES DAILY
Qty: 8 TABLET | Refills: 0 | Status: SHIPPED | OUTPATIENT
Start: 2018-09-21 | End: 2018-09-25

## 2018-09-21 RX ADMIN — DOCUSATE SODIUM 100 MG: 100 CAPSULE, LIQUID FILLED ORAL at 09:29

## 2018-09-21 RX ADMIN — DOCUSATE SODIUM 100 MG: 100 CAPSULE, LIQUID FILLED ORAL at 19:35

## 2018-09-21 RX ADMIN — METRONIDAZOLE 500 MG: 500 TABLET ORAL at 16:48

## 2018-09-21 RX ADMIN — OXYCODONE AND ACETAMINOPHEN 2 TABLET: 5; 325 TABLET ORAL at 09:29

## 2018-09-21 RX ADMIN — OXYCODONE AND ACETAMINOPHEN 2 TABLET: 5; 325 TABLET ORAL at 19:36

## 2018-09-21 RX ADMIN — OXYCODONE AND ACETAMINOPHEN 2 TABLET: 5; 325 TABLET ORAL at 01:01

## 2018-09-21 RX ADMIN — NAPROXEN 500 MG: 500 TABLET ORAL at 16:13

## 2018-09-21 RX ADMIN — OXYCODONE AND ACETAMINOPHEN 2 TABLET: 5; 325 TABLET ORAL at 23:59

## 2018-09-21 RX ADMIN — METRONIDAZOLE 500 MG: 500 TABLET ORAL at 09:29

## 2018-09-21 RX ADMIN — SIMETHICONE 80 MG: 80 TABLET, CHEWABLE ORAL at 01:01

## 2018-09-21 RX ADMIN — NAPROXEN 500 MG: 500 TABLET ORAL at 04:43

## 2018-09-21 ASSESSMENT — PAIN DESCRIPTION - LOCATION: LOCATION: ABDOMEN

## 2018-09-21 ASSESSMENT — PAIN SCALES - GENERAL
PAINLEVEL_OUTOF10: 6
PAINLEVEL_OUTOF10: 7
PAINLEVEL_OUTOF10: 4
PAINLEVEL_OUTOF10: 3
PAINLEVEL_OUTOF10: 5

## 2018-09-21 ASSESSMENT — PAIN DESCRIPTION - FREQUENCY: FREQUENCY: CONTINUOUS

## 2018-09-21 ASSESSMENT — PAIN DESCRIPTION - ORIENTATION: ORIENTATION: LOWER;MID

## 2018-09-21 ASSESSMENT — PAIN DESCRIPTION - DESCRIPTORS: DESCRIPTORS: ACHING

## 2018-09-21 ASSESSMENT — PAIN DESCRIPTION - PAIN TYPE: TYPE: ACUTE PAIN

## 2018-09-21 ASSESSMENT — PAIN DESCRIPTION - ONSET: ONSET: ON-GOING

## 2018-09-21 NOTE — PROGRESS NOTES
POST OPERATIVE DAY # 3    Rudolph Goode is a 32 y.o. female   This patient was seen and examined today. Her pregnancy was complicated by:   Patient Active Problem List   Diagnosis    Maternal tobacco use in second trimester    Hx of  x 4    Family history of diabetes mellitus in mother    Marijuana smoker    Hx of preeclampsia, prior pregnancy, currently pregnant, second trimester    History of chlamydia    Depression    ?congenital heart defect in previous child    RLTCS 10/14/16 M Ap/9 Wt: 5#12    History of  birth   Jocelyne Rachel Fetal drug exposure    Maternal gonorrhea in second trimester    GBS (group B Streptococcus carrier), +RV culture, currently pregnant    Previous  delivery, antepartum condition or complication    Current gavin pregnancy with history of congenital heart disease in prior child, antepartum    Cocaine abuse affecting pregnancy in second trimester    H/O gestational diabetes in prior pregnancy, currently pregnant, second trimester    UTI in pregnancy    Hx of preeclampsia, prior pregnancy, currently pregnant, third trimester    HRP (high risk pregnancy), unspecified trimester    Labor abnormal    Fetal heart rate decelerations affecting management of mother   Theodore Munroe 18 M Apg 8/9 Wt 6#15    Postpartum state       Today she is doing well without any chief complaint. Her lochia is light. She denies chest pain, shortness of breath, headache and blurred vision. She is  breast feeding and she denies any breast tenderness. She is ambulating well. Her voiding pattern is normal. I reviewed signs and symptoms of post partum depression with the patient, she currently denies any of these symptoms. She is tolerating solids. Flatus present. Bowel movement present.     Vital Signs:  Vitals:    18 1220 18 1632 18 1954 18 0050   BP: 110/62 134/65 117/67 125/78   Pulse: 70 76 67 76   Resp: 16 16 16 16   Temp: 97 °F (36.1 °C) 97.2 °F

## 2018-09-22 VITALS
RESPIRATION RATE: 16 BRPM | OXYGEN SATURATION: 100 % | WEIGHT: 158 LBS | TEMPERATURE: 97.9 F | BODY MASS INDEX: 25.39 KG/M2 | HEART RATE: 67 BPM | HEIGHT: 66 IN | SYSTOLIC BLOOD PRESSURE: 133 MMHG | DIASTOLIC BLOOD PRESSURE: 83 MMHG

## 2018-09-22 PROCEDURE — 6370000000 HC RX 637 (ALT 250 FOR IP): Performed by: STUDENT IN AN ORGANIZED HEALTH CARE EDUCATION/TRAINING PROGRAM

## 2018-09-22 RX ADMIN — DOCUSATE SODIUM 100 MG: 100 CAPSULE, LIQUID FILLED ORAL at 09:13

## 2018-09-22 RX ADMIN — METRONIDAZOLE 500 MG: 500 TABLET ORAL at 09:13

## 2018-09-22 RX ADMIN — NAPROXEN 500 MG: 500 TABLET ORAL at 04:24

## 2018-09-22 RX ADMIN — OXYCODONE HYDROCHLORIDE AND ACETAMINOPHEN 1 TABLET: 5; 325 TABLET ORAL at 09:13

## 2018-09-22 ASSESSMENT — PAIN SCALES - GENERAL
PAINLEVEL_OUTOF10: 6
PAINLEVEL_OUTOF10: 0

## 2018-09-22 NOTE — PROGRESS NOTES
Upon entering the pts room, nurse finds pt sleeping with infant on her chest.  Education provided on safe sleep alone, on their back and in the bassinet.   Pt verbalizes understanding and places infant in bassinet

## 2018-09-22 NOTE — PLAN OF CARE
Problem: Discharge Planning:  Goal: Discharged to appropriate level of care  Discharged to appropriate level of care   Outcome: Completed Date Met: 09/22/18      Problem: Fluid Volume - Imbalance:  Goal: Absence of postpartum hemorrhage signs and symptoms  Absence of postpartum hemorrhage signs and symptoms   Outcome: Completed Date Met: 09/22/18    Goal: Absence of imbalanced fluid volume signs and symptoms  Absence of imbalanced fluid volume signs and symptoms   Outcome: Completed Date Met: 09/22/18      Problem: Infection - Surgical Site:  Goal: Will show no infection signs and symptoms  Will show no infection signs and symptoms   Outcome: Completed Date Met: 09/22/18      Problem: Pain - Acute:  Goal: Pain level will decrease  Pain level will decrease   Outcome: Completed Date Met: 09/22/18

## 2018-09-22 NOTE — PROGRESS NOTES
SpO2:       Weight:       Height:             Urine Input & Output last 24hrs:   No intake or output data in the 24 hours ending 09/22/18 0620    Physical Exam:  General:  no apparent distress, alert and cooperative  Neurologic:  alert, oriented, normal speech, no focal findings or movement disorder noted  Lungs:  No increased work of breathing, good air exchange, clear to auscultation bilaterally, no crackles or wheezing  Heart:  regular rate and rhythm    Abdomen: abdomen soft, non-distended, non-tender  Fundus: non-tender, normal size, firm, below umbilicus  Incision: clean, dry and intact; steri strips in place  Extremities:  no calf tenderness, non edematous      Labs:  Lab Results   Component Value Date    WBC 7.3 09/19/2018    HGB 11.2 (L) 09/19/2018    HCT 33.4 (L) 09/19/2018    MCV 94.7 09/19/2018     (L) 09/19/2018       Assessment/Plan:  1. Brown Mendoza is a [id number] POD # 4 s/p RLTCS        - Doing well, VSS               - male infant in [address], circumcision done              - Encourage ambulation and use of incentive spirometer              - D/C alarcon catheter and saline lock IV on POD #1               - H/H completed  2. Rh positive/Rubella immune  3. both breast and bottle   4. H/O Preeclampsia               - G1              - Denies s/s of pre E               - Pt has not had any elevated BPs overnight               - Pre E labs WNL x1  5. Lost custody of other children               - SW consult complete               -  assigned               - [facility] staffing meeting completed               - Baby will not be discharged home with mom   6. Depression               - Stable on no meds               - Denies s/s of postpartum depression   7. Marijuana use               - UDS positive on admission               - SW consult complete  8. Bacterial vaginosis               - Flagyl 500 mg PO BID x7d   2. Continue post-op care.     Counseling Completed:  Secondary Smoke

## 2018-10-15 ENCOUNTER — OFFICE VISIT (OUTPATIENT)
Dept: OBGYN CLINIC | Age: 28
End: 2018-10-15
Payer: MEDICAID

## 2018-10-15 VITALS
BODY MASS INDEX: 23.32 KG/M2 | HEIGHT: 65 IN | HEART RATE: 78 BPM | SYSTOLIC BLOOD PRESSURE: 112 MMHG | DIASTOLIC BLOOD PRESSURE: 70 MMHG | WEIGHT: 140 LBS

## 2018-10-15 DIAGNOSIS — Z32.02 NEGATIVE PREGNANCY TEST: ICD-10-CM

## 2018-10-15 DIAGNOSIS — N94.6 DYSMENORRHEA: ICD-10-CM

## 2018-10-15 PROBLEM — O09.90 HRP (HIGH RISK PREGNANCY), UNSPECIFIED TRIMESTER: Status: RESOLVED | Noted: 2018-09-18 | Resolved: 2018-10-15

## 2018-10-15 PROBLEM — O09.293 HX OF PREECLAMPSIA, PRIOR PREGNANCY, CURRENTLY PREGNANT, THIRD TRIMESTER: Status: RESOLVED | Noted: 2018-07-23 | Resolved: 2018-10-15

## 2018-10-15 PROBLEM — O98.212 MATERNAL GONORRHEA IN SECOND TRIMESTER: Status: RESOLVED | Noted: 2018-04-10 | Resolved: 2018-10-15

## 2018-10-15 PROBLEM — F14.10 COCAINE ABUSE AFFECTING PREGNANCY IN SECOND TRIMESTER (HCC): Status: RESOLVED | Noted: 2018-05-21 | Resolved: 2018-10-15

## 2018-10-15 PROBLEM — O99.322 COCAINE ABUSE AFFECTING PREGNANCY IN SECOND TRIMESTER (HCC): Status: RESOLVED | Noted: 2018-05-21 | Resolved: 2018-10-15

## 2018-10-15 PROBLEM — O99.820 GBS (GROUP B STREPTOCOCCUS CARRIER), +RV CULTURE, CURRENTLY PREGNANT: Status: RESOLVED | Noted: 2018-04-13 | Resolved: 2018-10-15

## 2018-10-15 PROBLEM — O09.299 CURRENT SINGLETON PREGNANCY WITH HISTORY OF CONGENITAL HEART DISEASE IN PRIOR CHILD, ANTEPARTUM: Status: RESOLVED | Noted: 2018-05-21 | Resolved: 2018-10-15

## 2018-10-15 PROBLEM — O23.40 UTI IN PREGNANCY: Status: RESOLVED | Noted: 2018-05-29 | Resolved: 2018-10-15

## 2018-10-15 PROBLEM — Z86.32 H/O GESTATIONAL DIABETES IN PRIOR PREGNANCY, CURRENTLY PREGNANT, SECOND TRIMESTER: Status: RESOLVED | Noted: 2018-05-21 | Resolved: 2018-10-15

## 2018-10-15 PROBLEM — O09.292 H/O GESTATIONAL DIABETES IN PRIOR PREGNANCY, CURRENTLY PREGNANT, SECOND TRIMESTER: Status: RESOLVED | Noted: 2018-05-21 | Resolved: 2018-10-15

## 2018-10-15 LAB
CONTROL: NORMAL
PREGNANCY TEST URINE, POC: NEGATIVE

## 2018-10-15 PROCEDURE — 99024 POSTOP FOLLOW-UP VISIT: CPT | Performed by: NURSE PRACTITIONER

## 2018-10-15 PROCEDURE — 81025 URINE PREGNANCY TEST: CPT | Performed by: NURSE PRACTITIONER

## 2018-10-15 PROCEDURE — 96372 THER/PROPH/DIAG INJ SC/IM: CPT | Performed by: NURSE PRACTITIONER

## 2018-10-15 RX ORDER — IBUPROFEN 800 MG/1
800 TABLET ORAL EVERY 8 HOURS PRN
Qty: 30 TABLET | Refills: 0 | Status: SHIPPED | OUTPATIENT
Start: 2018-10-15 | End: 2021-01-25

## 2018-10-15 RX ORDER — MEDROXYPROGESTERONE ACETATE 150 MG/ML
150 INJECTION, SUSPENSION INTRAMUSCULAR ONCE
Status: COMPLETED | OUTPATIENT
Start: 2018-10-15 | End: 2018-10-15

## 2018-10-15 RX ADMIN — MEDROXYPROGESTERONE ACETATE 150 MG: 150 INJECTION, SUSPENSION INTRAMUSCULAR at 13:47

## 2018-10-15 NOTE — PROGRESS NOTES
Winston Valentine  2:14 PM  10/15/18            The patient was seen. She has no chief complaints today. She delivered by  section on 2018. Patient reports her baby is in foster care and she gets to visit twice a week. She is not breast feeding and there is not any signs or symptoms of mastitis. The patient completed the E.P.D.S. Evaluation form and scored 12. Patient is a patient at [facility] and was seen today. Received prescription for Wellbutrin and Quetiapine She does not have any signs or symptoms of post partum depression. She denies any suicidal thoughts with a plan, intent to harm others and delusional ideas. Today her lochia is light she denies any dizziness or shortness of breath. Her pregnancy was complicated by:   Patient Active Problem List    Diagnosis Date Noted    Previous  delivery, antepartum condition or complication      Priority: High     Overview Note:     History of 4 prior to C/S   Advise maternal/pelvic MRI (without contrast) at 32 weeks to evaluate placentation for possible accreta/increta/percreta.  RLTCS 10/14/16 M Ap/9 Wt: 5#12 10/14/2016     Priority: High    Marijuana smoker 2016     Priority: High     Overview Note:     3/31/2016 Patient admits to smoking marijuana daily  Instructed to quit/risks related to pregnancy discussed  Patient reports she has an appointment for a class with [facility] for Marijuana cessation.  Depression 2016     Priority: High     Overview Note:     Patient reports having depression and stopping her medication when she found out she was pregnant.   Unsure of name of medication she was taking but has been on it for last 2 years  Will continue with counseling through [facility]  2016 Fetal echo scheduled due to exposure of fetus to antidepressant and anti-anxiety medication      Hx of  x 4 2015     Priority: High     Overview Note:      C/S 31

## 2018-10-22 ENCOUNTER — HOSPITAL ENCOUNTER (OUTPATIENT)
Age: 28
Discharge: HOME | End: 2018-10-23
Attending: EMERGENCY MEDICINE | Admitting: OBSTETRICS & GYNECOLOGY
Payer: MEDICAID

## 2018-10-22 DIAGNOSIS — I10 HYPERTENSION, UNSPECIFIED TYPE: Primary | ICD-10-CM

## 2018-10-22 PROBLEM — Z65.3 LOST CUSTODY OF CHILDREN: Status: ACTIVE | Noted: 2018-10-22

## 2018-10-22 LAB
-: ABNORMAL
ABSOLUTE EOS #: 0.19 K/UL (ref 0–0.44)
ABSOLUTE IMMATURE GRANULOCYTE: <0.03 K/UL (ref 0–0.3)
ABSOLUTE LYMPH #: 3.68 K/UL (ref 1.1–3.7)
ABSOLUTE MONO #: 0.34 K/UL (ref 0.1–1.2)
ALBUMIN SERPL-MCNC: 4 G/DL (ref 3.5–5.2)
ALBUMIN/GLOBULIN RATIO: 1.3 (ref 1–2.5)
ALP BLD-CCNC: 122 U/L (ref 35–104)
ALT SERPL-CCNC: 31 U/L (ref 5–33)
AMORPHOUS: ABNORMAL
AMPHETAMINE SCREEN URINE: NEGATIVE
ANION GAP SERPL CALCULATED.3IONS-SCNC: 15 MMOL/L (ref 9–17)
AST SERPL-CCNC: 30 U/L
BACTERIA: ABNORMAL
BARBITURATE SCREEN URINE: NEGATIVE
BASOPHILS # BLD: 1 % (ref 0–2)
BASOPHILS ABSOLUTE: 0.03 K/UL (ref 0–0.2)
BENZODIAZEPINE SCREEN, URINE: NEGATIVE
BILIRUB SERPL-MCNC: 0.22 MG/DL (ref 0.3–1.2)
BILIRUBIN DIRECT: 0.11 MG/DL
BILIRUBIN URINE: ABNORMAL
BILIRUBIN, INDIRECT: 0.11 MG/DL (ref 0–1)
BUN BLDV-MCNC: 13 MG/DL (ref 6–20)
BUN/CREAT BLD: NORMAL (ref 9–20)
BUPRENORPHINE URINE: ABNORMAL
CALCIUM SERPL-MCNC: 9 MG/DL (ref 8.6–10.4)
CANNABINOID SCREEN URINE: POSITIVE
CASTS UA: ABNORMAL /LPF (ref 0–2)
CHLORIDE BLD-SCNC: 107 MMOL/L (ref 98–107)
CO2: 22 MMOL/L (ref 20–31)
COCAINE METABOLITE, URINE: POSITIVE
COLOR: ABNORMAL
CREAT SERPL-MCNC: 0.73 MG/DL (ref 0.5–0.9)
CREATININE URINE: 252.9 MG/DL (ref 28–217)
CRYSTALS, UA: ABNORMAL /HPF
DIFFERENTIAL TYPE: ABNORMAL
DIRECT EXAM: NORMAL
EOSINOPHILS RELATIVE PERCENT: 3 % (ref 1–4)
EPITHELIAL CELLS UA: ABNORMAL /HPF (ref 0–5)
GFR AFRICAN AMERICAN: >60 ML/MIN
GFR NON-AFRICAN AMERICAN: >60 ML/MIN
GFR SERPL CREATININE-BSD FRML MDRD: NORMAL ML/MIN/{1.73_M2}
GFR SERPL CREATININE-BSD FRML MDRD: NORMAL ML/MIN/{1.73_M2}
GLOBULIN: ABNORMAL G/DL (ref 1.5–3.8)
GLUCOSE BLD-MCNC: 86 MG/DL (ref 70–99)
GLUCOSE URINE: NEGATIVE
HCG(URINE) PREGNANCY TEST: NEGATIVE
HCT VFR BLD CALC: 39.9 % (ref 36.3–47.1)
HEMOGLOBIN: 13 G/DL (ref 11.9–15.1)
IMMATURE GRANULOCYTES: 0 %
KETONES, URINE: ABNORMAL
LEUKOCYTE ESTERASE, URINE: ABNORMAL
LYMPHOCYTES # BLD: 58 % (ref 24–43)
Lab: NORMAL
MCH RBC QN AUTO: 30.4 PG (ref 25.2–33.5)
MCHC RBC AUTO-ENTMCNC: 32.6 G/DL (ref 28.4–34.8)
MCV RBC AUTO: 93.2 FL (ref 82.6–102.9)
MDMA URINE: ABNORMAL
METHADONE SCREEN, URINE: NEGATIVE
METHAMPHETAMINE, URINE: ABNORMAL
MONOCYTES # BLD: 5 % (ref 3–12)
MUCUS: ABNORMAL
NITRITE, URINE: NEGATIVE
NRBC AUTOMATED: 0 PER 100 WBC
OPIATES, URINE: NEGATIVE
OTHER OBSERVATIONS UA: ABNORMAL
OXYCODONE SCREEN URINE: NEGATIVE
PDW BLD-RTO: 12.8 % (ref 11.8–14.4)
PH UA: 6 (ref 5–8)
PHENCYCLIDINE, URINE: NEGATIVE
PLATELET # BLD: 225 K/UL (ref 138–453)
PLATELET ESTIMATE: ABNORMAL
PMV BLD AUTO: 10 FL (ref 8.1–13.5)
POTASSIUM SERPL-SCNC: 4.3 MMOL/L (ref 3.7–5.3)
PROPOXYPHENE, URINE: ABNORMAL
PROTEIN UA: ABNORMAL
RBC # BLD: 4.28 M/UL (ref 3.95–5.11)
RBC # BLD: ABNORMAL 10*6/UL
RBC UA: ABNORMAL /HPF (ref 0–2)
RENAL EPITHELIAL, UA: ABNORMAL /HPF
SEG NEUTROPHILS: 33 % (ref 36–65)
SEGMENTED NEUTROPHILS ABSOLUTE COUNT: 2.12 K/UL (ref 1.5–8.1)
SODIUM BLD-SCNC: 144 MMOL/L (ref 135–144)
SPECIFIC GRAVITY UA: 1.04 (ref 1–1.03)
SPECIMEN DESCRIPTION: NORMAL
STATUS: NORMAL
TEST INFORMATION: ABNORMAL
TOTAL PROTEIN, URINE: 56 MG/DL
TOTAL PROTEIN: 7.2 G/DL (ref 6.4–8.3)
TRICHOMONAS: ABNORMAL
TRICYCLIC ANTIDEPRESSANTS, UR: ABNORMAL
TURBIDITY: ABNORMAL
URINE HGB: ABNORMAL
URINE TOTAL PROTEIN CREATININE RATIO: 0.22 (ref 0–0.2)
UROBILINOGEN, URINE: NORMAL
WBC # BLD: 6.4 K/UL (ref 3.5–11.3)
WBC # BLD: ABNORMAL 10*3/UL
WBC UA: ABNORMAL /HPF (ref 0–5)
YEAST: ABNORMAL

## 2018-10-22 PROCEDURE — 6370000000 HC RX 637 (ALT 250 FOR IP): Performed by: PHYSICIAN ASSISTANT

## 2018-10-22 PROCEDURE — 99284 EMERGENCY DEPT VISIT MOD MDM: CPT

## 2018-10-22 PROCEDURE — 84156 ASSAY OF PROTEIN URINE: CPT

## 2018-10-22 PROCEDURE — 84703 CHORIONIC GONADOTROPIN ASSAY: CPT

## 2018-10-22 PROCEDURE — 82570 ASSAY OF URINE CREATININE: CPT

## 2018-10-22 PROCEDURE — 87491 CHLMYD TRACH DNA AMP PROBE: CPT

## 2018-10-22 PROCEDURE — 80048 BASIC METABOLIC PNL TOTAL CA: CPT

## 2018-10-22 PROCEDURE — 87660 TRICHOMONAS VAGIN DIR PROBE: CPT

## 2018-10-22 PROCEDURE — 80307 DRUG TEST PRSMV CHEM ANLYZR: CPT

## 2018-10-22 PROCEDURE — 87086 URINE CULTURE/COLONY COUNT: CPT

## 2018-10-22 PROCEDURE — 80076 HEPATIC FUNCTION PANEL: CPT

## 2018-10-22 PROCEDURE — 85025 COMPLETE CBC W/AUTO DIFF WBC: CPT

## 2018-10-22 PROCEDURE — 81001 URINALYSIS AUTO W/SCOPE: CPT

## 2018-10-22 PROCEDURE — 87591 N.GONORRHOEAE DNA AMP PROB: CPT

## 2018-10-22 PROCEDURE — 87510 GARDNER VAG DNA DIR PROBE: CPT

## 2018-10-22 PROCEDURE — 6370000000 HC RX 637 (ALT 250 FOR IP): Performed by: EMERGENCY MEDICINE

## 2018-10-22 PROCEDURE — 87480 CANDIDA DNA DIR PROBE: CPT

## 2018-10-22 RX ORDER — BUPROPION HYDROCHLORIDE 150 MG/1
150 TABLET ORAL EVERY MORNING
COMMUNITY
End: 2021-01-25

## 2018-10-22 RX ORDER — BUPROPION HYDROCHLORIDE 150 MG/1
150 TABLET ORAL EVERY MORNING
Status: DISCONTINUED | OUTPATIENT
Start: 2018-10-23 | End: 2018-10-23 | Stop reason: HOSPADM

## 2018-10-22 RX ORDER — QUETIAPINE FUMARATE 25 MG/1
50 TABLET, FILM COATED ORAL 2 TIMES DAILY
Status: DISCONTINUED | OUTPATIENT
Start: 2018-10-22 | End: 2018-10-23 | Stop reason: HOSPADM

## 2018-10-22 RX ORDER — ACETAMINOPHEN 325 MG/1
650 TABLET ORAL EVERY 6 HOURS PRN
Status: DISCONTINUED | OUTPATIENT
Start: 2018-10-22 | End: 2018-10-23 | Stop reason: HOSPADM

## 2018-10-22 RX ORDER — HYDROCODONE BITARTRATE AND ACETAMINOPHEN 5; 325 MG/1; MG/1
1 TABLET ORAL ONCE
Status: COMPLETED | OUTPATIENT
Start: 2018-10-22 | End: 2018-10-22

## 2018-10-22 RX ORDER — ONDANSETRON 2 MG/ML
4 INJECTION INTRAMUSCULAR; INTRAVENOUS EVERY 6 HOURS PRN
Status: DISCONTINUED | OUTPATIENT
Start: 2018-10-22 | End: 2018-10-23 | Stop reason: HOSPADM

## 2018-10-22 RX ORDER — ACETAMINOPHEN 325 MG/1
650 TABLET ORAL ONCE
Status: COMPLETED | OUTPATIENT
Start: 2018-10-22 | End: 2018-10-22

## 2018-10-22 RX ORDER — QUETIAPINE FUMARATE 50 MG/1
50 TABLET, FILM COATED ORAL NIGHTLY
COMMUNITY
End: 2021-01-25

## 2018-10-22 RX ORDER — IBUPROFEN 800 MG/1
800 TABLET ORAL EVERY 8 HOURS PRN
Status: DISCONTINUED | OUTPATIENT
Start: 2018-10-22 | End: 2018-10-23 | Stop reason: HOSPADM

## 2018-10-22 RX ADMIN — HYDROCODONE BITARTRATE AND ACETAMINOPHEN 1 TABLET: 5; 325 TABLET ORAL at 22:25

## 2018-10-22 RX ADMIN — ACETAMINOPHEN 650 MG: 325 TABLET ORAL at 19:33

## 2018-10-22 ASSESSMENT — PAIN DESCRIPTION - PROGRESSION
CLINICAL_PROGRESSION: NOT CHANGED
CLINICAL_PROGRESSION: GRADUALLY WORSENING

## 2018-10-22 ASSESSMENT — PAIN DESCRIPTION - LOCATION
LOCATION: ABDOMEN;HEAD
LOCATION: ABDOMEN;BACK

## 2018-10-22 ASSESSMENT — PAIN SCALES - GENERAL
PAINLEVEL_OUTOF10: 8

## 2018-10-22 ASSESSMENT — ENCOUNTER SYMPTOMS
NAUSEA: 0
ABDOMINAL PAIN: 1
COUGH: 0
BACK PAIN: 1
COLOR CHANGE: 0
DIARRHEA: 0
VOMITING: 0
SHORTNESS OF BREATH: 0
SORE THROAT: 0

## 2018-10-22 ASSESSMENT — PAIN DESCRIPTION - PAIN TYPE
TYPE: ACUTE PAIN
TYPE: ACUTE PAIN

## 2018-10-22 ASSESSMENT — PAIN DESCRIPTION - ORIENTATION: ORIENTATION: LOWER;MID

## 2018-10-22 ASSESSMENT — PAIN DESCRIPTION - FREQUENCY
FREQUENCY: CONTINUOUS
FREQUENCY: CONTINUOUS

## 2018-10-22 ASSESSMENT — PAIN DESCRIPTION - ONSET: ONSET: ON-GOING

## 2018-10-22 ASSESSMENT — PAIN DESCRIPTION - DESCRIPTORS
DESCRIPTORS: ACHING
DESCRIPTORS: ACHING

## 2018-10-22 NOTE — CONSULTS
trimester and in , substance abuse (cocaine) and depression. REVIEW OF SYSTEMS:   A minimum of an eleven point review of systems was completed. Constitutional: negative fever, negative chills  HEENT: negative visual disturbances, negative headaches  Respiratory: negative dyspnea, negative cough  Cardiovascular: negative chest pain, negative palpitations  Gastrointestinal: Positive lower incisional abdominal pain, negative RUQ pain, negative N/V, negative diarrhea, negative constipation  Genitourinary: negative dysuria, negative vaginal discharge, positive vaginal bleeding  Immunologic: negative recent illness, negative recent sick contact, negative allergic reactions  Lymphatic: negative lymph nodes  Musculoskeletal: positive back pain, negative myalgias, negative arthralgias  Neurological:  negative dizziness, negative weakness  Behavior/Psych: negative depression, negative anxiety  _______________________________________________________________________    GYNECOLOGICAL HISTORY:  Age of Menarche: 13  Age of Menopause: NA     Sexually Active: has sex with males   STD History: Gonorrhea 2018, neg ZEE    Pap History: Last PAP was normal; 2018.     Permanent Sterilization: denies  Reversible Birth Control: Depo 10/15/18  Hormone Replacement Exposure: denies    OBSTETRICAL HISTORY:   Obstetric History       T4      L5     SAB2   TAB1   Ectopic0   Molar0   Multiple0   Live Births5       # Outcome Date GA Lbr Lorenzo/2nd Weight Sex Delivery Anes PTL Lv   8 Term 18 37w1d  6 lb 15.8 oz (3.17 kg) M CS-LTranv Spinal N [place]      Name: Caesar Passe:  8                Apgar5: 9   7 Term 10/14/16 37w3d  5 lb 11.7 oz (2.6 kg) M CS-LTranv   [place]      Name: Caesar Passe:  8                Apgar5: 9   6 SAB 10/20/15           5 SAB 2015           4 Term 14 37w0d  6 lb 9 oz (2.977 kg) M CS-LTranv  Y [place]      Complications: PROM (premature rupture of membranes)   3 Term 09 38w0d  5 lb 9 oz (2.523 kg) F CS-LTranv   [place]   2 TAB 06 5w0d    TAB      1  10/20/05 31w0d  2 lb 7 oz (1.106 kg) F CS-LTranv  N [place]      Complications: Toxemia in pregnancy          PAST MEDICAL HISTORY:   has a past medical history of Depression; ESBL (extended spectrum beta-lactamase) producing bacteria infection; Hypertension; Marijuana smoker; and Postpartum depression. PAST SURGICAL HISTORY:   has a past surgical history that includes  section; Cholecystectomy; Cholecystectomy, laparoscopic; and pr  delivery only (N/A, 2018). ALLERGIES:  Allergies as of 10/22/2018 - Review Complete 10/22/2018   Allergen Reaction Noted    Magnesium-containing compounds  2014    Keflex [cephalexin] Nausea And Vomiting 2016       MEDICATIONS:  No current facility-administered medications for this encounter. Current Outpatient Prescriptions   Medication Sig Dispense Refill    buPROPion (WELLBUTRIN XL) 150 MG extended release tablet Take 150 mg by mouth every morning      QUEtiapine (SEROQUEL) 50 MG tablet Take 50 mg by mouth 2 times daily      ibuprofen (ADVIL;MOTRIN) 800 MG tablet Take 1 tablet by mouth every 8 hours as needed for Pain 30 tablet 0    naproxen (NAPROSYN) 500 MG tablet Take 1 tablet by mouth every 12 hours 60 tablet 0    acetaminophen (TYLENOL) 325 MG tablet Take 2 tablets by mouth every 6 hours as needed for Pain 30 tablet 0       FAMILY HISTORY:  family history includes Asthma in her mother; Depression in her mother; Diabetes in her maternal aunt and mother; High Blood Pressure in her maternal aunt and mother; Stroke in her mother. SOCIAL HISTORY:   reports that she has been smoking Cigarettes. She has a 2.50 pack-year smoking history. She has never used smokeless tobacco. She reports that she uses drugs, including Marijuana.  She reports that she does not drink mg/dL    Total Protein 7.2 6.4 - 8.3 g/dL    Globulin NOT REPORTED 1.5 - 3.8 g/dL    Albumin/Globulin Ratio 1.3 1.0 - 2.5   CBC WITH AUTO DIFFERENTIAL   Result Value Ref Range    WBC 6.4 3.5 - 11.3 k/uL    RBC 4.28 3.95 - 5.11 m/uL    Hemoglobin 13.0 11.9 - 15.1 g/dL    Hematocrit 39.9 36.3 - 47.1 %    MCV 93.2 82.6 - 102.9 fL    MCH 30.4 25.2 - 33.5 pg    MCHC 32.6 28.4 - 34.8 g/dL    RDW 12.8 11.8 - 14.4 %    Platelets 514 047 - 110 k/uL    MPV 10.0 8.1 - 13.5 fL    NRBC Automated 0.0 0.0 per 100 WBC    Differential Type NOT REPORTED     Seg Neutrophils 33 (L) 36 - 65 %    Lymphocytes 58 (H) 24 - 43 %    Monocytes 5 3 - 12 %    Eosinophils % 3 1 - 4 %    Basophils 1 0 - 2 %    Immature Granulocytes 0 0 %    Segs Absolute 2.12 1.50 - 8.10 k/uL    Absolute Lymph # 3.68 1.10 - 3.70 k/uL    Absolute Mono # 0.34 0.10 - 1.20 k/uL    Absolute Eos # 0.19 0.00 - 0.44 k/uL    Basophils # 0.03 0.00 - 0.20 k/uL    Absolute Immature Granulocyte <0.03 0.00 - 0.30 k/uL    WBC Morphology NOT REPORTED     RBC Morphology NOT REPORTED     Platelet Estimate NOT REPORTED    Basic Metabolic Panel   Result Value Ref Range    Glucose 86 70 - 99 mg/dL    BUN 13 6 - 20 mg/dL    CREATININE 0.73 0.50 - 0.90 mg/dL    Bun/Cre Ratio NOT REPORTED 9 - 20    Calcium 9.0 8.6 - 10.4 mg/dL    Sodium 144 135 - 144 mmol/L    Potassium 4.3 3.7 - 5.3 mmol/L    Chloride 107 98 - 107 mmol/L    CO2 22 20 - 31 mmol/L    Anion Gap 15 9 - 17 mmol/L    GFR Non-African American >60 >60 mL/min    GFR African American >60 >60 mL/min    GFR Comment          GFR Staging NOT REPORTED    Protein / creatinine ratio, urine   Result Value Ref Range    Total Protein, Urine 56 mg/dL    Creatinine, Ur 252.9 (H) 28.0 - 217.0 mg/dL    Urine Total Protein Creatinine Ratio 0.22 (H) 0.00 - 0.20   Urine Drug Screen   Result Value Ref Range    Amphetamine Screen, Ur NEGATIVE NEG    Barbiturate Screen, Ur NEGATIVE NEG    Benzodiazepine Screen, Urine NEGATIVE NEG    Cocaine Family history of diabetes mellitus in mother 2016     Priority: Medium    History of chlamydia 2016     Priority: Medium    Lost custody of children 10/22/2018    Postpartum state     RLTCS 18 M Apg  Wt 6#15 2018    Previous  delivery, antepartum condition or complication      History of 4 prior to C/S   Advise maternal/pelvic MRI (without contrast) at 32 weeks to evaluate placentation for possible accreta/increta/percreta. Plan discussed with Dr. Tatiana Nascimento, who is agreeable.      Attending's Name: Dr. Willian Faustin DO  Ob/Gyn Resident  PGY3  10/22/2018, 8:38 PM

## 2018-10-23 VITALS
TEMPERATURE: 98 F | HEART RATE: 74 BPM | DIASTOLIC BLOOD PRESSURE: 86 MMHG | BODY MASS INDEX: 23.53 KG/M2 | OXYGEN SATURATION: 98 % | SYSTOLIC BLOOD PRESSURE: 156 MMHG | RESPIRATION RATE: 18 BRPM | WEIGHT: 141.25 LBS | HEIGHT: 65 IN

## 2018-10-23 LAB
ABSOLUTE EOS #: 0.25 K/UL (ref 0–0.44)
ABSOLUTE IMMATURE GRANULOCYTE: <0.03 K/UL (ref 0–0.3)
ABSOLUTE LYMPH #: 3.35 K/UL (ref 1.1–3.7)
ABSOLUTE MONO #: 0.27 K/UL (ref 0.1–1.2)
ALBUMIN SERPL-MCNC: 3.7 G/DL (ref 3.5–5.2)
ALBUMIN/GLOBULIN RATIO: 1.3 (ref 1–2.5)
ALP BLD-CCNC: 116 U/L (ref 35–104)
ALT SERPL-CCNC: 29 U/L (ref 5–33)
ANION GAP SERPL CALCULATED.3IONS-SCNC: 16 MMOL/L (ref 9–17)
AST SERPL-CCNC: 25 U/L
BASOPHILS # BLD: 0 % (ref 0–2)
BASOPHILS ABSOLUTE: <0.03 K/UL (ref 0–0.2)
BILIRUB SERPL-MCNC: <0.1 MG/DL (ref 0.3–1.2)
BUN BLDV-MCNC: 15 MG/DL (ref 6–20)
BUN/CREAT BLD: ABNORMAL (ref 9–20)
C TRACH DNA GENITAL QL NAA+PROBE: NEGATIVE
CALCIUM SERPL-MCNC: 8.9 MG/DL (ref 8.6–10.4)
CHLORIDE BLD-SCNC: 106 MMOL/L (ref 98–107)
CO2: 21 MMOL/L (ref 20–31)
CREAT SERPL-MCNC: 0.85 MG/DL (ref 0.5–0.9)
CULTURE: NORMAL
DIFFERENTIAL TYPE: ABNORMAL
EOSINOPHILS RELATIVE PERCENT: 5 % (ref 1–4)
GFR AFRICAN AMERICAN: >60 ML/MIN
GFR NON-AFRICAN AMERICAN: >60 ML/MIN
GFR SERPL CREATININE-BSD FRML MDRD: ABNORMAL ML/MIN/{1.73_M2}
GFR SERPL CREATININE-BSD FRML MDRD: ABNORMAL ML/MIN/{1.73_M2}
GLUCOSE BLD-MCNC: 118 MG/DL (ref 70–99)
HCT VFR BLD CALC: 38 % (ref 36.3–47.1)
HEMOGLOBIN: 12.1 G/DL (ref 11.9–15.1)
IMMATURE GRANULOCYTES: 0 %
LYMPHOCYTES # BLD: 62 % (ref 24–43)
Lab: NORMAL
MCH RBC QN AUTO: 30.5 PG (ref 25.2–33.5)
MCHC RBC AUTO-ENTMCNC: 31.8 G/DL (ref 28.4–34.8)
MCV RBC AUTO: 95.7 FL (ref 82.6–102.9)
MONOCYTES # BLD: 5 % (ref 3–12)
N. GONORRHOEAE DNA: NEGATIVE
NRBC AUTOMATED: 0 PER 100 WBC
PDW BLD-RTO: 12.7 % (ref 11.8–14.4)
PLATELET # BLD: 220 K/UL (ref 138–453)
PLATELET ESTIMATE: ABNORMAL
PMV BLD AUTO: 10.6 FL (ref 8.1–13.5)
POTASSIUM SERPL-SCNC: 4 MMOL/L (ref 3.7–5.3)
RBC # BLD: 3.97 M/UL (ref 3.95–5.11)
RBC # BLD: ABNORMAL 10*6/UL
SEG NEUTROPHILS: 28 % (ref 36–65)
SEGMENTED NEUTROPHILS ABSOLUTE COUNT: 1.5 K/UL (ref 1.5–8.1)
SODIUM BLD-SCNC: 143 MMOL/L (ref 135–144)
SPECIMEN DESCRIPTION: NORMAL
STATUS: NORMAL
TOTAL PROTEIN: 6.6 G/DL (ref 6.4–8.3)
WBC # BLD: 5.4 K/UL (ref 3.5–11.3)
WBC # BLD: ABNORMAL 10*3/UL

## 2018-10-23 PROCEDURE — 6370000000 HC RX 637 (ALT 250 FOR IP): Performed by: STUDENT IN AN ORGANIZED HEALTH CARE EDUCATION/TRAINING PROGRAM

## 2018-10-23 PROCEDURE — 36415 COLL VENOUS BLD VENIPUNCTURE: CPT

## 2018-10-23 PROCEDURE — 80053 COMPREHEN METABOLIC PANEL: CPT

## 2018-10-23 PROCEDURE — 85025 COMPLETE CBC W/AUTO DIFF WBC: CPT

## 2018-10-23 RX ADMIN — IBUPROFEN 800 MG: 800 TABLET ORAL at 00:09

## 2018-10-23 RX ADMIN — QUETIAPINE FUMARATE 50 MG: 25 TABLET ORAL at 00:09

## 2018-10-23 RX ADMIN — BUPROPION HYDROCHLORIDE 150 MG: 150 TABLET, EXTENDED RELEASE ORAL at 09:10

## 2018-10-23 ASSESSMENT — PAIN DESCRIPTION - DESCRIPTORS: DESCRIPTORS: ACHING

## 2018-10-23 ASSESSMENT — PAIN SCALES - GENERAL
PAINLEVEL_OUTOF10: 6
PAINLEVEL_OUTOF10: 4

## 2018-10-23 ASSESSMENT — PAIN DESCRIPTION - PAIN TYPE: TYPE: ACUTE PAIN

## 2018-10-23 ASSESSMENT — PAIN DESCRIPTION - LOCATION: LOCATION: ABDOMEN;HEAD

## 2018-10-23 ASSESSMENT — PAIN DESCRIPTION - ONSET: ONSET: ON-GOING

## 2018-10-23 ASSESSMENT — PAIN DESCRIPTION - PROGRESSION: CLINICAL_PROGRESSION: NOT CHANGED

## 2018-10-23 ASSESSMENT — PAIN DESCRIPTION - FREQUENCY: FREQUENCY: CONTINUOUS

## 2018-10-23 NOTE — PROGRESS NOTES
k/uL    WBC Morphology NOT REPORTED     RBC Morphology NOT REPORTED     Platelet Estimate NOT REPORTED    Comprehensive Metabolic Panel w/ Reflex to MG   Result Value Ref Range    Glucose 118 (H) 70 - 99 mg/dL    BUN 15 6 - 20 mg/dL    CREATININE 0.85 0.50 - 0.90 mg/dL    Bun/Cre Ratio NOT REPORTED 9 - 20    Calcium 8.9 8.6 - 10.4 mg/dL    Sodium 143 135 - 144 mmol/L    Potassium 4.0 3.7 - 5.3 mmol/L    Chloride 106 98 - 107 mmol/L    CO2 21 20 - 31 mmol/L    Anion Gap 16 9 - 17 mmol/L    Alkaline Phosphatase 116 (H) 35 - 104 U/L    ALT 29 5 - 33 U/L    AST 25 <32 U/L    Total Bilirubin <0.10 (L) 0.3 - 1.2 mg/dL    Total Protein 6.6 6.4 - 8.3 g/dL    Alb 3.7 3.5 - 5.2 g/dL    Albumin/Globulin Ratio 1.3 1.0 - 2.5    GFR Non-African American >60 >60 mL/min    GFR African American >60 >60 mL/min    GFR Comment          GFR Staging NOT REPORTED      Emphasized the importance to the patient and her significant other at bedside that though we are concerned about her abdominal pain, her elevated blood pressures on admission were concerning for potential preeclampsia and with her Hx of preeclampsia, we did not want to just attribute the elevated BPs to cocaine.     Maria L Owens DO  OB/GYN Resident, PGY3  [facility], [address] [place] [address]   10/23/2018, 5:35 AM

## 2018-10-23 NOTE — H&P
Priority: Medium    History of chlamydia 2016     Priority: Medium    Lost custody of children 10/22/2018    Hypertension, postpartum condition or complication 10/30/4019    Postpartum state     RLTCS 18 M Apg / Wt 6#15 2018    Hx of  x5 2018     History of 4 prior to C/S   Advise maternal/pelvic MRI (without contrast) at 32 weeks to evaluate placentation for possible accreta/increta/percreta.  Cocaine abuse 2018       Plan discussed with Dr. Kota De Paz, who is agreeable.      Attending's Name: Dr. Macario Woodruff DO  Ob/Gyn Resident  PGY3  10/22/2018, 10:12 PM

## 2019-03-15 ENCOUNTER — APPOINTMENT (OUTPATIENT)
Dept: GENERAL RADIOLOGY | Facility: CLINIC | Age: 29
End: 2019-03-15
Payer: MEDICAID

## 2019-03-15 ENCOUNTER — HOSPITAL ENCOUNTER (EMERGENCY)
Facility: CLINIC | Age: 29
Discharge: HOME | End: 2019-03-15
Attending: EMERGENCY MEDICINE
Payer: MEDICAID

## 2019-03-15 VITALS
WEIGHT: 130 LBS | HEART RATE: 74 BPM | BODY MASS INDEX: 21.63 KG/M2 | DIASTOLIC BLOOD PRESSURE: 76 MMHG | RESPIRATION RATE: 15 BRPM | OXYGEN SATURATION: 100 % | SYSTOLIC BLOOD PRESSURE: 130 MMHG | TEMPERATURE: 97.5 F

## 2019-03-15 DIAGNOSIS — R11.2 NAUSEA VOMITING AND DIARRHEA: Primary | ICD-10-CM

## 2019-03-15 DIAGNOSIS — R19.7 NAUSEA VOMITING AND DIARRHEA: Primary | ICD-10-CM

## 2019-03-15 DIAGNOSIS — R10.9 ABDOMINAL PAIN, UNSPECIFIED ABDOMINAL LOCATION: ICD-10-CM

## 2019-03-15 LAB
ABSOLUTE EOS #: 0.2 K/UL (ref 0–0.4)
ABSOLUTE IMMATURE GRANULOCYTE: ABNORMAL K/UL (ref 0–0.3)
ABSOLUTE LYMPH #: 2.3 K/UL (ref 1–4.8)
ABSOLUTE MONO #: 0.3 K/UL (ref 0.1–1.2)
ALBUMIN SERPL-MCNC: 4.3 G/DL (ref 3.5–5.2)
ALBUMIN/GLOBULIN RATIO: 1.2 (ref 1–2.5)
ALP BLD-CCNC: 119 U/L (ref 35–104)
ALT SERPL-CCNC: 21 U/L (ref 5–33)
AMYLASE: 132 U/L (ref 28–100)
ANION GAP SERPL CALCULATED.3IONS-SCNC: 13 MMOL/L (ref 9–17)
AST SERPL-CCNC: 19 U/L
BASOPHILS # BLD: 1 % (ref 0–2)
BASOPHILS ABSOLUTE: 0 K/UL (ref 0–0.2)
BILIRUB SERPL-MCNC: <0.1 MG/DL (ref 0.3–1.2)
BUN BLDV-MCNC: 14 MG/DL (ref 6–20)
BUN/CREAT BLD: ABNORMAL (ref 9–20)
CALCIUM SERPL-MCNC: 10 MG/DL (ref 8.6–10.4)
CHLORIDE BLD-SCNC: 104 MMOL/L (ref 98–107)
CO2: 24 MMOL/L (ref 20–31)
CREAT SERPL-MCNC: 0.8 MG/DL (ref 0.5–0.9)
DIFFERENTIAL TYPE: ABNORMAL
EOSINOPHILS RELATIVE PERCENT: 4 % (ref 1–4)
GFR AFRICAN AMERICAN: >60 ML/MIN
GFR NON-AFRICAN AMERICAN: >60 ML/MIN
GFR SERPL CREATININE-BSD FRML MDRD: ABNORMAL ML/MIN/{1.73_M2}
GFR SERPL CREATININE-BSD FRML MDRD: ABNORMAL ML/MIN/{1.73_M2}
GLUCOSE BLD-MCNC: 108 MG/DL (ref 70–99)
HCG QUALITATIVE: NEGATIVE
HCT VFR BLD CALC: 42.1 % (ref 36–46)
HEMOGLOBIN: 13.6 G/DL (ref 12–16)
IMMATURE GRANULOCYTES: ABNORMAL %
LIPASE: 32 U/L (ref 13–60)
LYMPHOCYTES # BLD: 45 % (ref 24–44)
MCH RBC QN AUTO: 29.8 PG (ref 26–34)
MCHC RBC AUTO-ENTMCNC: 32.2 G/DL (ref 31–37)
MCV RBC AUTO: 92.5 FL (ref 80–100)
MONOCYTES # BLD: 6 % (ref 2–11)
NRBC AUTOMATED: ABNORMAL PER 100 WBC
PDW BLD-RTO: 13.8 % (ref 12.5–15.4)
PLATELET # BLD: 255 K/UL (ref 140–450)
PLATELET ESTIMATE: ABNORMAL
PMV BLD AUTO: 8.2 FL (ref 6–12)
POTASSIUM SERPL-SCNC: 4.3 MMOL/L (ref 3.7–5.3)
RBC # BLD: 4.55 M/UL (ref 4–5.2)
RBC # BLD: ABNORMAL 10*6/UL
SEG NEUTROPHILS: 44 % (ref 36–66)
SEGMENTED NEUTROPHILS ABSOLUTE COUNT: 2.2 K/UL (ref 1.8–7.7)
SODIUM BLD-SCNC: 141 MMOL/L (ref 135–144)
TOTAL PROTEIN: 7.9 G/DL (ref 6.4–8.3)
WBC # BLD: 4.9 K/UL (ref 3.5–11)
WBC # BLD: ABNORMAL 10*3/UL

## 2019-03-15 PROCEDURE — 2580000003 HC RX 258: Performed by: EMERGENCY MEDICINE

## 2019-03-15 PROCEDURE — 82150 ASSAY OF AMYLASE: CPT

## 2019-03-15 PROCEDURE — 36415 COLL VENOUS BLD VENIPUNCTURE: CPT

## 2019-03-15 PROCEDURE — 84703 CHORIONIC GONADOTROPIN ASSAY: CPT

## 2019-03-15 PROCEDURE — 74022 RADEX COMPL AQT ABD SERIES: CPT

## 2019-03-15 PROCEDURE — 85025 COMPLETE CBC W/AUTO DIFF WBC: CPT

## 2019-03-15 PROCEDURE — 99284 EMERGENCY DEPT VISIT MOD MDM: CPT

## 2019-03-15 PROCEDURE — 80053 COMPREHEN METABOLIC PANEL: CPT

## 2019-03-15 PROCEDURE — 83690 ASSAY OF LIPASE: CPT

## 2019-03-15 RX ORDER — 0.9 % SODIUM CHLORIDE 0.9 %
1000 INTRAVENOUS SOLUTION INTRAVENOUS ONCE
Status: COMPLETED | OUTPATIENT
Start: 2019-03-15 | End: 2019-03-15

## 2019-03-15 RX ADMIN — SODIUM CHLORIDE 1000 ML: 9 INJECTION, SOLUTION INTRAVENOUS at 18:57

## 2019-03-15 ASSESSMENT — PAIN DESCRIPTION - LOCATION: LOCATION: ABDOMEN

## 2019-03-15 ASSESSMENT — PAIN DESCRIPTION - ORIENTATION: ORIENTATION: MID

## 2019-03-15 ASSESSMENT — PAIN SCALES - GENERAL: PAINLEVEL_OUTOF10: 9

## 2019-10-09 DIAGNOSIS — N92.6 MISSED PERIOD: Primary | ICD-10-CM

## 2019-10-22 ENCOUNTER — HOSPITAL ENCOUNTER (EMERGENCY)
Age: 29
Discharge: HOME | End: 2019-10-22
Attending: EMERGENCY MEDICINE
Payer: MEDICAID

## 2019-10-22 ENCOUNTER — APPOINTMENT (OUTPATIENT)
Dept: ULTRASOUND IMAGING | Age: 29
End: 2019-10-22
Payer: MEDICAID

## 2019-10-22 VITALS
HEIGHT: 65 IN | DIASTOLIC BLOOD PRESSURE: 87 MMHG | OXYGEN SATURATION: 100 % | BODY MASS INDEX: 21.66 KG/M2 | RESPIRATION RATE: 18 BRPM | TEMPERATURE: 98.4 F | HEART RATE: 71 BPM | WEIGHT: 130 LBS | SYSTOLIC BLOOD PRESSURE: 128 MMHG

## 2019-10-22 DIAGNOSIS — O03.9 MISCARRIAGE: Primary | ICD-10-CM

## 2019-10-22 DIAGNOSIS — N93.9 VAGINAL BLEEDING: ICD-10-CM

## 2019-10-22 LAB
-: ABNORMAL
ABSOLUTE EOS #: 0.26 K/UL (ref 0–0.44)
ABSOLUTE IMMATURE GRANULOCYTE: <0.03 K/UL (ref 0–0.3)
ABSOLUTE LYMPH #: 2.43 K/UL (ref 1.1–3.7)
ABSOLUTE MONO #: 0.27 K/UL (ref 0.1–1.2)
ALBUMIN SERPL-MCNC: 3.7 G/DL (ref 3.5–5.2)
ALBUMIN/GLOBULIN RATIO: 1.2 (ref 1–2.5)
ALP BLD-CCNC: 95 U/L (ref 35–104)
ALT SERPL-CCNC: 6 U/L (ref 5–33)
AMORPHOUS: ABNORMAL
ANION GAP SERPL CALCULATED.3IONS-SCNC: 11 MMOL/L (ref 9–17)
AST SERPL-CCNC: 16 U/L
BACTERIA: ABNORMAL
BASOPHILS # BLD: 1 % (ref 0–2)
BASOPHILS ABSOLUTE: 0.03 K/UL (ref 0–0.2)
BILIRUB SERPL-MCNC: 0.22 MG/DL (ref 0.3–1.2)
BILIRUBIN URINE: NEGATIVE
BUN BLDV-MCNC: 10 MG/DL (ref 6–20)
BUN/CREAT BLD: ABNORMAL (ref 9–20)
CALCIUM SERPL-MCNC: 9 MG/DL (ref 8.6–10.4)
CASTS UA: ABNORMAL /LPF (ref 0–2)
CHLORIDE BLD-SCNC: 108 MMOL/L (ref 98–107)
CO2: 23 MMOL/L (ref 20–31)
COLOR: YELLOW
CREAT SERPL-MCNC: 0.59 MG/DL (ref 0.5–0.9)
CRYSTALS, UA: ABNORMAL /HPF
DIFFERENTIAL TYPE: ABNORMAL
DIRECT EXAM: ABNORMAL
EOSINOPHILS RELATIVE PERCENT: 5 % (ref 1–4)
EPITHELIAL CELLS UA: ABNORMAL /HPF (ref 0–5)
GFR AFRICAN AMERICAN: >60 ML/MIN
GFR NON-AFRICAN AMERICAN: >60 ML/MIN
GFR SERPL CREATININE-BSD FRML MDRD: ABNORMAL ML/MIN/{1.73_M2}
GFR SERPL CREATININE-BSD FRML MDRD: ABNORMAL ML/MIN/{1.73_M2}
GLUCOSE BLD-MCNC: 110 MG/DL (ref 70–99)
GLUCOSE URINE: NEGATIVE
HCG QUALITATIVE: POSITIVE
HCG QUANTITATIVE: 517 IU/L
HCT VFR BLD CALC: 39 % (ref 36.3–47.1)
HEMOGLOBIN: 12.9 G/DL (ref 11.9–15.1)
IMMATURE GRANULOCYTES: 0 %
KETONES, URINE: NEGATIVE
LEUKOCYTE ESTERASE, URINE: ABNORMAL
LYMPHOCYTES # BLD: 47 % (ref 24–43)
Lab: ABNORMAL
MCH RBC QN AUTO: 31.2 PG (ref 25.2–33.5)
MCHC RBC AUTO-ENTMCNC: 33.1 G/DL (ref 28.4–34.8)
MCV RBC AUTO: 94.2 FL (ref 82.6–102.9)
MONOCYTES # BLD: 5 % (ref 3–12)
MUCUS: ABNORMAL
NITRITE, URINE: POSITIVE
NRBC AUTOMATED: 0 PER 100 WBC
OTHER OBSERVATIONS UA: ABNORMAL
PDW BLD-RTO: 13.3 % (ref 11.8–14.4)
PH UA: 6 (ref 5–8)
PLATELET # BLD: 240 K/UL (ref 138–453)
PLATELET ESTIMATE: ABNORMAL
PMV BLD AUTO: 10 FL (ref 8.1–13.5)
POTASSIUM SERPL-SCNC: 3.6 MMOL/L (ref 3.7–5.3)
PROTEIN UA: NEGATIVE
RBC # BLD: 4.14 M/UL (ref 3.95–5.11)
RBC # BLD: ABNORMAL 10*6/UL
RBC UA: ABNORMAL /HPF (ref 0–2)
RENAL EPITHELIAL, UA: ABNORMAL /HPF
SEG NEUTROPHILS: 42 % (ref 36–65)
SEGMENTED NEUTROPHILS ABSOLUTE COUNT: 2.21 K/UL (ref 1.5–8.1)
SODIUM BLD-SCNC: 142 MMOL/L (ref 135–144)
SPECIFIC GRAVITY UA: 1.02 (ref 1–1.03)
SPECIMEN DESCRIPTION: ABNORMAL
TOTAL PROTEIN: 6.9 G/DL (ref 6.4–8.3)
TRICHOMONAS: ABNORMAL
TURBIDITY: ABNORMAL
URINE HGB: ABNORMAL
UROBILINOGEN, URINE: NORMAL
WBC # BLD: 5.2 K/UL (ref 3.5–11.3)
WBC # BLD: ABNORMAL 10*3/UL
WBC UA: ABNORMAL /HPF (ref 0–5)
YEAST: ABNORMAL

## 2019-10-22 PROCEDURE — 87491 CHLMYD TRACH DNA AMP PROBE: CPT

## 2019-10-22 PROCEDURE — 87510 GARDNER VAG DNA DIR PROBE: CPT

## 2019-10-22 PROCEDURE — 80053 COMPREHEN METABOLIC PANEL: CPT

## 2019-10-22 PROCEDURE — 87591 N.GONORRHOEAE DNA AMP PROB: CPT

## 2019-10-22 PROCEDURE — 87660 TRICHOMONAS VAGIN DIR PROBE: CPT

## 2019-10-22 PROCEDURE — 76817 TRANSVAGINAL US OBSTETRIC: CPT

## 2019-10-22 PROCEDURE — 84703 CHORIONIC GONADOTROPIN ASSAY: CPT

## 2019-10-22 PROCEDURE — 84702 CHORIONIC GONADOTROPIN TEST: CPT

## 2019-10-22 PROCEDURE — 99284 EMERGENCY DEPT VISIT MOD MDM: CPT

## 2019-10-22 PROCEDURE — 6370000000 HC RX 637 (ALT 250 FOR IP): Performed by: STUDENT IN AN ORGANIZED HEALTH CARE EDUCATION/TRAINING PROGRAM

## 2019-10-22 PROCEDURE — 85025 COMPLETE CBC W/AUTO DIFF WBC: CPT

## 2019-10-22 PROCEDURE — 87480 CANDIDA DNA DIR PROBE: CPT

## 2019-10-22 PROCEDURE — 2580000003 HC RX 258: Performed by: STUDENT IN AN ORGANIZED HEALTH CARE EDUCATION/TRAINING PROGRAM

## 2019-10-22 PROCEDURE — 81001 URINALYSIS AUTO W/SCOPE: CPT

## 2019-10-22 RX ORDER — HYDROCODONE BITARTRATE AND ACETAMINOPHEN 5; 325 MG/1; MG/1
1 TABLET ORAL EVERY 8 HOURS PRN
Qty: 4 TABLET | Refills: 0 | Status: SHIPPED | OUTPATIENT
Start: 2019-10-22 | End: 2019-10-25

## 2019-10-22 RX ORDER — 0.9 % SODIUM CHLORIDE 0.9 %
1000 INTRAVENOUS SOLUTION INTRAVENOUS ONCE
Status: COMPLETED | OUTPATIENT
Start: 2019-10-22 | End: 2019-10-22

## 2019-10-22 RX ORDER — CEPHALEXIN 500 MG/1
500 CAPSULE ORAL ONCE
Status: COMPLETED | OUTPATIENT
Start: 2019-10-22 | End: 2019-10-22

## 2019-10-22 RX ORDER — CEPHALEXIN 500 MG/1
500 CAPSULE ORAL 2 TIMES DAILY
Qty: 14 CAPSULE | Refills: 0 | Status: SHIPPED | OUTPATIENT
Start: 2019-10-22 | End: 2021-01-25

## 2019-10-22 RX ADMIN — CEPHALEXIN 500 MG: 500 CAPSULE ORAL at 15:43

## 2019-10-22 RX ADMIN — SODIUM CHLORIDE 1000 ML: 9 INJECTION, SOLUTION INTRAVENOUS at 14:40

## 2019-10-22 ASSESSMENT — PAIN SCALES - GENERAL: PAINLEVEL_OUTOF10: 9

## 2019-10-22 ASSESSMENT — PAIN DESCRIPTION - LOCATION: LOCATION: ABDOMEN

## 2019-10-22 ASSESSMENT — ENCOUNTER SYMPTOMS
NAUSEA: 1
RHINORRHEA: 0
SORE THROAT: 0
ABDOMINAL PAIN: 1
EYE REDNESS: 0
VOMITING: 1
EYE ITCHING: 0
BACK PAIN: 1
SHORTNESS OF BREATH: 0
COUGH: 0

## 2019-10-23 LAB
C TRACH DNA GENITAL QL NAA+PROBE: NEGATIVE
N. GONORRHOEAE DNA: NEGATIVE
SPECIMEN DESCRIPTION: NORMAL

## 2020-11-24 DIAGNOSIS — N92.6 MISSED PERIOD: Primary | ICD-10-CM

## 2021-01-25 ENCOUNTER — NURSE ONLY (OUTPATIENT)
Dept: OBGYN | Age: 31
End: 2021-01-25
Payer: MEDICAID

## 2021-01-25 VITALS
DIASTOLIC BLOOD PRESSURE: 71 MMHG | BODY MASS INDEX: 18.7 KG/M2 | WEIGHT: 119.13 LBS | SYSTOLIC BLOOD PRESSURE: 111 MMHG | HEIGHT: 67 IN | HEART RATE: 104 BPM

## 2021-01-25 DIAGNOSIS — N91.2 AMENORRHEA: Primary | ICD-10-CM

## 2021-01-25 LAB
CONTROL: POSITIVE
PREGNANCY TEST URINE, POC: POSITIVE

## 2021-01-25 PROCEDURE — 81025 URINE PREGNANCY TEST: CPT | Performed by: OBSTETRICS & GYNECOLOGY

## 2021-01-25 PROCEDURE — 99211 OFF/OP EST MAY X REQ PHY/QHP: CPT | Performed by: OBSTETRICS & GYNECOLOGY

## 2021-01-25 PROCEDURE — 99212 OFFICE O/P EST SF 10 MIN: CPT | Performed by: OBSTETRICS & GYNECOLOGY

## 2021-02-20 ENCOUNTER — TELEPHONE (OUTPATIENT)
Dept: OBGYN | Age: 31
End: 2021-02-20

## 2021-02-20 RX ORDER — ONDANSETRON 4 MG/1
4 TABLET, ORALLY DISINTEGRATING ORAL EVERY 8 HOURS PRN
Qty: 15 TABLET | Refills: 0 | Status: SHIPPED | OUTPATIENT
Start: 2021-02-20

## 2021-02-20 NOTE — TELEPHONE ENCOUNTER
Received a health link stating that patient had vomiting and one episode of diarrhea since yesterday. States that she threw up last night. She has been trying to drink water but states she has a hard time keeping things down. Spoke with patient that with both nausea and diarrhea it could be a viral infection and needs to pass its course. Patient has positive fetal movement, no contractions, leaking fluid or vaginal bleeding. Patient advised to try tylenol and zofran for the nausea and to push fluids/gatorade. Patient advised that if her symptoms get worse or she gets lightheaded or dizzy she can go into the ED. Patient advised to call the clinic on Monday for an apt. Senior resident updated and in agreement with plan.     Annie Braxton DO, PGY-1  Ob/Gyn Resident  [address]  02/20/21

## 2021-02-22 ENCOUNTER — TELEPHONE (OUTPATIENT)
Dept: FAMILY MEDICINE CLINIC | Age: 31
End: 2021-02-22

## 2021-03-23 ENCOUNTER — HOSPITAL ENCOUNTER (OUTPATIENT)
Age: 31
Discharge: HOME | End: 2021-03-23
Attending: OBSTETRICS & GYNECOLOGY | Admitting: OBSTETRICS & GYNECOLOGY
Payer: MEDICAID

## 2021-03-23 VITALS
SYSTOLIC BLOOD PRESSURE: 129 MMHG | TEMPERATURE: 98.1 F | DIASTOLIC BLOOD PRESSURE: 74 MMHG | HEART RATE: 69 BPM | RESPIRATION RATE: 20 BRPM

## 2021-03-23 DIAGNOSIS — Z3A.25 25 WEEKS GESTATION OF PREGNANCY: Primary | ICD-10-CM

## 2021-03-23 PROBLEM — Z86.19 HISTORY OF TRICHOMONAL VAGINITIS: Status: ACTIVE | Noted: 2021-03-23

## 2021-03-23 PROBLEM — O09.92 HIGH-RISK PREGNANCY IN SECOND TRIMESTER: Status: ACTIVE | Noted: 2021-03-23

## 2021-03-23 PROBLEM — Z87.59 HISTORY OF PRE-ECLAMPSIA: Status: ACTIVE | Noted: 2021-03-23

## 2021-03-23 PROBLEM — Z87.59 HISTORY OF PREMATURE RUPTURE OF MEMBRANES: Status: ACTIVE | Noted: 2021-03-23

## 2021-03-23 PROBLEM — Z82.79 FAMILY HISTORY OF CONGENITAL CARDIAC SEPTAL DEFECT: Status: ACTIVE | Noted: 2021-03-23

## 2021-03-23 PROBLEM — Z82.49 FAMILY HISTORY OF CONGENITAL CARDIAC SEPTAL DEFECT: Status: ACTIVE | Noted: 2021-03-23

## 2021-03-23 LAB
-: ABNORMAL
ABO/RH: NORMAL
ABSOLUTE EOS #: 0.09 K/UL (ref 0–0.44)
ABSOLUTE IMMATURE GRANULOCYTE: 0.11 K/UL (ref 0–0.3)
ABSOLUTE LYMPH #: 2.35 K/UL (ref 1.1–3.7)
ABSOLUTE MONO #: 0.48 K/UL (ref 0.1–1.2)
ALBUMIN SERPL-MCNC: 3.3 G/DL (ref 3.5–5.2)
ALBUMIN/GLOBULIN RATIO: 1 (ref 1–2.5)
ALP BLD-CCNC: 82 U/L (ref 35–104)
ALT SERPL-CCNC: 14 U/L (ref 5–33)
AMORPHOUS: ABNORMAL
AMPHETAMINE SCREEN URINE: NEGATIVE
ANION GAP SERPL CALCULATED.3IONS-SCNC: 9 MMOL/L (ref 9–17)
ANTIBODY SCREEN: NEGATIVE
ARM BAND NUMBER: NORMAL
AST SERPL-CCNC: 16 U/L
BACTERIA: ABNORMAL
BARBITURATE SCREEN URINE: NEGATIVE
BASOPHILS # BLD: 1 % (ref 0–2)
BASOPHILS ABSOLUTE: 0.04 K/UL (ref 0–0.2)
BENZODIAZEPINE SCREEN, URINE: NEGATIVE
BILIRUB SERPL-MCNC: 0.21 MG/DL (ref 0.3–1.2)
BILIRUBIN URINE: NEGATIVE
BUN BLDV-MCNC: 7 MG/DL (ref 6–20)
BUN/CREAT BLD: ABNORMAL (ref 9–20)
BUPRENORPHINE URINE: ABNORMAL
CALCIUM SERPL-MCNC: 8.6 MG/DL (ref 8.6–10.4)
CANNABINOID SCREEN URINE: POSITIVE
CASTS UA: ABNORMAL /LPF (ref 0–8)
CHLORIDE BLD-SCNC: 103 MMOL/L (ref 98–107)
CO2: 21 MMOL/L (ref 20–31)
COCAINE METABOLITE, URINE: POSITIVE
COLOR: YELLOW
COMMENT UA: ABNORMAL
CREAT SERPL-MCNC: 0.4 MG/DL (ref 0.5–0.9)
CREATININE URINE: 125.2 MG/DL (ref 28–217)
CRYSTALS, UA: ABNORMAL /HPF
DIFFERENTIAL TYPE: ABNORMAL
EOSINOPHILS RELATIVE PERCENT: 1 % (ref 1–4)
EPITHELIAL CELLS UA: ABNORMAL /HPF (ref 0–5)
EXPIRATION DATE: NORMAL
GFR AFRICAN AMERICAN: >60 ML/MIN
GFR NON-AFRICAN AMERICAN: >60 ML/MIN
GFR SERPL CREATININE-BSD FRML MDRD: ABNORMAL ML/MIN/{1.73_M2}
GFR SERPL CREATININE-BSD FRML MDRD: ABNORMAL ML/MIN/{1.73_M2}
GLUCOSE BLD-MCNC: 76 MG/DL (ref 70–99)
GLUCOSE URINE: NEGATIVE
HCT VFR BLD CALC: 37 % (ref 36.3–47.1)
HEMOGLOBIN: 12 G/DL (ref 11.9–15.1)
HEPATITIS B SURFACE ANTIGEN: NONREACTIVE
HEPATITIS C ANTIBODY: NONREACTIVE
HIV AG/AB: NONREACTIVE
IMMATURE GRANULOCYTES: 1 %
KETONES, URINE: NEGATIVE
LEUKOCYTE ESTERASE, URINE: ABNORMAL
LYMPHOCYTES # BLD: 27 % (ref 24–43)
MCH RBC QN AUTO: 31.8 PG (ref 25.2–33.5)
MCHC RBC AUTO-ENTMCNC: 32.4 G/DL (ref 28.4–34.8)
MCV RBC AUTO: 98.1 FL (ref 82.6–102.9)
MDMA URINE: ABNORMAL
METHADONE SCREEN, URINE: NEGATIVE
METHAMPHETAMINE, URINE: ABNORMAL
MONOCYTES # BLD: 6 % (ref 3–12)
MUCUS: ABNORMAL
NITRITE, URINE: NEGATIVE
NRBC AUTOMATED: 0 PER 100 WBC
OPIATES, URINE: NEGATIVE
OTHER OBSERVATIONS UA: ABNORMAL
OXYCODONE SCREEN URINE: NEGATIVE
PDW BLD-RTO: 13.8 % (ref 11.8–14.4)
PH UA: 6.5 (ref 5–8)
PHENCYCLIDINE, URINE: NEGATIVE
PLATELET # BLD: 197 K/UL (ref 138–453)
PLATELET ESTIMATE: ABNORMAL
PMV BLD AUTO: 10.5 FL (ref 8.1–13.5)
POTASSIUM SERPL-SCNC: 3.8 MMOL/L (ref 3.7–5.3)
PROPOXYPHENE, URINE: ABNORMAL
PROTEIN UA: NEGATIVE
RBC # BLD: 3.77 M/UL (ref 3.95–5.11)
RBC # BLD: ABNORMAL 10*6/UL
RBC UA: ABNORMAL /HPF (ref 0–4)
RENAL EPITHELIAL, UA: ABNORMAL /HPF
RUBV IGG SER QL: 297.6 IU/ML
SEG NEUTROPHILS: 64 % (ref 36–65)
SEGMENTED NEUTROPHILS ABSOLUTE COUNT: 5.66 K/UL (ref 1.5–8.1)
SODIUM BLD-SCNC: 133 MMOL/L (ref 135–144)
SPECIFIC GRAVITY UA: 1.02 (ref 1–1.03)
T. PALLIDUM, IGG: NONREACTIVE
TEST INFORMATION: ABNORMAL
TOTAL PROTEIN, URINE: 21 MG/DL
TOTAL PROTEIN: 6.5 G/DL (ref 6.4–8.3)
TRICHOMONAS: ABNORMAL
TRICYCLIC ANTIDEPRESSANTS, UR: ABNORMAL
TURBIDITY: ABNORMAL
URINE HGB: NEGATIVE
URINE TOTAL PROTEIN CREATININE RATIO: 0.17 (ref 0–0.2)
UROBILINOGEN, URINE: NORMAL
WBC # BLD: 8.7 K/UL (ref 3.5–11.3)
WBC # BLD: ABNORMAL 10*3/UL
WBC UA: ABNORMAL /HPF (ref 0–5)
YEAST: ABNORMAL

## 2021-03-23 PROCEDURE — 36415 COLL VENOUS BLD VENIPUNCTURE: CPT

## 2021-03-23 PROCEDURE — 85025 COMPLETE CBC W/AUTO DIFF WBC: CPT

## 2021-03-23 PROCEDURE — 86901 BLOOD TYPING SEROLOGIC RH(D): CPT

## 2021-03-23 PROCEDURE — 99213 OFFICE O/P EST LOW 20 MIN: CPT

## 2021-03-23 PROCEDURE — 86900 BLOOD TYPING SEROLOGIC ABO: CPT

## 2021-03-23 PROCEDURE — 86850 RBC ANTIBODY SCREEN: CPT

## 2021-03-23 PROCEDURE — 82570 ASSAY OF URINE CREATININE: CPT

## 2021-03-23 PROCEDURE — 87086 URINE CULTURE/COLONY COUNT: CPT

## 2021-03-23 PROCEDURE — 87088 URINE BACTERIA CULTURE: CPT

## 2021-03-23 PROCEDURE — 87340 HEPATITIS B SURFACE AG IA: CPT

## 2021-03-23 PROCEDURE — 99219 PR INITIAL OBSERVATION CARE/DAY 50 MINUTES: CPT | Performed by: OBSTETRICS & GYNECOLOGY

## 2021-03-23 PROCEDURE — 82664 ELECTROPHORETIC TEST: CPT

## 2021-03-23 PROCEDURE — 86803 HEPATITIS C AB TEST: CPT

## 2021-03-23 PROCEDURE — 6370000000 HC RX 637 (ALT 250 FOR IP): Performed by: STUDENT IN AN ORGANIZED HEALTH CARE EDUCATION/TRAINING PROGRAM

## 2021-03-23 PROCEDURE — 80053 COMPREHEN METABOLIC PANEL: CPT

## 2021-03-23 PROCEDURE — 81001 URINALYSIS AUTO W/SCOPE: CPT

## 2021-03-23 PROCEDURE — 87186 SC STD MICRODIL/AGAR DIL: CPT

## 2021-03-23 PROCEDURE — 87389 HIV-1 AG W/HIV-1&-2 AB AG IA: CPT

## 2021-03-23 PROCEDURE — 84156 ASSAY OF PROTEIN URINE: CPT

## 2021-03-23 PROCEDURE — 86762 RUBELLA ANTIBODY: CPT

## 2021-03-23 PROCEDURE — 80307 DRUG TEST PRSMV CHEM ANLYZR: CPT

## 2021-03-23 PROCEDURE — 83020 HEMOGLOBIN ELECTROPHORESIS: CPT

## 2021-03-23 PROCEDURE — 86780 TREPONEMA PALLIDUM: CPT

## 2021-03-23 RX ORDER — ASPIRIN 81 MG/1
81 TABLET ORAL DAILY
Qty: 30 TABLET | Refills: 5 | Status: ON HOLD | OUTPATIENT
Start: 2021-03-23 | End: 2021-07-09 | Stop reason: HOSPADM

## 2021-03-23 RX ORDER — ONDANSETRON 4 MG/1
4 TABLET, FILM COATED ORAL EVERY 8 HOURS PRN
Qty: 30 TABLET | Refills: 0 | Status: SHIPPED | OUTPATIENT
Start: 2021-03-23

## 2021-03-23 RX ORDER — LOPERAMIDE HYDROCHLORIDE 2 MG/1
2 CAPSULE ORAL 4 TIMES DAILY PRN
Qty: 30 CAPSULE | Refills: 0 | Status: SHIPPED | OUTPATIENT
Start: 2021-03-23

## 2021-03-23 RX ORDER — ONDANSETRON 4 MG/1
4 TABLET, ORALLY DISINTEGRATING ORAL ONCE
Status: COMPLETED | OUTPATIENT
Start: 2021-03-23 | End: 2021-03-23

## 2021-03-23 RX ORDER — PNV NO.95/FERROUS FUM/FOLIC AC 28MG-0.8MG
1 TABLET ORAL DAILY
Qty: 30 TABLET | Refills: 5 | Status: SHIPPED | OUTPATIENT
Start: 2021-03-23

## 2021-03-23 RX ORDER — ACETAMINOPHEN 500 MG
1000 TABLET ORAL ONCE
Status: COMPLETED | OUTPATIENT
Start: 2021-03-23 | End: 2021-03-23

## 2021-03-23 RX ADMIN — ACETAMINOPHEN 1000 MG: 500 TABLET ORAL at 16:41

## 2021-03-23 RX ADMIN — ONDANSETRON 4 MG: 4 TABLET, ORALLY DISINTEGRATING ORAL at 16:41

## 2021-03-23 ASSESSMENT — PAIN SCALES - GENERAL: PAINLEVEL_OUTOF10: 8

## 2021-03-23 NOTE — H&P
[facility]    Date: 3/23/2021       Time: 4:55 PM   Patient Name: Mansoor Martinez     Patient : 1990  Room/Bed: 0067/6448-55    Admission Date/Time: 3/23/2021  3:05 PM      CC: Nausea, vomiting, diarrhea     HPI: Mansoor Martinez is a 26 y.o. [id number] at unknown gestational age who presents with nausea, vomiting, and diarrhea which started yesterday. As per patient, she has not had any prenatal care for this pregnancy. Reportedly, her LMP was on 2020. Patient had a positive pregnancy test 21 at [facility] and was scheduled for dating and viability US on 21. However, she missed her appointment. Patient had another appointment scheduled today; however, due to her symptoms of nausea, vomiting, and diarrhea she was instructed to go to [facility] for further evaluation. Patient states her nausea, vomiting, and diarrhea began yesterday. According to the patient, she woke up with nausea and subsequently had several bouts of vomiting throughout the day. She describes the vomitus as nonbloody/nonbilious. Patient reports taking zofran which has since controlled her nausea/vomiting. She denies any episodes of emesis today. Patient also reports foul-smelling watery diarrhea which began yesterday as well. She denies any recent travels or sick contacts. Denies fever, chills, headaches, dizziness, lightheadedness, chest pain, SOB, palpitations, abdominal pain, dysuria, vaginal pain, or vaginal bleeding. The patient reports fetal movement is present, denies contractions, denies loss of fluid, denies vaginal bleeding. Patient states she smoked 10 cigarettes/day since the age of 23; she reports using cocaine with last known use about a year ago. History of  X 5    Of note, patient called health link with similar complaints on 2021. At that time, it was believed she had a viral illness and was instructed to take tylenol and zofran.     Pregnancy is complicated by late/no prenatal care, Hx CS x5, hx PreE, hx GDMA, hx PTD x1, Hx PROM, depression, hx trichomonas, gonorrhea, and chlamydia, FamHx CHD, FHx DM, cocaine abuse, THC abuse, loss of custody of children, BMI 18.7    DATING:  LMP: Patient's last menstrual period was 2020.   Estimated Date of Delivery: None noted, patient does not have dating US completed    PREGNANCY RISK FACTORS:  Patient Active Problem List   Diagnosis    Hx PTD @ 31w (G1)    Hx of  x 5    FHx DM (pt's mother)    Hx GDMA    Marijuana smoker    History of chlamydia    Depression    ?congenital heart defect in previous child    Hx of  x5    Cocaine abuse    Lost custody of children    Hypertension, postpartum condition or complication    High-risk pregnancy in second trimester    Hx STIs    Hx PROM @ 37w0d (G4)    Hx PreE (G1)    FamHx CHD (pt's child)        Steroids Given In This Pregnancy:  no     REVIEW OF SYSTEMS:   Constitutional: negative fever, negative chills, negative weight changes   HEENT: negative visual disturbances, negative headaches, negative dizziness, negative hearing loss  Breast: Negative breast abnormalities, negative breast lumps, negative nipple discharge  Respiratory: negative dyspnea, negative cough, negative SOB  Cardiovascular: negative chest pain,  negative palpitations, negative arrhythmia, negative syncope   Gastrointestinal: positive abdominal pain, N/V/D, negative RUQ pain, negative constipation, negative bowel changes, negative heartburn   Genitourinary: negative dysuria, negative hematuria, negative urinary incontinence, negative vaginal discharge, negative vaginal bleeding or spotting  Dermatological: negative rash, negative pruritis, negative mole or other skin changes  Hematologic: negative bruising  Immunologic/Lymphatic: negative recent illness, negative recent sick contact  Musculoskeletal: negative back pain, negative myalgias, negative arthralgias  Neurological:  negative dizziness, negative migraines, negative seizures, negative weakness  Behavior/Psych: negative depression, negative anxiety, negative SI, negative HI    OBSTETRICAL HISTORY:   OB History    Para Term  AB Living   9 5 4 1 3 5   SAB TAB Ectopic Molar Multiple Live Births   2 1 0 0 0 5      # Outcome Date GA Lbr Lorenzo/2nd Weight Sex Delivery Anes PTL Lv   9 Current            8 Term 18 37w1d  6 lb 15.8 oz (3.17 kg) M CS-LTranv Spinal N [place]      Name: Diana Fisher: 6  Apgar5: 9   7 Term 10/14/16 37w3d  5 lb 11.7 oz (2.6 kg) M CS-LTranv   [place]      Name: Diana Fisher: 8  Apgar5: 9   6 SAB 10/20/15           5 SAB 2015           4 Term 14 37w0d  6 lb 9 oz (2.977 kg) M CS-LTranv  Y [place]      Complications: PROM (premature rupture of membranes)   3 Term 09 38w0d  5 lb 9 oz (2.523 kg) F CS-LTranv   [place]   2 TAB 06 5w0d    TAB      1  10/20/05 31w0d  2 lb 7 oz (1.106 kg) F CS-LTranv  N [place]      Complications: Toxemia in pregnancy       PAST MEDICAL HISTORY:   has a past medical history of Depression, ESBL (extended spectrum beta-lactamase) producing bacteria infection, Marijuana smoker, and Postpartum depression. PAST SURGICAL HISTORY:   has a past surgical history that includes  section; Cholecystectomy; Cholecystectomy, laparoscopic; and pr  delivery only (N/A, 2018). ALLERGIES:  is allergic to magnesium-containing compounds and keflex [cephalexin]. MEDICATIONS:  Prior to Admission medications    Medication Sig Start Date End Date Taking?  Authorizing Provider   ondansetron (ZOFRAN ODT) 4 MG disintegrating tablet Take 1 tablet by mouth every 8 hours as needed for Nausea or Vomiting 21  Yes Annie Liriano DO   acetaminophen (TYLENOL) 325 MG tablet Take 2 tablets by mouth every 6 hours as needed for Pain 18  Yes Cam Polk,        FAMILY HISTORY:  family history includes Asthma in her mother; Depression in her mother; Diabetes in her maternal aunt and mother; High Blood Pressure in her maternal aunt and mother; Stroke in her mother. SOCIAL HISTORY:   reports that she has been smoking cigarettes. She has a 1.25 pack-year smoking history. She has never used smokeless tobacco. She reports current drug use. Drugs: Marijuana and Cocaine. She reports that she does not drink alcohol.     VITALS:  Vitals:    03/23/21 1527   BP: 129/74   Pulse: 69   Resp: 20   Temp: 98.1 °F (36.7 °C)       PHYSICAL EXAM:  FHT: 145 BPM    General appearance:  no apparent distress, alert and cooperative  HEENT: head atraumatic, normocephalic, moist mucous membranes, trachea midline  Neurologic:  alert, oriented, normal speech, no focal findings or movement disorder noted  Lungs:  No increased work of breathing, good air exchange, clear to auscultation bilaterally, no crackles or wheezing  Heart:  regular rate and rhythm and no murmur, rubs, gallops  Abdomen:  soft, gravid, non-tender, no rebound, guarding, or rigidity, no RUQ or epigastric tenderness, no signs or symptoms of abruption,  Extremities:  no calf tenderness, non edematous, no varicosities, full range of motion in all four extremities  Musculoskeletal: Gross strength equal and intact throughout, no gross abnormalities, range of motion normal in hips, knees, shoulders and spine, CVA tenderness: none  Psychiatric: Mood appropriate, normal affect   Rectal Exam: not indicated  Pelvic Exam: not indicated       LIMITED BEDSIDE US:  Fetal HR: 145   Fetal Movement: Present   Est. GA: 22w5d    PRENATAL LAB RESULTS:   Not done, ordered today        ASSESSMENT & PLAN:  Jaime Rosenthal is a 26 y.o. female [id number] with no prenatal care and unknown gestational age    Nausea, Vomiting, Diarrhea   - Presents with 2 day history of nausea, vomiting, and diarrhea    - Patient has been able to tolerate oral intake today    - Denies new food intake or recent sick contacts    - VSS, afebrile    - Patient treated for symptomatic management with Tylenol and Zofran ODT, encouraged PO intake    - Most likely mild viral gastroenteritis. Low suspicion or concern for previable labor, PPROM, or other obstetrical complaint. Will plan to discharge with medication for symptomatic management as long as patient is stable and tolerating PO intake. Patient was given  labor precautions and instructed to call her Ob office or return to L&D Triage if she experiences contractions that increase in intensity or become closer together, if she experiences vaginal bleeding, leakage of fluids, or decreased fetal movements. She states she understands.       Unknown Gestational age   - Patient has positive pregnancy test from 21   - Reports LMP 2020 (which would place patient at [place] [address])   - She was scheduled for dating US  and no-showed, went today and was declined a visit due to illness symptoms    - BSUS obtained showed a viable fetus measuring 22w5d by fetal biometry   - Prenatal labs and baseline PreE labs ordered today    - Will send message to clinic to reschedule patient ASAP for formal dating US and initial prenatal visit     Hx PreE in prior pregnancy    - BP normotensive today    - Denies s/sx PreE    - Rx ASA 81mg will be sent to patient's pharmacy to continue through prengnancy    Hx Indicated PTD @ 31 weeks   - G1, secondary to PreE w/ SF's    Hx CS x 5   - Patient will need close surveillance in pregnancy for placenta accreta and will require repeat  delivery     Hx GDMA in prior pregnancy    - Denies insulin use    Cocaine Use    - UDS positive on admission   - Patient will need SW consult PP    - Encouraged cessation    THC Use    - UDS positive on admission   - Patient will need SW consult PP    - Encouraged cessation    FamHx CHD    - Patient's 1st child with ventricular septal defect requiring repair    - Patient will need MFM referral and fetal echo FamHx DM    - Patient's mother    - Patient should complete early 1-hr GTT     Hx Depression   - Previously on medications, denies current meds   - Denies SI/HI     Tobacco Use    - Encouraged cessation    BMI 18.7       Patient Active Problem List    Diagnosis Date Noted    Hx GDMA 2016     Priority: High    Marijuana smoker 2016     Priority: High     3/31/2016 Patient admits to smoking marijuana daily  Instructed to quit/risks related to pregnancy discussed  Patient reports she has an appointment for a class with [facility] for Marijuana cessation.  Depression 2016     Priority: High     Patient reports having depression and stopping her medication when she found out she was pregnant. Unsure of name of medication she was taking but has been on it for last 2 years  Will continue with counseling through [address] [place] [address] [address]  2016 Fetal echo scheduled due to exposure of fetus to antidepressant and anti-anxiety medication      Hx PTD @ 31w (G1) 2015     Priority: High    Hx of  x 5 2015     Priority: High      C/S 31 weeks   C/S 38 weeks   C/S 37 weeks   C/S 37w3d   C/S 37w1d Cat 2 tracing, decelerations   2018 advise maternal/Pelvic MRI (without contrast) at 30-32 weeks to evaluate placentation for possible accreta/increta/percreta (anterior placenta with history of 4 prior  sections).       ?congenital heart defect in previous child 2016     Priority: Medium     Fetal echo scheduled at 24 weeks        FHx DM (pt's mother) 2016     Priority: Medium    History of chlamydia 2016     Priority: Medium    High-risk pregnancy in second trimester 2021     Needs dating US  22w5d 3/23/21 (ORIN 2021) on BSUS performed at [facility] L&D  Unknown LMP        Hx STIs 2021     Trichomonas: 2013, 1/14/15  Gonorrhea: 1/30/15, 3/14/18, 18      Hx PROM @ 37w0d (G4) 2021    Hx PreE (G1) 2021  FamHx CHD (pt's child) 2021     Child with ventricular septal defect, repaired.  Lost custody of children 10/22/2018    Hypertension, postpartum condition or complication     Hx of  x5 2018     History of 4 prior to C/S   Advise maternal/pelvic MRI (without contrast) at 32 weeks to evaluate placentation for possible accreta/increta/percreta.  Cocaine abuse 2018       Plan discussed with Dr. Samantha Posadas, who is agreeable. Steroids given this admission: No    Risks, benefits, alternatives and possible complications have been discussed in detail with the patient. Admission, and post admission procedures and expectations were discussed in detail. All questions were answered.     Attending's Name: Dr. Tushar Posey DO  Ob/Gyn Resident  3/23/2021, 4:55 PM3

## 2021-03-23 NOTE — PROGRESS NOTES
Obstetric/Gynecology Resident Interval Note    Prenatal labs reviewed. UDS positive for THC and cocaine. The patient has a history of preE in a previous pregnancy. Baseline preE labs obtained and wnl, P/C of 0.17. Patient tolerating PO, overall reassuring maternal and fetal status. LBUS performed with estimated gestational age of 20w7d. Overall stable for discharge at this time with close outpatient follow up. PNV and ASA provided on d/c. Rx for loperamide and zofran also sent to pharmacy. Message sent to clinic for patient to establish care and undergo official dating/anatomy ultrasound. The patient has a history of five prior  sections and desres a repeat  section. Discussed at length the risks and benefits of concurrent bilateral salpingectomy with patient's upcoming schedule abdominal or pelvic surgery per recommendation of the Society of Gynecologic Oncology, American College of Obstetricians and Gynecologists as this patient is at above average risk for development of ovarian cancer. Advised patient that the primary benefit of such surgery is a 65% reduction in future risk of ovarian cancer. Patient advised that large scale studies have not demonstrated an increase in estimated blood loss, complications, or operating time but that bleeding, infection, and injury to nearby organs are potential complications with this additional surgery. Finally, patient has been thoroughly counseled regarding the consequence of loss of fertility following this procedure. Patient understands that this loss of fertility can not be reversed and has expressed via verbal and written consent that her wishes are to proceed with the this surgery for the purposes of ovarian cancer reduction at the time of her repeat  section. Medicaid consent form signed.     Vitals:    21 1527   BP: 129/74   Pulse: 69   Resp: 20   Temp: 98.1 °F (36.7 °C)   TempSrc: Oral        Recent Results (from the past 24 hour(s)) PRENATAL PROFILE I    Collection Time: 03/23/21  4:30 PM   Result Value Ref Range    WBC 8.7 3.5 - 11.3 k/uL    RBC 3.77 (L) 3.95 - 5.11 m/uL    Hemoglobin 12.0 11.9 - 15.1 g/dL    Hematocrit 37.0 36.3 - 47.1 %    MCV 98.1 82.6 - 102.9 fL    MCH 31.8 25.2 - 33.5 pg    MCHC 32.4 28.4 - 34.8 g/dL    RDW 13.8 11.8 - 14.4 %    Platelets 595 818 - 804 k/uL    MPV 10.5 8.1 - 13.5 fL    NRBC Automated 0.0 0.0 per 100 WBC    Differential Type NOT REPORTED     Seg Neutrophils 64 36 - 65 %    Lymphocytes 27 24 - 43 %    Monocytes 6 3 - 12 %    Eosinophils % 1 1 - 4 %    Basophils 1 0 - 2 %    Immature Granulocytes 1 (H) 0 %    Segs Absolute 5.66 1.50 - 8.10 k/uL    Absolute Lymph # 2.35 1.10 - 3.70 k/uL    Absolute Mono # 0.48 0.10 - 1.20 k/uL    Absolute Eos # 0.09 0.00 - 0.44 k/uL    Basophils Absolute 0.04 0.00 - 0.20 k/uL    Absolute Immature Granulocyte 0.11 0.00 - 0.30 k/uL    WBC Morphology NOT REPORTED     RBC Morphology NOT REPORTED     Platelet Estimate NOT REPORTED     Hepatitis B Surface Ag NONREACTIVE NONREACTIVE    Rubella Antibody, .6 IU/mL    T. pallidum, IgG NONREACTIVE NONREACTIVE   TYPE AND SCREEN    Collection Time: 03/23/21  4:30 PM   Result Value Ref Range    Expiration Date 03/26/2021,2359     Arm Band Number [id number]     ABO/Rh O POSITIVE     Antibody Screen NEGATIVE    HEPATITIS C ANTIBODY    Collection Time: 03/23/21  4:30 PM   Result Value Ref Range    Hepatitis C Ab NONREACTIVE NONREACTIVE   HIV Screen    Collection Time: 03/23/21  4:30 PM   Result Value Ref Range    HIV Ag/Ab NONREACTIVE NONREACTIVE   COMPREHENSIVE METABOLIC PANEL    Collection Time: 03/23/21  4:30 PM   Result Value Ref Range    Glucose 76 70 - 99 mg/dL    BUN 7 6 - 20 mg/dL    CREATININE 0.40 (L) 0.50 - 0.90 mg/dL    Bun/Cre Ratio NOT REPORTED 9 - 20    Calcium 8.6 8.6 - 10.4 mg/dL    Sodium 133 (L) 135 - 144 mmol/L    Potassium 3.8 3.7 - 5.3 mmol/L    Chloride 103 98 - 107 mmol/L    CO2 21 20 - 31 mmol/L    Anion Gap 9 9 - 17 mmol/L    Alkaline Phosphatase 82 35 - 104 U/L    ALT 14 5 - 33 U/L    AST 16 <32 U/L    Total Bilirubin 0.21 (L) 0.3 - 1.2 mg/dL    Total Protein 6.5 6.4 - 8.3 g/dL    Albumin 3.3 (L) 3.5 - 5.2 g/dL    Albumin/Globulin Ratio 1.0 1.0 - 2.5    GFR Non-African American >60 >60 mL/min    GFR African American >60 >60 mL/min    GFR Comment          GFR Staging NOT REPORTED    Urinalysis Reflex to Culture    Collection Time: 03/23/21  4:33 PM    Specimen: Urine, clean catch   Result Value Ref Range    Color, UA YELLOW YELLOW    Turbidity UA CLOUDY (A) CLEAR    Glucose, Ur NEGATIVE NEGATIVE    Bilirubin Urine NEGATIVE NEGATIVE    Ketones, Urine NEGATIVE NEGATIVE    Specific Gravity, UA 1.023 1.005 - 1.030    Urine Hgb NEGATIVE NEGATIVE    pH, UA 6.5 5.0 - 8.0    Protein, UA NEGATIVE NEGATIVE    Urobilinogen, Urine Normal Normal    Nitrite, Urine NEGATIVE NEGATIVE    Leukocyte Esterase, Urine TRACE (A) NEGATIVE    Urinalysis Comments NOT REPORTED    Microscopic Urinalysis    Collection Time: 03/23/21  4:33 PM   Result Value Ref Range    -          WBC, UA 20 TO 50 0 - 5 /HPF    RBC, UA 0 TO 2 0 - 4 /HPF    Casts UA  0 - 8 /LPF     5 TO 10 HYALINE Reference range defined for non-centrifuged specimen. Crystals, UA NOT REPORTED None /HPF    Epithelial Cells UA 5 TO 10 0 - 5 /HPF    Renal Epithelial, UA NOT REPORTED 0 /HPF    Bacteria, UA MANY (A) None    Mucus, UA NOT REPORTED None    Trichomonas, UA NOT REPORTED None    Amorphous, UA NOT REPORTED None    Other Observations UA NOT REPORTED NOT REQ.     Yeast, UA NOT REPORTED None   Urine Drug Screen    Collection Time: 03/23/21  4:36 PM   Result Value Ref Range    Amphetamine Screen, Ur NEGATIVE NEGATIVE    Barbiturate Screen, Ur NEGATIVE NEGATIVE    Benzodiazepine Screen, Urine NEGATIVE NEGATIVE    Cocaine Metabolite, Urine POSITIVE (A) NEGATIVE    Methadone Screen, Urine NEGATIVE NEGATIVE    Opiates, Urine NEGATIVE NEGATIVE    Phencyclidine, Urine NEGATIVE NEGATIVE Propoxyphene, Urine NOT REPORTED NEGATIVE    Cannabinoid Scrn, Ur POSITIVE (A) NEGATIVE    Oxycodone Screen, Ur NEGATIVE NEGATIVE    Methamphetamine, Urine NOT REPORTED NEGATIVE    Tricyclic Antidepressants, Urine NOT REPORTED NEGATIVE    MDMA, Urine NOT REPORTED NEGATIVE    Buprenorphine Urine NOT REPORTED NEGATIVE    Test Information       Assay provides medical screening only. The absence of expected drug(s) and/or metabolite(s) may indicate diluted or adulterated urine, limitations of testing or timing of collection. Protein / Creatinine Ratio, Urine    Collection Time: 03/23/21  4:37 PM   Result Value Ref Range    Total Protein, Urine 21 mg/dL    Creatinine, Ur 125.2 28.0 - 217.0 mg/dL    Urine Total Protein Creatinine Ratio 0.17 0.00 - 0.20        Patient stable for discharge. Given strict precautions to return if symptoms worsens. All questions answered at this time. Patient stable for d/c with close outpatient follow up.     Chance Whyte DO  OB/GYN Resident, PGY2  [facility]  3/23/2021, 7:23 PM

## 2021-03-24 LAB
HGB ELECTROPHORESIS INTERP: NORMAL
PATHOLOGIST: NORMAL

## 2021-03-25 LAB
CULTURE: ABNORMAL
Lab: ABNORMAL
SPECIMEN DESCRIPTION: ABNORMAL

## 2021-03-30 ENCOUNTER — HOSPITAL ENCOUNTER (OUTPATIENT)
Age: 31
Discharge: LEFT BEFORE DISCHARGE | End: 2021-03-30
Attending: OBSTETRICS & GYNECOLOGY | Admitting: OBSTETRICS & GYNECOLOGY
Payer: MEDICAID

## 2021-03-30 PROCEDURE — 99213 OFFICE O/P EST LOW 20 MIN: CPT

## 2021-03-31 ENCOUNTER — TELEPHONE (OUTPATIENT)
Dept: OBGYN | Age: 31
End: 2021-03-31

## 2021-04-09 ENCOUNTER — TELEPHONE (OUTPATIENT)
Dept: OBGYN | Age: 31
End: 2021-04-09

## 2021-05-04 ENCOUNTER — HOSPITAL ENCOUNTER (OUTPATIENT)
Age: 31
Setting detail: SPECIMEN
Discharge: HOME | End: 2021-05-04
Payer: MEDICAID

## 2021-05-04 ENCOUNTER — INITIAL PRENATAL (OUTPATIENT)
Dept: OBGYN | Age: 31
End: 2021-05-04
Payer: MEDICAID

## 2021-05-04 VITALS
BODY MASS INDEX: 20.72 KG/M2 | WEIGHT: 132 LBS | HEIGHT: 67 IN | HEART RATE: 114 BPM | SYSTOLIC BLOOD PRESSURE: 126 MMHG | DIASTOLIC BLOOD PRESSURE: 81 MMHG

## 2021-05-04 DIAGNOSIS — Z3A.25 25 WEEKS GESTATION OF PREGNANCY: ICD-10-CM

## 2021-05-04 DIAGNOSIS — O09.93 HIGH-RISK PREGNANCY IN THIRD TRIMESTER: ICD-10-CM

## 2021-05-04 DIAGNOSIS — O99.810 ABNORMAL GLUCOSE TOLERANCE TEST (GTT) DURING PREGNANCY, ANTEPARTUM: ICD-10-CM

## 2021-05-04 DIAGNOSIS — Z3A.28 28 WEEKS GESTATION OF PREGNANCY: ICD-10-CM

## 2021-05-04 DIAGNOSIS — O24.410 DIET CONTROLLED GESTATIONAL DIABETES MELLITUS (GDM) IN THIRD TRIMESTER: Primary | ICD-10-CM

## 2021-05-04 DIAGNOSIS — O34.219 PREVIOUS CESAREAN DELIVERY, ANTEPARTUM CONDITION OR COMPLICATION: ICD-10-CM

## 2021-05-04 DIAGNOSIS — O09.93 HIGH-RISK PREGNANCY IN THIRD TRIMESTER: Primary | ICD-10-CM

## 2021-05-04 DIAGNOSIS — Z82.49 FAMILY HISTORY OF CONGENITAL CARDIAC SEPTAL DEFECT: ICD-10-CM

## 2021-05-04 LAB
ABSOLUTE EOS #: 0.09 K/UL (ref 0–0.44)
ABSOLUTE IMMATURE GRANULOCYTE: 0.12 K/UL (ref 0–0.3)
ABSOLUTE LYMPH #: 1.9 K/UL (ref 1.1–3.7)
ABSOLUTE MONO #: 0.47 K/UL (ref 0.1–1.2)
BASOPHILS # BLD: 0 % (ref 0–2)
BASOPHILS ABSOLUTE: 0.03 K/UL (ref 0–0.2)
DIFFERENTIAL TYPE: ABNORMAL
EOSINOPHILS RELATIVE PERCENT: 1 % (ref 1–4)
GLUCOSE ADMINISTRATION: ABNORMAL
GLUCOSE TOLERANCE SCREEN 50G: 205 MG/DL (ref 70–135)
HCT VFR BLD CALC: 35.7 % (ref 36.3–47.1)
HEMOGLOBIN: 11.6 G/DL (ref 11.9–15.1)
IMMATURE GRANULOCYTES: 2 %
LYMPHOCYTES # BLD: 24 % (ref 24–43)
MCH RBC QN AUTO: 31.8 PG (ref 25.2–33.5)
MCHC RBC AUTO-ENTMCNC: 32.5 G/DL (ref 28.4–34.8)
MCV RBC AUTO: 97.8 FL (ref 82.6–102.9)
MONOCYTES # BLD: 6 % (ref 3–12)
NRBC AUTOMATED: 0 PER 100 WBC
PDW BLD-RTO: 13.2 % (ref 11.8–14.4)
PLATELET # BLD: 208 K/UL (ref 138–453)
PLATELET ESTIMATE: ABNORMAL
PMV BLD AUTO: 11.1 FL (ref 8.1–13.5)
RBC # BLD: 3.65 M/UL (ref 3.95–5.11)
RBC # BLD: ABNORMAL 10*6/UL
SEG NEUTROPHILS: 67 % (ref 36–65)
SEGMENTED NEUTROPHILS ABSOLUTE COUNT: 5.24 K/UL (ref 1.5–8.1)
WBC # BLD: 7.9 K/UL (ref 3.5–11.3)
WBC # BLD: ABNORMAL 10*3/UL

## 2021-05-04 PROCEDURE — G8420 CALC BMI NORM PARAMETERS: HCPCS | Performed by: STUDENT IN AN ORGANIZED HEALTH CARE EDUCATION/TRAINING PROGRAM

## 2021-05-04 PROCEDURE — 99213 OFFICE O/P EST LOW 20 MIN: CPT | Performed by: STUDENT IN AN ORGANIZED HEALTH CARE EDUCATION/TRAINING PROGRAM

## 2021-05-04 PROCEDURE — 99211 OFF/OP EST MAY X REQ PHY/QHP: CPT | Performed by: STUDENT IN AN ORGANIZED HEALTH CARE EDUCATION/TRAINING PROGRAM

## 2021-05-04 PROCEDURE — 90715 TDAP VACCINE 7 YRS/> IM: CPT | Performed by: STUDENT IN AN ORGANIZED HEALTH CARE EDUCATION/TRAINING PROGRAM

## 2021-05-04 PROCEDURE — 4004F PT TOBACCO SCREEN RCVD TLK: CPT | Performed by: STUDENT IN AN ORGANIZED HEALTH CARE EDUCATION/TRAINING PROGRAM

## 2021-05-04 PROCEDURE — G8427 DOCREV CUR MEDS BY ELIG CLIN: HCPCS | Performed by: STUDENT IN AN ORGANIZED HEALTH CARE EDUCATION/TRAINING PROGRAM

## 2021-05-04 NOTE — PROGRESS NOTES
of membranes)   3 Term 09 38w0d  5 lb 9 oz (2.523 kg) F CS-LTranv   [place]   2 TAB 06 5w0d    TAB      1  10/20/05 31w0d  2 lb 7 oz (1.106 kg) F CS-LTranv  N [place]      Complications: Toxemia in pregnancy       Past Medical History:   Diagnosis Date    Depression     ESBL (extended spectrum beta-lactamase) producing bacteria infection 9/10/2014    E.  Coli urine    Marijuana smoker 3/31/2016    Postpartum depression      Past Surgical History:   Procedure Laterality Date     SECTION      x 3    CHOLECYSTECTOMY      CHOLECYSTECTOMY, LAPAROSCOPIC      2007    [place]  DELIVERY ONLY N/A 2018     SECTION REPEAT performed by Anitha Yang DO at [facility] OR      Social History     Socioeconomic History    Marital status: Single     Spouse name: Not on file    Number of children: 11    Years of education: Not on file    Highest education level: Not on file   Occupational History    Not on file   Social Needs    Financial resource strain: Not on file    Food insecurity     Worry: Not on file     Inability: Not on file    Transportation needs     Medical: Not on file     Non-medical: Not on file   Tobacco Use    Smoking status: Current Every Day Smoker     Packs/day: 0.25     Years: 5.00     Pack years: 1.25     Types: Cigarettes    Smokeless tobacco: Never Used   Substance and Sexual Activity    Alcohol use: No     Alcohol/week: 0.0 standard drinks    Drug use: Yes     Types: Marijuana, Cocaine     Comment: marijuana yesterday, cocaine months ago    Sexual activity: Yes     Partners: Male   Lifestyle    Physical activity     Days per week: Not on file     Minutes per session: Not on file    Stress: Not on file   Relationships    Social connections     Talks on phone: Not on file     Gets together: Not on file     Attends Hindu service: Not on file     Active member of club or organization: Not on file     Attends meetings of clubs or organizations: Not on file     Relationship status: Not on file    Intimate partner violence     Fear of current or ex partner: Not on file     Emotionally abused: Not on file     Physically abused: Not on file     Forced sexual activity: Not on file   Other Topics Concern    Not on file   Social History Narrative    Not on file     Family History   Problem Relation Age of Onset    Asthma Mother     Stroke Mother     High Blood Pressure Mother     Diabetes Mother     Depression Mother     Diabetes Maternal Aunt     High Blood Pressure Maternal Aunt         [id number]       MEDICATIONS:  Current Outpatient Medications   Medication Sig Dispense Refill    ondansetron (ZOFRAN) 4 MG tablet Take 1 tablet by mouth every 8 hours as needed for Nausea or Vomiting 30 tablet 0    Prenatal Vit-Fe Fumarate-FA (PRENATAL VITAMIN) 27-0.8 MG TABS Take 1 tablet by mouth daily 30 tablet 5    aspirin EC 81 MG EC tablet Take 1 tablet by mouth daily 30 tablet 5    loperamide (IMODIUM) 2 MG capsule Take 1 capsule by mouth 4 times daily as needed for Diarrhea (Patient not taking: Reported on 5/4/2021) 30 capsule 0    ondansetron (ZOFRAN ODT) 4 MG disintegrating tablet Take 1 tablet by mouth every 8 hours as needed for Nausea or Vomiting (Patient not taking: Reported on 5/4/2021) 15 tablet 0    acetaminophen (TYLENOL) 325 MG tablet Take 2 tablets by mouth every 6 hours as needed for Pain (Patient not taking: Reported on 5/4/2021) 30 tablet 0     No current facility-administered medications for this visit. ALLERGIES:  Allergies as of 05/04/2021 - Review Complete 05/04/2021   Allergen Reaction Noted    Magnesium-containing compounds  05/06/2014    Keflex [cephalexin] Nausea And Vomiting 08/19/2016           Physical Exam Completed-See Epic Navigator    Chaperone for Intimate Exam   Chaperone was offered and accepted as part of the rooming process.    Chaperone: Marylou Edmond MA               Assessment:      Pregnancy at 29 and 5/7 weeks    Diagnosis Orders   1. High-risk pregnancy in third trimester  PAP Toshia Dale MD, Maternal Fetal Medicine, [facility]    CBC With Auto Differential    Culture, Urine    Tdap (age 6y and older) IM ([address])   2. Marijuana smoker     3. FamHx CHD (pt's child)     4. Major depressive disorder with current active episode, unspecified depression episode severity, unspecified whether recurrent     5. 28 weeks gestation of pregnancy             Patient Active Problem List    Diagnosis Date Noted    Hx GDMA 2016     Priority: High    Marijuana smoker 2016     Priority: High     21 - reports still smoking weed      Depression 2016     Priority: High     21: Controlled on no medications, follows with [facility] 2x/wk      Hx PTD @ 31w (G1) 2015     Priority: High    FHx DM (pt's mother) 2016     Priority: Medium    History of chlamydia 2016     Priority: Medium    High-risk pregnancy in third trimester 2021     Needs dating US  22w5d 3/23/21 (ORIN 2021) on BSUS performed at [facility] L[facility]D  Unknown LMP        Hx STIs 2021     Trichomonas: 2013, 1/14/15  Gonorrhea: 1/30/15, 3/14/18, 18      Hx PROM @ 37w0d (G4) 2021    Hx PreE (G1) 2021    FamHx CHD (pt's child) 2021     Child (G1) with ventricular septal defect, repaired.  28 weeks gestation of pregnancy     Lost custody of children 10/22/2018    Hx of  x5 2018     History of 5 prior c/s  Advise maternal/pelvic MRI (without contrast) at 32 weeks to evaluate placentation for possible accreta/increta/percreta. Pt desires RRS, medicaid consent signed 3/23/21   APPROVED OVARIAN CA RRBS FORM SCANNED INTO MEDIA UNDER OB RECORD-SK        Cocaine abuse 2018                  Plan:      Initial labs completed. 28 week labs ordered  Prenatal vitamins - rx sent 3/23/21.   Problem list reviewed and updated. NT Screen/Quad Testing and MSAFP Single Marker: not eligible due to [place]  Role of ultrasound in pregnancy discussed; fetal survey: ordered  Amniocentesis discussed: deferred to MFM  NIPT Testing indicated for no prenatal care   Cultures & Wet Prep Collected  Pap smear collected   Tdap ordered and given today  MFM referral placed for fetal anatomic survey  Follow-up in 2 weeks          COUNSELING COMPLETED:  INITIAL OBSTETRICAL VISIT EVALUATION:  The patient was seen full history and physical was completed/reviewed. Cytology was collected for patients over 24years of age. Cultures were collected. The patient was counseled on office policies and she was counseled on termination of pregnancy in the state of [place]. The patient was counseled on Toxoplasmosis, HIV, Tobacco Abuse, Group Beta Strep Infections, Cystic Fibrosis,  Labor precautions and Sickle Cell disease. The patient was counseled on the risks of tobacco abuse. Both maternal and fetal. She was instructed to stop smoking if currently using tobacco. Morbidity, mortality, and cessation programs were reviewed. The risks include but are not limited to increased risks of  labor,  delivery, premature rupture of membranes, intrauterine growth restriction, intrauterine fetal demise and abruptio placenta. Secondary smoke risks were also reviewed. Increases in cancer, respiratory problems, and sudden infant death syndrome were reviewed as well. Route of delivery and counseling on vaginal, operative vaginal, and  sections were completed with the risks of each to both the patient as well as her baby. The possibility of a blood transfusion was discussed as well. The patient was not opposed to receiving a transfusion if needed. Nuchal translucency/Quad Evaluation and MSAFP single marker testing was reviewed in detail with attention to timing of testing and their windows.  For patients beyond the gestational age for Nuchal translucency evaluation Quad testing was recommended. Timing for the Quad test was reviewed. Benefits of the above testing was reviewed. A second trimester amniocentesis was also made available to the patient. Risks, Benefits and non-invasive alternative testing was reviewed. Upon completion of the visit all questions were answered and the patients follow-up and testing schedule were reviewed. T-dap Vaccine recommendations reviewed with the patient. Patient notified of timing of vaccination 27-36 weeks gestation. Patient aware Vaccine is NOT Live. Yes. The patient, Letty Palacios is a 26 y.o. female, was seen with a total time spent of 20 minutes for the visit on this date of service by the E/M provider. The time component had both face to face and non face to face time spent in determining the total time component. Counseling and education regarding her diagnosis listed below and her options regarding those diagnoses were also included in determining her time component. Diagnosis Orders   1. High-risk pregnancy in third trimester  PAP Abdulkadir Hutson MD, Maternal Fetal Medicine, [facility]    CBC With Auto Differential    Culture, Urine    Tdap (age 6y and older) IM ([address])   2. Marijuana smoker     3. FamHx CHD (pt's child)     4. Major depressive disorder with current active episode, unspecified depression episode severity, unspecified whether recurrent     5. 28 weeks gestation of pregnancy          The patient had her preventative health maintenance recommendations and follow-up reviewed with her at the completion of her visit. Noemi Nunez DO          Attending Physician Statement  I have discussed the care of Letty Palacios, including pertinent history and exam findings,  with the resident. I have reviewed the key elements of all parts of the encounter with the resident.   I agree with the assessment, plan and orders as documented by the resident.   (GE Modifier)        Pro Garnica DO

## 2021-05-04 NOTE — PROGRESS NOTES
After obtaining consent, and per orders of Dr. Carlos Garcia, injection of TdAP given in Left deltoid by Marylou MACHADO [phone]. Patient instructed to remain in clinic for 20 minutes afterwards, and to report any adverse reaction to me immediately.

## 2021-05-05 LAB
C TRACH DNA GENITAL QL NAA+PROBE: NEGATIVE
CULTURE: ABNORMAL
DIRECT EXAM: ABNORMAL
HPV SAMPLE: NORMAL
HPV, GENOTYPE 16: NOT DETECTED
HPV, GENOTYPE 18: NOT DETECTED
HPV, HIGH RISK OTHER: NOT DETECTED
HPV, INTERPRETATION: NORMAL
Lab: ABNORMAL
Lab: ABNORMAL
N. GONORRHOEAE DNA: NEGATIVE
SPECIMEN DESCRIPTION: ABNORMAL
SPECIMEN DESCRIPTION: ABNORMAL
SPECIMEN DESCRIPTION: NORMAL
SPECIMEN DESCRIPTION: NORMAL

## 2021-05-06 ENCOUNTER — TELEPHONE (OUTPATIENT)
Dept: PERINATAL CARE | Age: 31
End: 2021-05-06

## 2021-05-06 DIAGNOSIS — O24.410 DIET CONTROLLED GESTATIONAL DIABETES MELLITUS (GDM) IN THIRD TRIMESTER: Primary | ICD-10-CM

## 2021-05-06 LAB — CYTOLOGY REPORT: NORMAL

## 2021-05-06 NOTE — TELEPHONE ENCOUNTER
Glucometer, Test strips, lancets, alcohol swabs Testing four times daily one month supply x three refills. Script called into [address] [place] [address],[address] [facility] [id number].

## 2021-05-17 ENCOUNTER — TELEPHONE (OUTPATIENT)
Dept: OBGYN | Age: 31
End: 2021-05-17

## 2021-05-17 DIAGNOSIS — O23.43 URINARY TRACT INFECTION IN MOTHER DURING THIRD TRIMESTER OF PREGNANCY: Primary | ICD-10-CM

## 2021-05-17 RX ORDER — CEPHALEXIN 500 MG/1
500 CAPSULE ORAL 4 TIMES DAILY
Qty: 28 CAPSULE | Refills: 0 | Status: SHIPPED | OUTPATIENT
Start: 2021-05-17 | End: 2021-05-24

## 2021-05-17 NOTE — TELEPHONE ENCOUNTER
Called patient regarding positive E.coli > 100,000 on urine culture. No answer, did not leave message as voicemail did not confirm patient's identity. Rx Keflex x7d sent to patient's pharmacy. Will attempt again.          Margaret Anand DO   [facility] Ob/Gyn   [facility], [address] [place] [address] [address] [phone]

## 2021-05-27 ENCOUNTER — ROUTINE PRENATAL (OUTPATIENT)
Dept: PERINATAL CARE | Age: 31
End: 2021-05-27
Payer: MEDICAID

## 2021-05-27 VITALS
BODY MASS INDEX: 21.61 KG/M2 | DIASTOLIC BLOOD PRESSURE: 75 MMHG | WEIGHT: 138 LBS | SYSTOLIC BLOOD PRESSURE: 118 MMHG | HEART RATE: 96 BPM | TEMPERATURE: 97.7 F | RESPIRATION RATE: 16 BRPM

## 2021-05-27 DIAGNOSIS — O35.9XX0 FETAL ABNORMALITY AFFECTING MANAGEMENT OF MOTHER, SINGLE OR UNSPECIFIED FETUS: ICD-10-CM

## 2021-05-27 DIAGNOSIS — Z3A.32 32 WEEKS GESTATION OF PREGNANCY: ICD-10-CM

## 2021-05-27 DIAGNOSIS — O09.299 CURRENT SINGLETON PREGNANCY WITH HISTORY OF CONGENITAL HEART DISEASE IN PRIOR CHILD, ANTEPARTUM: Primary | ICD-10-CM

## 2021-05-27 DIAGNOSIS — O24.419 GESTATIONAL DIABETES MELLITUS (GDM), ANTEPARTUM, GESTATIONAL DIABETES METHOD OF CONTROL UNSPECIFIED: ICD-10-CM

## 2021-05-27 DIAGNOSIS — O09.293 HISTORY OF INTRAUTERINE GROWTH RESTRICTION IN PRIOR PREGNANCY, CURRENTLY PREGNANT, THIRD TRIMESTER: ICD-10-CM

## 2021-05-27 DIAGNOSIS — O09.293 HISTORY OF PRE-ECLAMPSIA IN PRIOR PREGNANCY, CURRENTLY PREGNANT IN THIRD TRIMESTER: ICD-10-CM

## 2021-05-27 DIAGNOSIS — O09.33 INSUFFICIENT PRENATAL CARE IN THIRD TRIMESTER: ICD-10-CM

## 2021-05-27 LAB
ABDOMINAL CIRCUMFERENCE: NORMAL
BIPARIETAL DIAMETER: NORMAL
ESTIMATED FETAL WEIGHT: NORMAL
FEMORAL DIAMETER: NORMAL
HC/AC: NORMAL
HEAD CIRCUMFERENCE: NORMAL

## 2021-05-27 PROCEDURE — G8428 CUR MEDS NOT DOCUMENT: HCPCS | Performed by: OBSTETRICS & GYNECOLOGY

## 2021-05-27 PROCEDURE — 76825 ECHO EXAM OF FETAL HEART: CPT | Performed by: OBSTETRICS & GYNECOLOGY

## 2021-05-27 PROCEDURE — 93325 DOPPLER ECHO COLOR FLOW MAPG: CPT | Performed by: OBSTETRICS & GYNECOLOGY

## 2021-05-27 PROCEDURE — G8420 CALC BMI NORM PARAMETERS: HCPCS | Performed by: OBSTETRICS & GYNECOLOGY

## 2021-05-27 PROCEDURE — 76819 FETAL BIOPHYS PROFIL W/O NST: CPT | Performed by: OBSTETRICS & GYNECOLOGY

## 2021-05-27 PROCEDURE — 76827 ECHO EXAM OF FETAL HEART: CPT | Performed by: OBSTETRICS & GYNECOLOGY

## 2021-05-27 PROCEDURE — 76811 OB US DETAILED SNGL FETUS: CPT | Performed by: OBSTETRICS & GYNECOLOGY

## 2021-05-27 PROCEDURE — 99244 OFF/OP CNSLTJ NEW/EST MOD 40: CPT | Performed by: OBSTETRICS & GYNECOLOGY

## 2021-07-08 ENCOUNTER — HOSPITAL ENCOUNTER (INPATIENT)
Age: 31
LOS: 4 days | Discharge: HOME | DRG: 539 | End: 2021-07-12
Attending: OBSTETRICS & GYNECOLOGY | Admitting: OBSTETRICS & GYNECOLOGY
Payer: MEDICAID

## 2021-07-08 PROCEDURE — 2580000003 HC RX 258: Performed by: STUDENT IN AN ORGANIZED HEALTH CARE EDUCATION/TRAINING PROGRAM

## 2021-07-08 PROCEDURE — 80307 DRUG TEST PRSMV CHEM ANLYZR: CPT

## 2021-07-08 PROCEDURE — 1200000000 HC SEMI PRIVATE

## 2021-07-08 PROCEDURE — 6370000000 HC RX 637 (ALT 250 FOR IP): Performed by: STUDENT IN AN ORGANIZED HEALTH CARE EDUCATION/TRAINING PROGRAM

## 2021-07-08 PROCEDURE — 87081 CULTURE SCREEN ONLY: CPT

## 2021-07-08 RX ORDER — 0.9 % SODIUM CHLORIDE 0.9 %
1000 INTRAVENOUS SOLUTION INTRAVENOUS ONCE
Status: COMPLETED | OUTPATIENT
Start: 2021-07-08 | End: 2021-07-09

## 2021-07-08 RX ORDER — ACETAMINOPHEN 500 MG
1000 TABLET ORAL EVERY 6 HOURS PRN
Status: DISCONTINUED | OUTPATIENT
Start: 2021-07-08 | End: 2021-07-09

## 2021-07-08 RX ADMIN — ACETAMINOPHEN 1000 MG: 500 TABLET ORAL at 23:35

## 2021-07-08 RX ADMIN — SODIUM CHLORIDE 1000 ML: 9 INJECTION, SOLUTION INTRAVENOUS at 23:40

## 2021-07-08 ASSESSMENT — PAIN SCALES - GENERAL: PAINLEVEL_OUTOF10: 10

## 2021-07-09 ENCOUNTER — ANESTHESIA (OUTPATIENT)
Dept: LABOR AND DELIVERY | Age: 31
DRG: 539 | End: 2021-07-09
Payer: MEDICAID

## 2021-07-09 ENCOUNTER — ANESTHESIA EVENT (OUTPATIENT)
Dept: LABOR AND DELIVERY | Age: 31
DRG: 539 | End: 2021-07-09
Payer: MEDICAID

## 2021-07-09 VITALS — SYSTOLIC BLOOD PRESSURE: 116 MMHG | OXYGEN SATURATION: 100 % | DIASTOLIC BLOOD PRESSURE: 77 MMHG | TEMPERATURE: 97.7 F

## 2021-07-09 PROBLEM — Z3A.38 38 WEEKS GESTATION OF PREGNANCY: Status: ACTIVE | Noted: 2021-07-09

## 2021-07-09 LAB
-: ABNORMAL
ABSOLUTE EOS #: 0 K/UL (ref 0–0.4)
ABSOLUTE IMMATURE GRANULOCYTE: 0 K/UL (ref 0–0.3)
ABSOLUTE LYMPH #: 1.78 K/UL (ref 1–4.8)
ABSOLUTE MONO #: 0.76 K/UL (ref 0.1–0.8)
AMORPHOUS: ABNORMAL
AMPHETAMINE SCREEN URINE: NEGATIVE
BACTERIA: ABNORMAL
BARBITURATE SCREEN URINE: NEGATIVE
BASOPHILS # BLD: 0 % (ref 0–2)
BASOPHILS ABSOLUTE: 0 K/UL (ref 0–0.2)
BENZODIAZEPINE SCREEN, URINE: NEGATIVE
BILIRUBIN URINE: NEGATIVE
BUPRENORPHINE URINE: ABNORMAL
CANNABINOID SCREEN URINE: POSITIVE
CASTS UA: ABNORMAL /LPF (ref 0–2)
COCAINE METABOLITE, URINE: POSITIVE
COLOR: ABNORMAL
COMMENT UA: ABNORMAL
CRYSTALS, UA: ABNORMAL /HPF
DIFFERENTIAL TYPE: ABNORMAL
EOSINOPHILS RELATIVE PERCENT: 0 % (ref 1–4)
EPITHELIAL CELLS UA: ABNORMAL /HPF (ref 0–5)
GLUCOSE BLD-MCNC: 134 MG/DL (ref 65–105)
GLUCOSE BLD-MCNC: 141 MG/DL (ref 70–99)
GLUCOSE URINE: NEGATIVE
HCT VFR BLD CALC: 28.4 % (ref 36.3–47.1)
HCT VFR BLD CALC: 30.8 % (ref 36.3–47.1)
HCT VFR BLD CALC: 39.3 % (ref 36.3–47.1)
HEMOGLOBIN: 12 G/DL (ref 11.9–15.1)
HEMOGLOBIN: 9.4 G/DL (ref 11.9–15.1)
HEMOGLOBIN: 9.6 G/DL (ref 11.9–15.1)
IMMATURE GRANULOCYTES: 0 %
KETONES, URINE: ABNORMAL
LEUKOCYTE ESTERASE, URINE: ABNORMAL
LYMPHOCYTES # BLD: 7 % (ref 24–44)
MCH RBC QN AUTO: 30 PG (ref 25.2–33.5)
MCH RBC QN AUTO: 30.6 PG (ref 25.2–33.5)
MCH RBC QN AUTO: 30.6 PG (ref 25.2–33.5)
MCHC RBC AUTO-ENTMCNC: 30.5 G/DL (ref 28.4–34.8)
MCHC RBC AUTO-ENTMCNC: 31.2 G/DL (ref 28.4–34.8)
MCHC RBC AUTO-ENTMCNC: 33.1 G/DL (ref 28.4–34.8)
MCV RBC AUTO: 92.5 FL (ref 82.6–102.9)
MCV RBC AUTO: 98.1 FL (ref 82.6–102.9)
MCV RBC AUTO: 98.3 FL (ref 82.6–102.9)
MDMA URINE: ABNORMAL
METHADONE SCREEN, URINE: NEGATIVE
METHAMPHETAMINE, URINE: ABNORMAL
MONOCYTES # BLD: 3 % (ref 1–7)
MORPHOLOGY: NORMAL
MUCUS: ABNORMAL
NITRITE, URINE: NEGATIVE
NRBC AUTOMATED: 0 PER 100 WBC
NRBC AUTOMATED: 0.1 PER 100 WBC
NRBC AUTOMATED: 0.2 PER 100 WBC
NUCLEATED RED BLOOD CELLS: 1 PER 100 WBC
OPIATES, URINE: NEGATIVE
OTHER OBSERVATIONS UA: ABNORMAL
OXYCODONE SCREEN URINE: NEGATIVE
PDW BLD-RTO: 14.3 % (ref 11.8–14.4)
PDW BLD-RTO: 14.6 % (ref 11.8–14.4)
PDW BLD-RTO: 14.7 % (ref 11.8–14.4)
PH UA: 6.5 (ref 5–8)
PHENCYCLIDINE, URINE: NEGATIVE
PLATELET # BLD: 163 K/UL (ref 138–453)
PLATELET # BLD: 168 K/UL (ref 138–453)
PLATELET # BLD: ABNORMAL K/UL (ref 138–453)
PLATELET ESTIMATE: ABNORMAL
PLATELET, FLUORESCENCE: 146 K/UL (ref 138–453)
PLATELET, IMMATURE FRACTION: 6.4 % (ref 1.1–10.3)
PMV BLD AUTO: 11.6 FL (ref 8.1–13.5)
PMV BLD AUTO: 11.9 FL (ref 8.1–13.5)
PMV BLD AUTO: ABNORMAL FL (ref 8.1–13.5)
PROPOXYPHENE, URINE: ABNORMAL
PROTEIN UA: ABNORMAL
RBC # BLD: 3.07 M/UL (ref 3.95–5.11)
RBC # BLD: 3.14 M/UL (ref 3.95–5.11)
RBC # BLD: 4 M/UL (ref 3.95–5.11)
RBC # BLD: ABNORMAL 10*6/UL
RBC UA: ABNORMAL /HPF (ref 0–2)
RENAL EPITHELIAL, UA: ABNORMAL /HPF
SARS-COV-2, RAPID: NOT DETECTED
SEG NEUTROPHILS: 90 % (ref 36–66)
SEGMENTED NEUTROPHILS ABSOLUTE COUNT: 22.86 K/UL (ref 1.8–7.7)
SPECIFIC GRAVITY UA: 1.03 (ref 1–1.03)
SPECIMEN DESCRIPTION: NORMAL
T. PALLIDUM, IGG: NONREACTIVE
TEST INFORMATION: ABNORMAL
TRICHOMONAS: ABNORMAL
TRICYCLIC ANTIDEPRESSANTS, UR: ABNORMAL
TURBIDITY: ABNORMAL
URINE HGB: ABNORMAL
UROBILINOGEN, URINE: NORMAL
WBC # BLD: 10.7 K/UL (ref 3.5–11.3)
WBC # BLD: 25.4 K/UL (ref 3.5–11.3)
WBC # BLD: 8 K/UL (ref 3.5–11.3)
WBC # BLD: ABNORMAL 10*3/UL
WBC UA: ABNORMAL /HPF (ref 0–5)
YEAST: ABNORMAL

## 2021-07-09 PROCEDURE — 2580000003 HC RX 258: Performed by: STUDENT IN AN ORGANIZED HEALTH CARE EDUCATION/TRAINING PROGRAM

## 2021-07-09 PROCEDURE — 6360000002 HC RX W HCPCS: Performed by: STUDENT IN AN ORGANIZED HEALTH CARE EDUCATION/TRAINING PROGRAM

## 2021-07-09 PROCEDURE — 0UT70ZZ RESECTION OF BILATERAL FALLOPIAN TUBES, OPEN APPROACH: ICD-10-PCS | Performed by: OBSTETRICS & GYNECOLOGY

## 2021-07-09 PROCEDURE — 87088 URINE BACTERIA CULTURE: CPT

## 2021-07-09 PROCEDURE — 85027 COMPLETE CBC AUTOMATED: CPT

## 2021-07-09 PROCEDURE — 96372 THER/PROPH/DIAG INJ SC/IM: CPT

## 2021-07-09 PROCEDURE — 88307 TISSUE EXAM BY PATHOLOGIST: CPT

## 2021-07-09 PROCEDURE — 85025 COMPLETE CBC W/AUTO DIFF WBC: CPT

## 2021-07-09 PROCEDURE — 88302 TISSUE EXAM BY PATHOLOGIST: CPT

## 2021-07-09 PROCEDURE — 86900 BLOOD TYPING SEROLOGIC ABO: CPT

## 2021-07-09 PROCEDURE — 81001 URINALYSIS AUTO W/SCOPE: CPT

## 2021-07-09 PROCEDURE — 58611 LIGATE OVIDUCT(S) ADD-ON: CPT | Performed by: OBSTETRICS & GYNECOLOGY

## 2021-07-09 PROCEDURE — 87635 SARS-COV-2 COVID-19 AMP PRB: CPT

## 2021-07-09 PROCEDURE — 2709999900 HC NON-CHARGEABLE SUPPLY: Performed by: OBSTETRICS & GYNECOLOGY

## 2021-07-09 PROCEDURE — 2580000003 HC RX 258: Performed by: NURSE ANESTHETIST, CERTIFIED REGISTERED

## 2021-07-09 PROCEDURE — G0480 DRUG TEST DEF 1-7 CLASSES: HCPCS

## 2021-07-09 PROCEDURE — 87081 CULTURE SCREEN ONLY: CPT

## 2021-07-09 PROCEDURE — 86780 TREPONEMA PALLIDUM: CPT

## 2021-07-09 PROCEDURE — 6360000002 HC RX W HCPCS: Performed by: NURSE ANESTHETIST, CERTIFIED REGISTERED

## 2021-07-09 PROCEDURE — 96375 TX/PRO/DX INJ NEW DRUG ADDON: CPT

## 2021-07-09 PROCEDURE — 87186 SC STD MICRODIL/AGAR DIL: CPT

## 2021-07-09 PROCEDURE — 7100000000 HC PACU RECOVERY - FIRST 15 MIN: Performed by: OBSTETRICS & GYNECOLOGY

## 2021-07-09 PROCEDURE — 7100000001 HC PACU RECOVERY - ADDTL 15 MIN: Performed by: OBSTETRICS & GYNECOLOGY

## 2021-07-09 PROCEDURE — 2500000003 HC RX 250 WO HCPCS: Performed by: NURSE ANESTHETIST, CERTIFIED REGISTERED

## 2021-07-09 PROCEDURE — 85055 RETICULATED PLATELET ASSAY: CPT

## 2021-07-09 PROCEDURE — 82947 ASSAY GLUCOSE BLOOD QUANT: CPT

## 2021-07-09 PROCEDURE — 86901 BLOOD TYPING SEROLOGIC RH(D): CPT

## 2021-07-09 PROCEDURE — 87086 URINE CULTURE/COLONY COUNT: CPT

## 2021-07-09 PROCEDURE — 1220000000 HC SEMI PRIVATE OB R&B

## 2021-07-09 PROCEDURE — 59514 CESAREAN DELIVERY ONLY: CPT | Performed by: OBSTETRICS & GYNECOLOGY

## 2021-07-09 PROCEDURE — 6370000000 HC RX 637 (ALT 250 FOR IP): Performed by: STUDENT IN AN ORGANIZED HEALTH CARE EDUCATION/TRAINING PROGRAM

## 2021-07-09 PROCEDURE — 36415 COLL VENOUS BLD VENIPUNCTURE: CPT

## 2021-07-09 PROCEDURE — 86850 RBC ANTIBODY SCREEN: CPT

## 2021-07-09 PROCEDURE — 3700000000 HC ANESTHESIA ATTENDED CARE: Performed by: OBSTETRICS & GYNECOLOGY

## 2021-07-09 PROCEDURE — 3609079900 HC CESAREAN SECTION: Performed by: OBSTETRICS & GYNECOLOGY

## 2021-07-09 PROCEDURE — 86920 COMPATIBILITY TEST SPIN: CPT

## 2021-07-09 PROCEDURE — 96374 THER/PROPH/DIAG INJ IV PUSH: CPT

## 2021-07-09 PROCEDURE — 3700000001 HC ADD 15 MINUTES (ANESTHESIA): Performed by: OBSTETRICS & GYNECOLOGY

## 2021-07-09 RX ORDER — OXYCODONE HYDROCHLORIDE AND ACETAMINOPHEN 5; 325 MG/1; MG/1
1 TABLET ORAL EVERY 6 HOURS PRN
Qty: 20 TABLET | Refills: 0 | Status: SHIPPED | OUTPATIENT
Start: 2021-07-09 | End: 2021-07-14

## 2021-07-09 RX ORDER — SODIUM CHLORIDE 9 MG/ML
25 INJECTION, SOLUTION INTRAVENOUS PRN
Status: DISCONTINUED | OUTPATIENT
Start: 2021-07-09 | End: 2021-07-09

## 2021-07-09 RX ORDER — NICOTINE POLACRILEX 4 MG
15 LOZENGE BUCCAL PRN
Status: DISCONTINUED | OUTPATIENT
Start: 2021-07-09 | End: 2021-07-09

## 2021-07-09 RX ORDER — LOPERAMIDE HYDROCHLORIDE 2 MG/1
2 CAPSULE ORAL 4 TIMES DAILY PRN
Status: DISCONTINUED | OUTPATIENT
Start: 2021-07-09 | End: 2021-07-12 | Stop reason: HOSPADM

## 2021-07-09 RX ORDER — SODIUM CHLORIDE 0.9 % (FLUSH) 0.9 %
10 SYRINGE (ML) INJECTION PRN
Status: DISCONTINUED | OUTPATIENT
Start: 2021-07-09 | End: 2021-07-12 | Stop reason: HOSPADM

## 2021-07-09 RX ORDER — SODIUM CHLORIDE 9 MG/ML
INJECTION, SOLUTION INTRAVENOUS CONTINUOUS PRN
Status: DISCONTINUED | OUTPATIENT
Start: 2021-07-09 | End: 2021-07-09 | Stop reason: SDUPTHER

## 2021-07-09 RX ORDER — DOCUSATE SODIUM 100 MG/1
100 CAPSULE, LIQUID FILLED ORAL 2 TIMES DAILY
Qty: 60 CAPSULE | Refills: 0 | Status: SHIPPED | OUTPATIENT
Start: 2021-07-09 | End: 2021-08-08

## 2021-07-09 RX ORDER — DEXTROSE MONOHYDRATE 50 MG/ML
100 INJECTION, SOLUTION INTRAVENOUS PRN
Status: DISCONTINUED | OUTPATIENT
Start: 2021-07-09 | End: 2021-07-09

## 2021-07-09 RX ORDER — ONDANSETRON 2 MG/ML
4 INJECTION INTRAMUSCULAR; INTRAVENOUS EVERY 6 HOURS PRN
Status: DISCONTINUED | OUTPATIENT
Start: 2021-07-09 | End: 2021-07-09

## 2021-07-09 RX ORDER — ONDANSETRON 2 MG/ML
4 INJECTION INTRAMUSCULAR; INTRAVENOUS EVERY 6 HOURS PRN
Status: DISCONTINUED | OUTPATIENT
Start: 2021-07-09 | End: 2021-07-11

## 2021-07-09 RX ORDER — POLYETHYLENE GLYCOL 3350 17 G/17G
17 POWDER, FOR SOLUTION ORAL DAILY PRN
Status: DISCONTINUED | OUTPATIENT
Start: 2021-07-09 | End: 2021-07-12 | Stop reason: HOSPADM

## 2021-07-09 RX ORDER — SIMETHICONE 80 MG
80 TABLET,CHEWABLE ORAL EVERY 6 HOURS PRN
Status: DISCONTINUED | OUTPATIENT
Start: 2021-07-09 | End: 2021-07-12 | Stop reason: HOSPADM

## 2021-07-09 RX ORDER — DEXTROSE MONOHYDRATE 25 G/50ML
12.5 INJECTION, SOLUTION INTRAVENOUS PRN
Status: DISCONTINUED | OUTPATIENT
Start: 2021-07-09 | End: 2021-07-09

## 2021-07-09 RX ORDER — VITAMIN A, ASCORBIC ACID, CHOLECALCIFEROL, .ALPHA.-TOCOPHEROL ACETATE, DL-, THIAMINE MONONITRATE, RIBOFLAVIN, NIACINAMIDE, PYRIDOXINE HYDROCHLORIDE, FOLIC ACID, CYANOCOBALAMIN, CALCIUM CARBONATE, IRON, ZINC OXIDE, AND CUPRIC OXIDE 4000; 120; 400; 22; 1.84; 3; 20; 10; 1; 12; 200; 29; 25; 2 [IU]/1; MG/1; [IU]/1; [IU]/1; MG/1; MG/1; MG/1; MG/1; MG/1; UG/1; MG/1; MG/1; MG/1; MG/1
1 TABLET ORAL DAILY
Status: DISCONTINUED | OUTPATIENT
Start: 2021-07-09 | End: 2021-07-12 | Stop reason: HOSPADM

## 2021-07-09 RX ORDER — SODIUM CHLORIDE 9 MG/ML
25 INJECTION, SOLUTION INTRAVENOUS PRN
Status: DISCONTINUED | OUTPATIENT
Start: 2021-07-09 | End: 2021-07-12 | Stop reason: HOSPADM

## 2021-07-09 RX ORDER — SODIUM CHLORIDE 0.9 % (FLUSH) 0.9 %
10 SYRINGE (ML) INJECTION EVERY 12 HOURS SCHEDULED
Status: DISCONTINUED | OUTPATIENT
Start: 2021-07-09 | End: 2021-07-09

## 2021-07-09 RX ORDER — IBUPROFEN 800 MG/1
800 TABLET ORAL EVERY 8 HOURS
Status: DISCONTINUED | OUTPATIENT
Start: 2021-07-09 | End: 2021-07-12 | Stop reason: HOSPADM

## 2021-07-09 RX ORDER — OXYCODONE HYDROCHLORIDE AND ACETAMINOPHEN 5; 325 MG/1; MG/1
2 TABLET ORAL EVERY 4 HOURS PRN
Status: DISCONTINUED | OUTPATIENT
Start: 2021-07-09 | End: 2021-07-12 | Stop reason: HOSPADM

## 2021-07-09 RX ORDER — DEXAMETHASONE SODIUM PHOSPHATE 10 MG/ML
INJECTION INTRAMUSCULAR; INTRAVENOUS PRN
Status: DISCONTINUED | OUTPATIENT
Start: 2021-07-09 | End: 2021-07-09 | Stop reason: SDUPTHER

## 2021-07-09 RX ORDER — GLUCAGON 1 MG/ML
1 KIT INJECTION PRN
Status: DISCONTINUED | OUTPATIENT
Start: 2021-07-09 | End: 2021-07-09

## 2021-07-09 RX ORDER — SODIUM CHLORIDE, SODIUM LACTATE, POTASSIUM CHLORIDE, AND CALCIUM CHLORIDE .6; .31; .03; .02 G/100ML; G/100ML; G/100ML; G/100ML
1000 INJECTION, SOLUTION INTRAVENOUS ONCE
Status: COMPLETED | OUTPATIENT
Start: 2021-07-09 | End: 2021-07-09

## 2021-07-09 RX ORDER — MORPHINE SULFATE 1 MG/ML
INJECTION, SOLUTION EPIDURAL; INTRATHECAL; INTRAVENOUS PRN
Status: DISCONTINUED | OUTPATIENT
Start: 2021-07-09 | End: 2021-07-09 | Stop reason: SDUPTHER

## 2021-07-09 RX ORDER — IBUPROFEN 800 MG/1
800 TABLET ORAL EVERY 8 HOURS PRN
Qty: 60 TABLET | Refills: 0 | Status: SHIPPED | OUTPATIENT
Start: 2021-07-09

## 2021-07-09 RX ORDER — PROPOFOL 10 MG/ML
INJECTION, EMULSION INTRAVENOUS PRN
Status: DISCONTINUED | OUTPATIENT
Start: 2021-07-09 | End: 2021-07-09 | Stop reason: SDUPTHER

## 2021-07-09 RX ORDER — PHENYLEPHRINE HYDROCHLORIDE 10 MG/ML
INJECTION INTRAVENOUS PRN
Status: DISCONTINUED | OUTPATIENT
Start: 2021-07-09 | End: 2021-07-09 | Stop reason: SDUPTHER

## 2021-07-09 RX ORDER — ONDANSETRON 2 MG/ML
INJECTION INTRAMUSCULAR; INTRAVENOUS PRN
Status: DISCONTINUED | OUTPATIENT
Start: 2021-07-09 | End: 2021-07-09 | Stop reason: SDUPTHER

## 2021-07-09 RX ORDER — MIDAZOLAM HYDROCHLORIDE 1 MG/ML
INJECTION INTRAMUSCULAR; INTRAVENOUS PRN
Status: DISCONTINUED | OUTPATIENT
Start: 2021-07-09 | End: 2021-07-09 | Stop reason: SDUPTHER

## 2021-07-09 RX ORDER — TRISODIUM CITRATE DIHYDRATE AND CITRIC ACID MONOHYDRATE 500; 334 MG/5ML; MG/5ML
30 SOLUTION ORAL ONCE
Status: COMPLETED | OUTPATIENT
Start: 2021-07-09 | End: 2021-07-09

## 2021-07-09 RX ORDER — BUPIVACAINE HYDROCHLORIDE 7.5 MG/ML
INJECTION, SOLUTION INTRASPINAL PRN
Status: DISCONTINUED | OUTPATIENT
Start: 2021-07-09 | End: 2021-07-09 | Stop reason: SDUPTHER

## 2021-07-09 RX ORDER — SODIUM CHLORIDE, SODIUM LACTATE, POTASSIUM CHLORIDE, CALCIUM CHLORIDE 600; 310; 30; 20 MG/100ML; MG/100ML; MG/100ML; MG/100ML
INJECTION, SOLUTION INTRAVENOUS CONTINUOUS PRN
Status: DISCONTINUED | OUTPATIENT
Start: 2021-07-09 | End: 2021-07-09 | Stop reason: SDUPTHER

## 2021-07-09 RX ORDER — FERROUS SULFATE 325(65) MG
325 TABLET ORAL 2 TIMES DAILY
Qty: 60 TABLET | Refills: 0 | Status: SHIPPED | OUTPATIENT
Start: 2021-07-09

## 2021-07-09 RX ORDER — NALOXONE HYDROCHLORIDE 0.4 MG/ML
0.4 INJECTION, SOLUTION INTRAMUSCULAR; INTRAVENOUS; SUBCUTANEOUS PRN
Status: DISCONTINUED | OUTPATIENT
Start: 2021-07-09 | End: 2021-07-12 | Stop reason: HOSPADM

## 2021-07-09 RX ORDER — CEPHALEXIN 500 MG/1
500 CAPSULE ORAL EVERY 8 HOURS SCHEDULED
Status: COMPLETED | OUTPATIENT
Start: 2021-07-09 | End: 2021-07-09

## 2021-07-09 RX ORDER — ACETAMINOPHEN 500 MG
1000 TABLET ORAL EVERY 6 HOURS PRN
Status: DISCONTINUED | OUTPATIENT
Start: 2021-07-09 | End: 2021-07-12 | Stop reason: HOSPADM

## 2021-07-09 RX ORDER — SODIUM CHLORIDE, SODIUM LACTATE, POTASSIUM CHLORIDE, CALCIUM CHLORIDE 600; 310; 30; 20 MG/100ML; MG/100ML; MG/100ML; MG/100ML
INJECTION, SOLUTION INTRAVENOUS CONTINUOUS
Status: DISCONTINUED | OUTPATIENT
Start: 2021-07-09 | End: 2021-07-11

## 2021-07-09 RX ORDER — OXYCODONE HYDROCHLORIDE AND ACETAMINOPHEN 5; 325 MG/1; MG/1
1 TABLET ORAL EVERY 4 HOURS PRN
Status: DISCONTINUED | OUTPATIENT
Start: 2021-07-09 | End: 2021-07-12 | Stop reason: HOSPADM

## 2021-07-09 RX ORDER — KETOROLAC TROMETHAMINE 30 MG/ML
30 INJECTION, SOLUTION INTRAMUSCULAR; INTRAVENOUS EVERY 6 HOURS
Status: COMPLETED | OUTPATIENT
Start: 2021-07-09 | End: 2021-07-09

## 2021-07-09 RX ORDER — DIPHENHYDRAMINE HYDROCHLORIDE 50 MG/ML
25 INJECTION INTRAMUSCULAR; INTRAVENOUS EVERY 6 HOURS PRN
Status: DISCONTINUED | OUTPATIENT
Start: 2021-07-09 | End: 2021-07-12 | Stop reason: HOSPADM

## 2021-07-09 RX ORDER — SODIUM CHLORIDE 0.9 % (FLUSH) 0.9 %
10 SYRINGE (ML) INJECTION PRN
Status: DISCONTINUED | OUTPATIENT
Start: 2021-07-09 | End: 2021-07-09

## 2021-07-09 RX ORDER — METRONIDAZOLE 500 MG/1
500 TABLET ORAL EVERY 8 HOURS SCHEDULED
Status: DISPENSED | OUTPATIENT
Start: 2021-07-09 | End: 2021-07-11

## 2021-07-09 RX ORDER — SODIUM CHLORIDE 9 MG/ML
INJECTION, SOLUTION INTRAVENOUS CONTINUOUS
Status: DISCONTINUED | OUTPATIENT
Start: 2021-07-09 | End: 2021-07-09

## 2021-07-09 RX ORDER — BISACODYL 10 MG
10 SUPPOSITORY, RECTAL RECTAL DAILY PRN
Status: DISCONTINUED | OUTPATIENT
Start: 2021-07-09 | End: 2021-07-12 | Stop reason: HOSPADM

## 2021-07-09 RX ORDER — LANOLIN 72 %
OINTMENT (GRAM) TOPICAL
Status: DISCONTINUED | OUTPATIENT
Start: 2021-07-09 | End: 2021-07-12 | Stop reason: HOSPADM

## 2021-07-09 RX ADMIN — PHENYLEPHRINE HYDROCHLORIDE 100 MCG: 10 INJECTION INTRAVENOUS at 03:55

## 2021-07-09 RX ADMIN — PHENYLEPHRINE HYDROCHLORIDE 100 MCG: 10 INJECTION INTRAVENOUS at 04:07

## 2021-07-09 RX ADMIN — MORPHINE SULFATE 0.2 MG: 1 INJECTION, SOLUTION EPIDURAL; INTRATHECAL; INTRAVENOUS at 03:03

## 2021-07-09 RX ADMIN — BUPIVACAINE HYDROCHLORIDE IN DEXTROSE 2 ML: 7.5 INJECTION, SOLUTION SUBARACHNOID at 03:03

## 2021-07-09 RX ADMIN — PHENYLEPHRINE HYDROCHLORIDE 100 MCG: 10 INJECTION INTRAVENOUS at 04:12

## 2021-07-09 RX ADMIN — Medication 500 MG: at 03:06

## 2021-07-09 RX ADMIN — PROPOFOL INJECTABLE EMULSION 20 MG: 10 INJECTION, EMULSION INTRAVENOUS at 04:15

## 2021-07-09 RX ADMIN — OXYCODONE HYDROCHLORIDE AND ACETAMINOPHEN 2 TABLET: 5; 325 TABLET ORAL at 22:14

## 2021-07-09 RX ADMIN — PHENYLEPHRINE HYDROCHLORIDE 100 MCG: 10 INJECTION INTRAVENOUS at 03:33

## 2021-07-09 RX ADMIN — SODIUM CHLORIDE, POTASSIUM CHLORIDE, SODIUM LACTATE AND CALCIUM CHLORIDE: 600; 310; 30; 20 INJECTION, SOLUTION INTRAVENOUS at 03:12

## 2021-07-09 RX ADMIN — KETOROLAC TROMETHAMINE 30 MG: 30 INJECTION, SOLUTION INTRAMUSCULAR; INTRAVENOUS at 05:44

## 2021-07-09 RX ADMIN — SODIUM CITRATE AND CITRIC ACID MONOHYDRATE 30 ML: 500; 334 SOLUTION ORAL at 02:30

## 2021-07-09 RX ADMIN — SODIUM CHLORIDE, POTASSIUM CHLORIDE, SODIUM LACTATE AND CALCIUM CHLORIDE: 600; 310; 30; 20 INJECTION, SOLUTION INTRAVENOUS at 05:00

## 2021-07-09 RX ADMIN — PHENYLEPHRINE HYDROCHLORIDE 200 MCG: 10 INJECTION INTRAVENOUS at 04:18

## 2021-07-09 RX ADMIN — Medication 909 ML/HR: at 03:32

## 2021-07-09 RX ADMIN — SODIUM CHLORIDE, POTASSIUM CHLORIDE, SODIUM LACTATE AND CALCIUM CHLORIDE: 600; 310; 30; 20 INJECTION, SOLUTION INTRAVENOUS at 02:50

## 2021-07-09 RX ADMIN — DIPHENHYDRAMINE HYDROCHLORIDE 25 MG: 50 INJECTION INTRAMUSCULAR; INTRAVENOUS at 05:44

## 2021-07-09 RX ADMIN — KETOROLAC TROMETHAMINE 30 MG: 30 INJECTION, SOLUTION INTRAMUSCULAR; INTRAVENOUS at 19:17

## 2021-07-09 RX ADMIN — SODIUM CHLORIDE, POTASSIUM CHLORIDE, SODIUM LACTATE AND CALCIUM CHLORIDE 1000 ML: 600; 310; 30; 20 INJECTION, SOLUTION INTRAVENOUS at 02:00

## 2021-07-09 RX ADMIN — KETOROLAC TROMETHAMINE 30 MG: 30 INJECTION, SOLUTION INTRAMUSCULAR; INTRAVENOUS at 12:31

## 2021-07-09 RX ADMIN — MIDAZOLAM HYDROCHLORIDE 2 MG: 1 INJECTION, SOLUTION INTRAMUSCULAR; INTRAVENOUS at 03:32

## 2021-07-09 RX ADMIN — CEPHALEXIN 500 MG: 500 CAPSULE ORAL at 22:15

## 2021-07-09 RX ADMIN — DEXAMETHASONE SODIUM PHOSPHATE 10 MG: 10 INJECTION INTRAMUSCULAR; INTRAVENOUS at 03:32

## 2021-07-09 RX ADMIN — ONDANSETRON 4 MG: 2 INJECTION, SOLUTION INTRAMUSCULAR; INTRAVENOUS at 02:52

## 2021-07-09 RX ADMIN — METRONIDAZOLE 500 MG: 500 TABLET, FILM COATED ORAL at 22:15

## 2021-07-09 RX ADMIN — PHENYLEPHRINE HYDROCHLORIDE 100 MCG: 10 INJECTION INTRAVENOUS at 03:41

## 2021-07-09 RX ADMIN — PROPOFOL INJECTABLE EMULSION 20 MG: 10 INJECTION, EMULSION INTRAVENOUS at 04:12

## 2021-07-09 RX ADMIN — PHENYLEPHRINE HYDROCHLORIDE 200 MCG: 10 INJECTION INTRAVENOUS at 04:15

## 2021-07-09 RX ADMIN — PHENYLEPHRINE HYDROCHLORIDE 100 MCG: 10 INJECTION INTRAVENOUS at 03:47

## 2021-07-09 RX ADMIN — CEFAZOLIN 2000 MG: 10 INJECTION, POWDER, FOR SOLUTION INTRAVENOUS at 02:30

## 2021-07-09 RX ADMIN — SODIUM CHLORIDE: 9 INJECTION, SOLUTION INTRAVENOUS at 04:31

## 2021-07-09 RX ADMIN — PHENYLEPHRINE HYDROCHLORIDE 100 MCG: 10 INJECTION INTRAVENOUS at 03:57

## 2021-07-09 RX ADMIN — INSULIN LISPRO 1 UNITS: 100 INJECTION, SOLUTION INTRAVENOUS; SUBCUTANEOUS at 02:45

## 2021-07-09 RX ADMIN — MIDAZOLAM HYDROCHLORIDE 2 MG: 1 INJECTION, SOLUTION INTRAMUSCULAR; INTRAVENOUS at 03:33

## 2021-07-09 ASSESSMENT — PULMONARY FUNCTION TESTS
PIF_VALUE: 0
PIF_VALUE: 1
PIF_VALUE: 0
PIF_VALUE: 1
PIF_VALUE: 0

## 2021-07-09 ASSESSMENT — PAIN SCALES - GENERAL
PAINLEVEL_OUTOF10: 5
PAINLEVEL_OUTOF10: 10
PAINLEVEL_OUTOF10: 9
PAINLEVEL_OUTOF10: 5

## 2021-07-09 NOTE — CARE COORDINATION
POST-PARTUM/WIN TRANSITIONAL CARE PLAN    High-risk pregnancy in third trimester [O09.93]  38 weeks gestation of pregnancy [Z3A.38]    Writer met w/ Jose A Morales at bedside to discuss DCP. Jose A Morales is S/P CS with RRS on 7/9/2021 @ 0329 of Female infant. During C/S R broad ligament Hematoma was found    Infant name on BC: Unsure. Infant to WIN (most likely need to stay for 5 days SONY). Infant PCP Unsure. FOB: did not want to name    Writer verified name/address/phone number correct on [address] Medicaid [place] insurance correct. Writer notified patient she has 30 days from date of birth to add infant to insurance policy. Jose A Morales verbalized understanding. No previous home care . Has glucometer/supplies    Anticipate DC 7/13/2021    CM continue to follow for any DC needs.

## 2021-07-09 NOTE — CARE COORDINATION
Social Work    Per [facility], Staffing will be held Monday morning at 10:30am via zoom. Likely placement for baby with same foster parents who have other 2 children, that will be finalized at staffing Monday. Maurice Charlton at [employer] [facility] [employer] is approved to facility. Poncho Hernandez will be to hospital to pick mom up on Monday at 12 pm.    Please make sure mom is ready for dc when Chrysalis arrives. If any changes with dc plan to [facility] arise for mom please inform Aditya Espinosa [phone] as she is covering Monday.

## 2021-07-09 NOTE — PROGRESS NOTES
OB/GYN PROGRESS NOTE    Gaye Guillaume is a 26 y.o. female [id number] at 38w1d, Hospital Day: 2    Subjective:   Patient has been seen and examined. Patient is . Patient denies any vaginal discharge and any urinary complaints. The patient reports fetal movement is present, complains of contractions, denies loss of fluid, denies vaginal bleeding. Patient denies headache, vision changes, nausea, vomiting, fever, chills, shortness of breath, chest pain, RUQ pain, abdominal pain, diarrhea, change in color/amount/odor of vaginal discharge, dysuria or, hematuria.      Objective:   Vitals:  Vitals:    07/08/21 2245   BP: 135/79   Pulse: 89   Temp: 97.5 °F (36.4 °C)         FHT: 150, moderate variability, accelerations absent, decelerations late with spontaneous resolution to baseline  Contractions: regular, every 4 minutes    Physical Exam:  General appearance:  no apparent distress, alert and cooperative  HEENT: head atraumatic, normocephalic, moist mucous membranes, trachea midline  Neurologic:  alert, oriented, normal speech, no focal findings or movement disorder noted  Lungs:  No increased work of breathing, good air exchange, clear to auscultation bilaterally, no crackles or wheezing  Heart:  regular rate and rhythm and no murmur    Abdomen:  soft, gravid, non-tender, no rebound, guarding, or rigidity, no RUQ or epigastric tenderness, no signs or symptoms of abruption and no signs or symptoms of chorioamnionitis  Extremities:  no calf tenderness, non edematous  Musculoskeletal: Gross strength equal and intact throughout, no gross abnormalities, range of motion normal in hips, knees, shoulders and spine  Psychiatric: Mood appropriate, normal affect   Rectal Exam: not indicated  Pelvic Exam:  Chaperone for Intimate Exam: Chaperone was present for entire exam, Chaperone Name: Lizette Gonzalez RN  Sterile Vaginal Exam:  Cervix: No cervical motion tenderness   Uterus: Is gravid, Normal size, shape, consistency and non-tender   Adnexa: Non-tender, no palpable masses  Cervix: 1 cm dilated, 70 % effaced, -2 station     DATA:  Labs: none    Diagnostics: none    Assessment/Plan:  Domingo Muñiz is a 26 y.o. female [id number] at 38w1d IUP              - GBS unknown / Rh positive / R immune              - No indication for GBS prophylaxis              - VSS, afebrile              - cEFM/TOCO- Ca t1, uterine irritability     Contractions   - Patient continues to be in pain from contractions   - cEFM/TOCO- Cat 2 tracing   - patient continues to have no accelerations with late decelerations   - Patient is moaning in pain   - Contractions regular on TOCO   - SVE remains unchanged at 1/70/-2   - Will proceed to RLTCS with RRS 2/2 Cat 2 tracing remote from delivery   - Anesthesia and NICU aware   - Ancef and azithromycin ordered   - C/S consent signed   - Continue to monitor                  Late to/insufficient Prenatal Care              - Patients first apt was at 22 weeks              - No showed remainder of her prenatal apt              - No showed MFM apt     Hx C/S x5              - Will require repeat c/s              - Approved for RRS              - Patient was advised to get MRI at 32 weeks to evaluate for possible accreta- however she did not get this done     GDMA1              - Diagnosed off 1 hr of 205              - Saw MFM once               - Fetal echo wnl              - Has not been checking her sugars     Dilated Fetal bowel Loops              - Found on anatomy scan              - MFM recommended follow up ultrasound but patient no showed     Hx Cocaine Use              - UDS ordered              - Positive UDS for cocaine in March     Lost custody of children     Hx STI              - Trichomonas most recently in 2015              - Gonorrhea most recently in 2018              - Gc/C in May was negative     Hx PROM              - Fourth pregnancy at 37 weeks     Hx Pre E              - Denies s/s of pre E - BP normotensive today     Fm Hx CHD              - Fetal echo wnl     Hx PTD              - First pregnancy delivered at 31 weeks at [address] Use              - UDS ordered              - Cessation encouraged     Depression              - Denies SI/HI     FHx DM     BMI 21    Patient Active Problem List    Diagnosis Date Noted    Hx GDMA 2016     Priority: High    Marijuana smoker 2016     Priority: High     Overview Note:     21 - reports still smoking weed      Depression 2016     Priority: High     Overview Note:     21: Controlled on no medications, follows with [facility] 2x/wk      Hx PTD @ 31w (G1) 2015     Priority: High    FHx DM (pt's mother) 2016     Priority: Medium    GDMA1 2021     Overview Note:     Patient diagnosed 21 with failed 1hr GTT of 205.  21- MFM referral for diabetic ed placed today-SK      High-risk pregnancy in third trimester 2021     Overview Note:     Needs dating US  22w5d 3/23/21 (ORIN 2021) on BSUS performed at [facility] L&D  Unknown LMP        Hx STIs 2021     Overview Note:     Trichomonas: 2013, 1/14/15  Gonorrhea: 1/30/15, 3/14/18, 18      Hx PROM @ 37w0d (G4) 2021    Hx PreE (G1) 2021    FamHx CHD (pt's child) 2021     Overview Note:     Child (G1) with ventricular septal defect, repaired.   history of -Lost custody of children 10/22/2018    Hx of  x5 2018     Overview Note:     History of 5 prior c/s  Advise maternal/pelvic MRI (without contrast) at 32 weeks to evaluate placentation for possible accreta/increta/percreta. Pt desires RRS, medicaid consent signed 3/23/21   APPROVED OVARIAN CA RRBS FORM SCANNED INTO MEDIA UNDER OB RECORD-SK         h/o Cocaine abuse 2018       Dr. Charlee Gresham updated and in agreement with plan.      Mark Fraire,   Ob/Gyn Resident  2021, 1:23 AM

## 2021-07-09 NOTE — CARE COORDINATION
Social Work    Sw consulted due to [employer] +TOBY at delivery (+THC, and +Cocaine). Toby's back to 2016 show consistent use of thc and cociane. Sw made referral to [facility] intake Haley Sandi), record shows mom has lost custody of her 5 other children. Sw met with mom to assess needs. Mom very quickly began discussing her struggle with addiction and expressed her desire to get help and work toward being able to raise her daughter. Mom aware she is not able to care for child now. Mom discussed that 2 of her kids are in custody of mgm, 1 is in custody of mom's Auntie, and her 2 youngest ( 11, 2) are adopted and with foster parents. Mom was happy to discuss that FM keeps her updated with pictures and updates on children. Mom states FM aware she is pregnant and that FM has told her she would take the baby if needed. Mom reports fob is not involved, he in in [place]. Mom reports she has a home through [facility] and resides with her best friend Carlos Orozco. Mom denied any current s/s of anxiety or depression but did state she is upset and it is depressing that she keeps losing her babies. Mom openly discussed wanting help. Aditya discussed [facility] with mom and mom was very agreeable. Sarah contacted ([phone]) and Bora Boogie asked for mom to call back around 1pm to complete phone assessment. Aditya will meet with mom to help complete phone assessment. Sw praised mom for making hard choices that are positive for her. Mom states she has been linked with [facility] for AOD tx in the past, but appeared confused as to why she just stops going. Aditya actively listened, provided compassion, and encouraged mom to be mindful and in current situation, not dwell on past.    Aditya will update medical record after Sarah is contacted. No Dc for baby until [facility] relays plan (Monday).

## 2021-07-09 NOTE — CARE COORDINATION
Social Work    Sw met with mom and helped her complete phone intake with [facility]). Mom's information will get reviewed by facility nurse then Sarah will call Aditya and set up details for Monday. [facility] Cw is [employer] [place]. He will see mom today. Cw reports mom did have custody of her youngest child until February when she contacted [facility] herself due to going through a [address] crisis and knowing she needed help with child. Cw aware that Karthik Mo is covering Monday and he has her phone number for coordination. No dc for baby until [facility] relays plan.

## 2021-07-09 NOTE — PROGRESS NOTES
OB/GYN Resident Interval Note    Patient's glucose noted to be 141. Will treat with Humalog 1 U now.      Dr Verner Rotunda updated and in agreement        Christopher Potter DO   OB/GYN Resident  Pager [phone]  [facility]  7/9/2021, 2:39 AM

## 2021-07-09 NOTE — ANESTHESIA PRE PROCEDURE
Department of Anesthesiology  Preprocedure Note       Name:  Asmita Garibay   Age:  26 y.o.  :  1990                                          MRN:  [id number]         Date:  2021      Surgeon: Katja Carpenter):  Cece Bardales MD    Procedure: Procedure(s):   SECTION    Medications prior to admission:   Prior to Admission medications    Medication Sig Start Date End Date Taking?  Authorizing Provider   Prenatal Vit-Fe Fumarate-FA (PRENATAL VITAMIN) 27-0.8 MG TABS Take 1 tablet by mouth daily 3/23/21  Yes Nazario Ross, DO   aspirin EC 81 MG EC tablet Take 1 tablet by mouth daily 3/23/21  Yes Nazario Ross, DO   loperamide (IMODIUM) 2 MG capsule Take 1 capsule by mouth 4 times daily as needed for Diarrhea  Patient not taking: Reported on 2021 3/23/21   Nazario Ross, DO   ondansetron [facility]) 4 MG tablet Take 1 tablet by mouth every 8 hours as needed for Nausea or Vomiting 3/23/21   Nazario Ross, DO   ondansetron (ZOFRAN ODT) 4 MG disintegrating tablet Take 1 tablet by mouth every 8 hours as needed for Nausea or Vomiting  Patient not taking: Reported on 2021   Briana Oh,    acetaminophen (TYLENOL) 325 MG tablet Take 2 tablets by mouth every 6 hours as needed for Pain  Patient not taking: Reported on 2021   Nanda Bess DO       Current medications:    Current Facility-Administered Medications   Medication Dose Route Frequency Provider Last Rate Last Admin    lactated ringers bolus  1,000 mL Intravenous Once Sims End, DO        sodium chloride flush 0.9 % injection 10 mL  10 mL Intravenous 2 times per day Sims End, DO        sodium chloride flush 0.9 % injection 10 mL  10 mL Intravenous PRN Sims End, DO        0.9 % sodium chloride infusion  25 mL Intravenous PRN Sims End, DO        citric acid-sodium citrate (BICITRA) solution 30 mL  30 mL Oral Once Sims End, DO        ondansetron (ZOFRAN) injection 4 mg  4 mg Intravenous Q6H PRN Paulo Falcon °F (36.4 °C)                                              BP Readings from Last 3 Encounters:   07/08/21 135/79   05/27/21 118/75   05/04/21 126/81       NPO Status:  9:30 full meal                                                                               BMI:   Wt Readings from Last 3 Encounters:   05/27/21 138 lb (62.6 kg)   05/04/21 132 lb (59.9 kg)   01/25/21 119 lb 2 oz (54 kg)     There is no height or weight on file to calculate BMI.    CBC:   Lab Results   Component Value Date    WBC 7.9 05/04/2021    RBC 3.65 05/04/2021    HGB 11.6 05/04/2021    HCT 35.7 05/04/2021    MCV 97.8 05/04/2021    RDW 13.2 05/04/2021     05/04/2021       CMP:   Lab Results   Component Value Date     03/23/2021    K 3.8 03/23/2021     03/23/2021    CO2 21 03/23/2021    BUN 7 03/23/2021    CREATININE 0.40 03/23/2021    GFRAA >60 03/23/2021    LABGLOM >60 03/23/2021    GLUCOSE 205 05/04/2021    GLUCOSE 76 03/23/2021    PROT 6.5 03/23/2021    CALCIUM 8.6 03/23/2021    BILITOT 0.21 03/23/2021    ALKPHOS 82 03/23/2021    AST 16 03/23/2021    ALT 14 03/23/2021       POC Tests: No results for input(s): POCGLU, POCNA, POCK, POCCL, POCBUN, POCHEMO, POCHCT in the last 72 hours.     Coags: No results found for: PROTIME, INR, APTT    HCG (If Applicable):   Lab Results   Component Value Date    PREGTESTUR positive 01/25/2021    HCG POSITIVE (A) 05/17/2015    HCGQUANT 517 (H) 10/22/2019        ABGs: No results found for: PHART, PO2ART, ATL5FCN, LMA0QXY, BEART, G6MDRVEM     Type & Screen (If Applicable):  No results found for: LABABO, LABRH    Drug/Infectious Status (If Applicable):  Lab Results   Component Value Date    HEPCAB NONREACTIVE 03/23/2021       COVID-19 Screening (If Applicable): No results found for: COVID19        Anesthesia Evaluation    Airway: Mallampati: II     Neck ROM: full   Dental:          Pulmonary:                              Cardiovascular:  Exercise tolerance: good (>4 METS), Neuro/Psych:   (+) seizures:, depression/anxiety              ROS comment: cocaine GI/Hepatic/Renal:        (-) hiatal hernia       Endo/Other:                      ROS comment: GDMA  CS X 5 Abdominal:             Vascular: Other Findings:             Anesthesia Plan      spinal     ASA 3       Induction: intravenous.                           Luica Rush MD   7/9/2021

## 2021-07-09 NOTE — DISCHARGE SUMMARY
Obstetric Discharge Summary  [facility]    Patient Name: Domingo Muñiz  Patient : 1990  Primary Care Physician: Lupe Noble MD  Admit Date: 2021    Principal Diagnosis: IUP at 38w1d, admitted for RLTCS 2/2 Cat 2 tracing remote from delivery     Her pregnancy has been complicated by:   Patient Active Problem List   Diagnosis    Hx PTD @ 31w (G1)    FHx DM (pt's mother)    Hx GDMA    Marijuana smoker    Depression    Hx of  x5     h/o Cocaine abuse     history of -Lost custody of children    High-risk pregnancy in third trimester    Hx STIs    Hx PROM @ 37w0d (G4)    Hx PreE (G1)    FamHx CHD (pt's child)    GDMA1    RLTCS w/ RRS 21 F APG 8/ Wt 6#3    38 weeks gestation of pregnancy    Gestational hypertension       Infection Present?: No  Hospital Acquired: N/A    Surgical Operations & Procedures:  Analgesia: spinal  Delivery Type:  Delivery: : Low Transverse    Consultations: NICU and Anesthesia    Pertinent Findings & Procedures:   Domingo Muñiz is a 26 y.o. female [id number] at 38w1d admitted for RLTCS 2/2 Cat 2 tracing remote from delivery3; received ancef and azithromycin preoperatively. She delivered by repeat low transverse  with RRS a Live Born infant on 21. Case was complicated by R broad ligament hematoma that extended to the cervix. An [place] stitch was needed to control bleeding. Information for the patient's :  Alyssia Kell[place] Girl Jenifer Laming [[id number]]   female   Birth Weight: 6 lb 3.3 oz (2.815 kg)     Apgars: 8 at 1 minute and 9 at 5 minutes. Postpartum course: normal.      POD#0: Hgb dropped from 12 to 9.6 intraoperatively. QBL was 930mL. Hgb 9.4 postoperatively. POD#1:  Hgb dropped to 6.8 and 1U PRBC was transfused. Repeat Hgb 8.1. Thrombocytopenia, platelets dropped from 168 to 143 to 141. Patient met criteria for gHTN, preE labs completed and were within normal limits.  Plts pt had Left hip replacement  on Oct 25th , today pt noticed bleeding from her surgical site , no pus noted , pt able to ambulate increased to 168 which was her baseline on admission. Hgb remained stable at 8.1. P/C was ordered but unable to be resulted prior discharge due to lab machine malfunction. Course of patient: uncomplicated    Discharge to: Sarah on 7/12/21 @ 1200    Readmission planned: no     Recommendations on Discharge:     Medications:      Medication List      START taking these medications    cephALEXin 500 MG capsule  Commonly known as: KEFLEX  Take 1 capsule by mouth 4 times daily for 5 days     docusate sodium 100 MG capsule  Commonly known as: COLACE  Take 1 capsule by mouth 2 times daily     ferrous sulfate 325 (65 Fe) MG tablet  Commonly known as: IRON 325  Take 1 tablet by mouth 2 times daily     ibuprofen 800 MG tablet  Commonly known as: ADVIL;MOTRIN  Take 1 tablet by mouth every 8 hours as needed for Pain or Fever     oxyCODONE-acetaminophen 5-325 MG per tablet  Commonly known as: PERCOCET  Take 1 tablet by mouth every 6 hours as needed for Pain for up to 5 days.         CONTINUE taking these medications    acetaminophen 325 MG tablet  Commonly known as: Tylenol  Take 2 tablets by mouth every 6 hours as needed for Pain     loperamide 2 MG capsule  Commonly known as: IMODIUM  Take 1 capsule by mouth 4 times daily as needed for Diarrhea     ondansetron 4 MG disintegrating tablet  Commonly known as: Zofran ODT  Take 1 tablet by mouth every 8 hours as needed for Nausea or Vomiting     ondansetron 4 MG tablet  Commonly known as: Zofran  Take 1 tablet by mouth every 8 hours as needed for Nausea or Vomiting     Prenatal Vitamin 27-0.8 MG Tabs  Take 1 tablet by mouth daily        STOP taking these medications    aspirin EC 81 MG EC tablet           Where to Get Your Medications      You can get these medications from any pharmacy    Bring a paper prescription for each of these medications  · cephALEXin 500 MG capsule  · docusate sodium 100 MG capsule  · ferrous sulfate 325 (65 Fe) MG tablet  · ibuprofen 800 MG tablet  · oxyCODONE-acetaminophen 5-325 MG per tablet         Activity: pelvic rest x 6 weeks, no driving on narcotics, no lifting greater than 15 lbs  Diet: regular diet  Follow up: 1 week for blood pressure check and needs 2 hr GTT at 6 week PP appointment     Condition on discharge: stable    Discharge date: 07/12/21    Lani Jha DO  Ob/Gyn Resident    Comments:  Home care and follow-up care were reviewed. Pelvic rest, and birth control were reviewed. Signs and symptoms of mastitis and post partum depression were reviewed. The patient is to notify her physician if any of these occur. The patient was counseled on secondary smoke risks and the increased risk of sudden infant death syndrome and respiratory problems to her baby with exposure. She was counseled on various alternate recommendations to decrease the exposure to secondary smoke to her children.

## 2021-07-09 NOTE — ANESTHESIA PROCEDURE NOTES
Spinal Block    Patient location during procedure: OR  Reason for block: primary anesthetic and at surgeon's request  Staffing  Performed: resident/CRNA   Resident/CRNA: ROSARIO Carlson CRNA  Preanesthetic Checklist  Completed: patient identified, IV checked, site marked, risks and benefits discussed, surgical consent, monitors and equipment checked, pre-op evaluation, timeout performed, anesthesia consent given, oxygen available and patient being monitored  Spinal Block  Patient position: sitting  Prep: Betadine  Patient monitoring: frequent blood pressure checks and continuous pulse ox  Approach: midline  Location: L3/L4  Provider prep: sterile gloves and mask  Local infiltration: lidocaine  Dose: 0.2  Agent: bupivacaine  Adjuvant: duramorph  Dose: 2  Dose: 2  Needle  Needle type: Pencan   Needle gauge: 24 G  Needle length: 4 in  Assessment  Sensory level: T4  Events: cerebrospinal fluid  Swirl obtained: Yes  CSF: clear  Attempts: 1  Hemodynamics: stable

## 2021-07-09 NOTE — H&P
OBSTETRICAL HISTORY [facility]    Date: 2021       Time: 11:16 PM   Patient Name: Adelita Wall     Patient : 1990  Room/Bed: [place] [address]    Admission Date/Time: 2021 10:27 PM      CC: Contractions     HPI: Adelita Wall is a 26 y.o. [id number] at 38w0d who presents from home with contractions. She states that they started this morning and she feels them about every 30 minutes. She denies any urinary complaints. The patient reports fetal movement is present, complains of contractions, denies loss of fluid, denies vaginal bleeding. DATING:  LMP: Patient's last menstrual period was 10/10/2020.   Estimated Date of Delivery: 21   Based on: late utrasound, at 25 5/7 weeks GA    PREGNANCY RISK FACTORS:  Patient Active Problem List   Diagnosis    Hx PTD @ 31w (G1)    FHx DM (pt's mother)    Hx GDMA    Marijuana smoker    Depression    Hx of  x5     h/o Cocaine abuse     history of -Lost custody of children    High-risk pregnancy in third trimester    Hx STIs    Hx PROM @ 37w0d (G4)    Hx PreE (G1)    FamHx CHD (pt's child)    28 weeks gestation of pregnancy    GDMA1        Steroids Given In This Pregnancy:  no     REVIEW OF SYSTEMS:   Constitutional: negative fever, negative chills, negative weight changes   HEENT: negative visual disturbances, negative headaches, negative dizziness, negative hearing loss  Breast: Negative breast abnormalities, negative breast lumps, negative nipple discharge  Respiratory: negative dyspnea, negative cough, negative SOB  Cardiovascular: negative chest pain,  negative palpitations, negative arrhythmia, negative syncope   Gastrointestinal: positive abdominal pain, negative RUQ pain, negative N/V, negative diarrhea, negative constipation, negative bowel changes, negative heartburn   Genitourinary: negative dysuria, negative hematuria, negative urinary incontinence, negative vaginal discharge, negative vaginal bleeding or spotting  Dermatological: negative rash, negative pruritis, negative mole or other skin changes  Hematologic: negative bruising  Immunologic/Lymphatic: negative recent illness, negative recent sick contact  Musculoskeletal: negative back pain, negative myalgias, negative arthralgias  Neurological:  negative dizziness, negative migraines, negative seizures, negative weakness  Behavior/Psych: negative depression, negative anxiety, negative SI, negative HI    OBSTETRICAL HISTORY:   OB History    Para Term  AB Living   9 5 4 1 3 5   SAB TAB Ectopic Molar Multiple Live Births   2 1 0 0 0 5      # Outcome Date GA Lbr Lorenzo/2nd Weight Sex Delivery Anes PTL Lv   9 Current            8 Term 18 37w1d  6 lb 15.8 oz (3.17 kg) M CS-LTranv Spinal N [place]      Name: Yoana King: 8  Apgar5: 9   7 Term 10/14/16 37w3d  5 lb 11.7 oz (2.6 kg) M CS-LTranv   [place]      Name: Yoana King: 8  Apgar5: 9   6 SAB 10/20/15           5 SAB 2015           4 Term 14 37w0d  6 lb 9 oz (2.977 kg) M CS-LTranv  Y [place]      Complications: PROM (premature rupture of membranes)   3 Term 09 38w0d  5 lb 9 oz (2.523 kg) F CS-LTranv   [place]   2 TAB 06 5w0d    TAB      1  10/20/05 31w0d  2 lb 7 oz (1.106 kg) F CS-LTranv  N [place]      Complications: Toxemia in pregnancy       PAST MEDICAL HISTORY:   has a past medical history of Depression, ESBL (extended spectrum beta-lactamase) producing bacteria infection, GDMA1, Marijuana smoker, and Postpartum depression. PAST SURGICAL HISTORY:   has a past surgical history that includes  section; Cholecystectomy; Cholecystectomy, laparoscopic; and pr  delivery only (N/A, 2018). ALLERGIES:  is allergic to magnesium-containing compounds and keflex [cephalexin]. MEDICATIONS:  Prior to Admission medications    Medication Sig Start Date End Date Taking?  Authorizing Provider   Prenatal Vit-Fe Infantium (PRENATAL VITAMIN) 27-0.8 MG TABS Take 1 tablet by mouth daily 3/23/21  Yes Nazario Ross, DO   aspirin EC 81 MG EC tablet Take 1 tablet by mouth daily 3/23/21  Yes Nazario Ross, DO   loperamide (IMODIUM) 2 MG capsule Take 1 capsule by mouth 4 times daily as needed for Diarrhea  Patient not taking: Reported on 5/4/2021 3/23/21   Nazario Ross, DO   ondansetron [facility]) 4 MG tablet Take 1 tablet by mouth every 8 hours as needed for Nausea or Vomiting 3/23/21   Nazario Benjaminbing, DO   ondansetron (ZOFRAN ODT) 4 MG disintegrating tablet Take 1 tablet by mouth every 8 hours as needed for Nausea or Vomiting  Patient not taking: Reported on 5/4/2021 2/20/21   Briana Oh DO   acetaminophen (TYLENOL) 325 MG tablet Take 2 tablets by mouth every 6 hours as needed for Pain  Patient not taking: Reported on 5/4/2021 8/2/18   Nanda Bess DO       FAMILY HISTORY:  family history includes Asthma in her mother; Depression in her mother; Diabetes in her maternal aunt and mother; High Blood Pressure in her maternal aunt and mother; Stroke in her mother. SOCIAL HISTORY:   reports that she has been smoking cigarettes. She has a 1.25 pack-year smoking history. She has never used smokeless tobacco. She reports current drug use. Drugs: Marijuana and Cocaine. She reports that she does not drink alcohol.     VITALS:  Vitals:    07/08/21 2245   BP: 135/79   Pulse: 89   Temp: 97.5 °F (36.4 °C)         PHYSICAL EXAM:  Fetal Heart Monitor:  Baseline Heart Rate 150, moderate variability, absent accelerations, absent decelerations  [place]: contractions, irritability    General appearance:  no apparent distress, alert and cooperative  HEENT: head atraumatic, normocephalic, moist mucous membranes, trachea midline  Neurologic:  alert, oriented, normal speech, no focal findings or movement disorder noted  Lungs:  No increased work of breathing, good air exchange, clear to auscultation bilaterally, no crackles or wheezing  Heart:  regular contractions started this morning   - States that they are occurring every 30 minutes   - Patient appears uncomfortable   - Abdomen is soft   - Denies leaking fluid or vaginal bleeding   - Positive fetal movement   - SVE 1/70/-3   - Will recheck in two hours   - cEFM/TOCO- Cat 1 tracing, irritability on TOCO   - IVF bolus ordered   - Tylenol given   - NPO   - Continue to monitor    Late to/insufficient Prenatal Care   - Patients first apt was at 22 weeks   - No showed remainder of her prenatal apt   - No showed MFM apt    Hx C/S x5   - Will require repeat c/s   - Approved for RRS   - Patient was advised to get MRI at 32 weeks to evaluate for possible accreta- however she did not get this done    GDMA1   - Diagnosed off 1 hr of 205   - Saw MFM once    - Fetal echo wnl   - Has not been checking her sugars    Dilated Fetal bowel Loops   - Found on anatomy scan   - MFM recommended follow up ultrasound but patient no showed    Hx Cocaine Use   - UDS ordered   - Positive UDS for cocaine in March    Lost custody of children    Hx STI   - Trichomonas most recently in 2015   - Gonorrhea most recently in 2018   - Gc/C in May was negative    Hx PROM   - Fourth pregnancy at 37 weeks    Hx Pre E   - Denies s/s of pre E   - BP normotensive today    Fm Hx CHD   - Fetal echo wnl    Hx PTD   - First pregnancy delivered at 31 weeks at [facility]    Hx GDMA    [facility] Use   - UDS ordered   - Cessation encouraged    Depression   - Denies SI/HI    FHx DM    BMI 21        Patient Active Problem List    Diagnosis Date Noted    Hx GDMA 03/31/2016     Priority: High    Marijuana smoker 03/31/2016     Priority: High     5/4/21 - reports still smoking weed      Depression 03/31/2016     Priority: High     5/4/21: Controlled on no medications, follows with [facility] 2x/wk      Hx PTD @ 31w (G1) 04/06/2015     Priority: High    FHx DM (pt's mother) 03/31/2016     Priority: Medium    GDMA1 05/04/2021     Patient diagnosed 5/4/21 with failed 1hr GTT of 205.  21- MFM referral for diabetic ed placed today-SK      High-risk pregnancy in third trimester 2021     Needs dating US  22w5d 3/23/21 (ORIN 2021) on BSUS performed at [facility] L&D  Unknown LMP        Hx STIs 2021     Trichomonas: 2013, 1/14/15  Gonorrhea: 1/30/15, 3/14/18, 18      Hx PROM @ 37w0d (G4) 2021    Hx PreE (G1) 2021    FamHx CHD (pt's child) 2021     Child (G1) with ventricular septal defect, repaired. 28 weeks gestation of pregnancy      history of -Lost custody of children 10/22/2018    Hx of  x5 2018     History of 5 prior c/s  Advise maternal/pelvic MRI (without contrast) at 32 weeks to evaluate placentation for possible accreta/increta/percreta. Pt desires RRS, medicaid consent signed 3/23/21   APPROVED OVARIAN CA RRBS FORM SCANNED INTO MEDIA UNDER OB RECORD-SK         h/o Cocaine abuse 2018       Plan discussed with Dr. Stephania Michael, who is agreeable. Steroids given this admission: No    Risks, benefits, alternatives and possible complications have been discussed in detail with the patient. Admission, and post admission procedures and expectations were discussed in detail. All questions were answered. Attending's Name: Dr. Pastor Barragan DO  Ob/Gyn Resident  2021, 11:16 PM        Attending Physician Statement  I have discussed the care of Mary Dominguez, including pertinent history and exam findings,  with the resident. I have reviewed the key elements of all parts of the encounter with the resident. I agree with the assessment, plan and orders as documented by the resident.   (GE Modifier)    Electronically signed by Sharee Bar MD at 3:01 AM 21

## 2021-07-09 NOTE — ANESTHESIA POSTPROCEDURE EVALUATION
Department of Anesthesiology  Postprocedure Note    Patient: Sammy Cleary  MRN: [id number]  YOB: 1990  Date of evaluation: 2021  Time:  6:19 AM     Procedure Summary     Date: 21 Room / Location: [address]    Anesthesia Start: [facility] Anesthesia Stop: 7587    Procedure:  SECTION (N/A ) Diagnosis: (cat 2)    Surgeons: Chrissie Mcclendon MD Responsible Provider: Kale Mo MD    Anesthesia Type: spinal ASA Status: 3          Anesthesia Type: spinal    Meghan Phase I:      Meghan Phase II:      Last vitals: Reviewed and per EMR flowsheets. Anesthesia Post Evaluation    Patient location during evaluation: PACU  Patient participation: complete - patient participated  Level of consciousness: agitated  Pain score: 10  Nausea & Vomiting: no vomiting  Cardiovascular status: hemodynamically stable  Respiratory status: room air  Comments: Pt agitated,drug screen pos for cocaine. ,

## 2021-07-09 NOTE — OP NOTE
Operative Note  Department of Obstetrics and Gynecology  [address] [facility]     Patient: Linda Damico   : 1990  MRN: [id number]       Acct: [de-identified]   PCP: Eunice Campbell MD  Date of Procedure: 21    Pre-operative Diagnosis: 26 y.o. female [id number] at 38w1d IUP   Cat 2 Tracing Remote from Delivery   Contractions    Abdominal Pain    Diarrhea   Hx C/S x5   GDMA1   THC Use &  Cocaine Use     Post-operative Diagnosis: Same and live born living female, and right broad ligament hematoma extending to cervix    Procedure: repeat low transverse  section with b/l risk reducing salpingectomy    Indications: Patient is a 26year old [id number] at 45 w1d with medical history of  section x5, cocaine use, GDMA1 who came in with contractions and abdominal pain. Patient had a category two tracing upon arrival with no accelerations and late decelerations. Patient's urine drug screen was positive for cocaine and decision was made to proceed with repeat  section secondary to category 2 tracing remote from delivery. Patient had previously been consented for a risk reducing salpingectomy and it had previously been approved by ethics. Consent was signed and she received ancef and azithromycin pre operatively. Surgeon: Dr. Charmaine Ott  Assistant(s): Cliff Snyder,  [facility], PGY [facility] PGY 1    Anesthesia: spinal with duramorph    Procedure Details   The patient was seen pre-operatively. The risks, benefits, complications, treatment options, and expected outcomes were discussed with the patient. The patient concurred with the proposed plan, giving informed consent. The patient was taken to the Operating Room, identified as Linda Damico and the procedure verified as  Delivery. A Time Out was held and the above information confirmed. After spinal anesthesia, the patient was draped and prepped in the usual sterile manner.  A Pfannenstiel incision was made and carried down through the subcutaneous tissue to the fascia using scalpel. Fascial incision was made and extended transversely using roy scissors and bovie for sharp dissection. The fascia was very thin and severely adhered to the subcutaneous fat. The fascia was carefully  from the underlying rectus tissue superiorly and inferiorly using bovie electrocautery and roy scissors. The peritoneum was identified and entered bluntly. Peritoneal incision was extended longitudinally with blunt stretch and bovie was used to extend sharply. The bladder retractor was placed. A low transverse uterine incision was made using a new scalpel blade. Blunt stretch on the hysterotomy incision was made and the amniotomy was performed revealing thick meconium stained fluid. Delivered from cephalic presentation was a Live Born female infant. The infant was suctioned, dried and the umbilical cord was clamped and cut after one minute delayed cord clamping. The infant was taken to the warmer and attended by NICU for evaluation. A second section of cord was clamped and cut and sent for gases. Cord blood was obtained for evaluation. The placenta was removed spontaneously with gentle traction and appeared intact, whole, with a marginal cord insertion and that the umbilical cord had three vessels noted. Pitocin was started. The uterine outline appeared normal. The uterus was exteriorized and cleaned of all clots and debris. The right uterine incision was noted to have extended into the right uterine artery. Artery was clamped with a Sola to control bleeding. The uterine incision was quickly closed with running sutures of 0-Monocryl. [place] stitch was thrown on the right uterine artery to maintain hemostasis. Hysterotomy was packed with several laps to apply pressure to this area. The posterior culdesac was examined to clear out all clots and debris.  A right sided broad ligament hematoma was noted and found to extend to the cervix. Approximately 300 mL of blood was contained inside. The hematoma did not appear to be expanding so pressure was held as the risk reducing salpingectomy was performed. [place]'s were used to grasp the right fallopian tube and the ligasure device was used to remove the fallopian tube from the broad ligament. At the junction of the fallopian tube and uterus the ligasure was used to remove the tube from the uterus. The same procedure was then repeated on the patient's left side. The hematoma was then reevaluated and was not growing in size. Hemostasis was noted. The uterus was reintroduced into the abdominal cavity. Bilateral abdominal gutters were cleared of all clots and debris. Bilateral  ovaries were visualized and appeared normal. The hysterotomy was again inspected and found to be hemostatic. Rectus muscles were inspected and found to be hemostatic. The fascia was then reapproximated with running sutures of 0 Vicryl. At this time anesthesia noted that patients blood pressures were on the low side. CBC was sent and Hgb came back at 9.6 down from 12 on admission. The subcuticular space was irrigated copiously. The skin was reapproximated with a 4-0 Monocryl. The skin was then cleansed and dressed with a bandage in sterile fashion. Instrument, sponge, and needle counts were correct prior the abdominal closure and at the conclusion of the case. The urine remained clear throughout the case. Ancef and azithromycin was given for antibiotic prophylaxis. SCDs for DVT prophylaxis remain in place for the post operative period. Of note, patient was having active diarrhea during the surgery. There was even fecal particles noted to be inside her vagina at the end of the case. Decision made to give patient keflex and flagyl for 48 hours to hopefully prevent any endometritis or wound infection. Dr. Liza Newman was present for the entire operation.     Findings:  Live Born 6 lb 3 oz female infant in cephalic presentation with Apgars of 8 at 1 minute and 9 at five minutes, normal appearing uterus tubes and ovaries   Quantitative Blood Loss: 855 immediately post-operatively  Total IV Fluids: 2 L  Urine output: 150 clear urine   Drains:  alarcon catheter  Specimens:  placenta sent to pathology, cord blood and cord gases  Instrument and Sponge Count: Correct  Complications: Broad ligament hematoma extending into the cervix, O'le[place] stitch needed to control bleeding on right uterine artery  Condition: Infant stable, transfer to [facility], Mother stable, transfer to post anesthesia recovery      Selin Hoyt DO  OB/GYN Resident  7/9/2021, 4:38 AM    This dictation was performed by a resident physician and then was thoroughly reviewed by the attending prior to being signed. Senior Residents Attestation Statement  I was present with the resident physician during the history and exam. I discussed the findings and plans with the resident physician and agree as documented in her note     Spencer Ram DO   OB/GYN Resident  Pager [phone] [facility]  7/9/2021, 9:04 AM    Date: 7/9/2021  Time: 3:51 PM    Attending Attestation:   I was present and scrubbed for the entire procedure.     Attending Name: Alexandra Arnold M.D.

## 2021-07-09 NOTE — FLOWSHEET NOTE
Patient to triage 2 per squad with c/o ctx that started but have progressively gotten worse. No bleeding per patient but did have small amount of leakage prior to transport. + fetal movement per patient.  Will notify residents of patients arrival.

## 2021-07-10 LAB
ABSOLUTE EOS #: 0 K/UL (ref 0–0.44)
ABSOLUTE EOS #: 0.09 K/UL (ref 0–0.44)
ABSOLUTE IMMATURE GRANULOCYTE: 0.19 K/UL (ref 0–0.3)
ABSOLUTE IMMATURE GRANULOCYTE: 0.3 K/UL (ref 0–0.3)
ABSOLUTE LYMPH #: 3.45 K/UL (ref 1.1–3.7)
ABSOLUTE LYMPH #: 3.69 K/UL (ref 1.1–3.7)
ABSOLUTE MONO #: 0.75 K/UL (ref 0.1–1.2)
ABSOLUTE MONO #: 0.78 K/UL (ref 0.1–1.2)
BASOPHILS # BLD: 0 % (ref 0–2)
BASOPHILS # BLD: 0 % (ref 0–2)
BASOPHILS ABSOLUTE: 0 K/UL (ref 0–0.2)
BASOPHILS ABSOLUTE: 0.04 K/UL (ref 0–0.2)
CULTURE: ABNORMAL
DIFFERENTIAL TYPE: ABNORMAL
DIFFERENTIAL TYPE: ABNORMAL
EOSINOPHILS RELATIVE PERCENT: 0 % (ref 1–4)
EOSINOPHILS RELATIVE PERCENT: 1 % (ref 1–4)
HCT VFR BLD CALC: 21.4 % (ref 36.3–47.1)
HCT VFR BLD CALC: 26.3 % (ref 36.3–47.1)
HEMOGLOBIN: 6.8 G/DL (ref 11.9–15.1)
HEMOGLOBIN: 8.1 G/DL (ref 11.9–15.1)
IMMATURE GRANULOCYTES: 1 %
IMMATURE GRANULOCYTES: 2 %
LYMPHOCYTES # BLD: 19 % (ref 24–43)
LYMPHOCYTES # BLD: 22 % (ref 24–43)
Lab: ABNORMAL
MCH RBC QN AUTO: 30.8 PG (ref 25.2–33.5)
MCH RBC QN AUTO: 31.1 PG (ref 25.2–33.5)
MCHC RBC AUTO-ENTMCNC: 30.8 G/DL (ref 28.4–34.8)
MCHC RBC AUTO-ENTMCNC: 31.8 G/DL (ref 28.4–34.8)
MCV RBC AUTO: 100 FL (ref 82.6–102.9)
MCV RBC AUTO: 97.7 FL (ref 82.6–102.9)
MONOCYTES # BLD: 4 % (ref 3–12)
MONOCYTES # BLD: 5 % (ref 3–12)
MORPHOLOGY: ABNORMAL
NRBC AUTOMATED: 0.1 PER 100 WBC
NRBC AUTOMATED: 0.1 PER 100 WBC
PDW BLD-RTO: 14.3 % (ref 11.8–14.4)
PDW BLD-RTO: 14.6 % (ref 11.8–14.4)
PLATELET # BLD: 143 K/UL (ref 138–453)
PLATELET # BLD: ABNORMAL K/UL (ref 138–453)
PLATELET ESTIMATE: ABNORMAL
PLATELET ESTIMATE: ABNORMAL
PLATELET, FLUORESCENCE: 141 K/UL (ref 138–453)
PLATELET, IMMATURE FRACTION: 7.1 % (ref 1.1–10.3)
PMV BLD AUTO: 11.8 FL (ref 8.1–13.5)
PMV BLD AUTO: ABNORMAL FL (ref 8.1–13.5)
RBC # BLD: 2.19 M/UL (ref 3.95–5.11)
RBC # BLD: 2.63 M/UL (ref 3.95–5.11)
RBC # BLD: ABNORMAL 10*6/UL
RBC # BLD: ABNORMAL 10*6/UL
SEG NEUTROPHILS: 70 % (ref 36–65)
SEG NEUTROPHILS: 76 % (ref 36–65)
SEGMENTED NEUTROPHILS ABSOLUTE COUNT: 10.92 K/UL (ref 1.5–8.1)
SEGMENTED NEUTROPHILS ABSOLUTE COUNT: 14.74 K/UL (ref 1.5–8.1)
SPECIMEN DESCRIPTION: ABNORMAL
WBC # BLD: 15.6 K/UL (ref 3.5–11.3)
WBC # BLD: 19.4 K/UL (ref 3.5–11.3)
WBC # BLD: ABNORMAL 10*3/UL
WBC # BLD: ABNORMAL 10*3/UL

## 2021-07-10 PROCEDURE — 85025 COMPLETE CBC W/AUTO DIFF WBC: CPT

## 2021-07-10 PROCEDURE — P9016 RBC LEUKOCYTES REDUCED: HCPCS

## 2021-07-10 PROCEDURE — 1220000000 HC SEMI PRIVATE OB R&B

## 2021-07-10 PROCEDURE — 86900 BLOOD TYPING SEROLOGIC ABO: CPT

## 2021-07-10 PROCEDURE — 36415 COLL VENOUS BLD VENIPUNCTURE: CPT

## 2021-07-10 PROCEDURE — 6370000000 HC RX 637 (ALT 250 FOR IP): Performed by: STUDENT IN AN ORGANIZED HEALTH CARE EDUCATION/TRAINING PROGRAM

## 2021-07-10 PROCEDURE — 85055 RETICULATED PLATELET ASSAY: CPT

## 2021-07-10 PROCEDURE — 36430 TRANSFUSION BLD/BLD COMPNT: CPT

## 2021-07-10 PROCEDURE — 2580000003 HC RX 258: Performed by: STUDENT IN AN ORGANIZED HEALTH CARE EDUCATION/TRAINING PROGRAM

## 2021-07-10 RX ORDER — SODIUM CHLORIDE 9 MG/ML
INJECTION, SOLUTION INTRAVENOUS PRN
Status: COMPLETED | OUTPATIENT
Start: 2021-07-10 | End: 2021-07-10

## 2021-07-10 RX ORDER — CEPHALEXIN 500 MG/1
500 CAPSULE ORAL 4 TIMES DAILY
Status: DISCONTINUED | OUTPATIENT
Start: 2021-07-10 | End: 2021-07-12 | Stop reason: HOSPADM

## 2021-07-10 RX ADMIN — IBUPROFEN 800 MG: 800 TABLET, FILM COATED ORAL at 00:25

## 2021-07-10 RX ADMIN — CEPHALEXIN 500 MG: 500 CAPSULE ORAL at 08:48

## 2021-07-10 RX ADMIN — OXYCODONE HYDROCHLORIDE AND ACETAMINOPHEN 2 TABLET: 5; 325 TABLET ORAL at 11:14

## 2021-07-10 RX ADMIN — SODIUM CHLORIDE: 9 INJECTION, SOLUTION INTRAVENOUS at 09:13

## 2021-07-10 RX ADMIN — OXYCODONE HYDROCHLORIDE AND ACETAMINOPHEN 2 TABLET: 5; 325 TABLET ORAL at 20:50

## 2021-07-10 RX ADMIN — OXYCODONE HYDROCHLORIDE AND ACETAMINOPHEN 2 TABLET: 5; 325 TABLET ORAL at 06:30

## 2021-07-10 RX ADMIN — METRONIDAZOLE 500 MG: 500 TABLET, FILM COATED ORAL at 06:30

## 2021-07-10 RX ADMIN — CEPHALEXIN 500 MG: 500 CAPSULE ORAL at 13:58

## 2021-07-10 RX ADMIN — Medication 1 TABLET: at 08:48

## 2021-07-10 RX ADMIN — CEPHALEXIN 500 MG: 500 CAPSULE ORAL at 16:48

## 2021-07-10 RX ADMIN — METRONIDAZOLE 500 MG: 500 TABLET, FILM COATED ORAL at 13:58

## 2021-07-10 RX ADMIN — OXYCODONE HYDROCHLORIDE AND ACETAMINOPHEN 2 TABLET: 5; 325 TABLET ORAL at 02:43

## 2021-07-10 RX ADMIN — IBUPROFEN 800 MG: 800 TABLET, FILM COATED ORAL at 16:48

## 2021-07-10 RX ADMIN — OXYCODONE HYDROCHLORIDE AND ACETAMINOPHEN 2 TABLET: 5; 325 TABLET ORAL at 15:34

## 2021-07-10 RX ADMIN — IBUPROFEN 800 MG: 800 TABLET, FILM COATED ORAL at 08:48

## 2021-07-10 ASSESSMENT — PAIN SCALES - GENERAL
PAINLEVEL_OUTOF10: 8
PAINLEVEL_OUTOF10: 5
PAINLEVEL_OUTOF10: 8
PAINLEVEL_OUTOF10: 8
PAINLEVEL_OUTOF10: 9
PAINLEVEL_OUTOF10: 8
PAINLEVEL_OUTOF10: 5
PAINLEVEL_OUTOF10: 0
PAINLEVEL_OUTOF10: 0
PAINLEVEL_OUTOF10: 10
PAINLEVEL_OUTOF10: 8

## 2021-07-10 ASSESSMENT — PAIN DESCRIPTION - PAIN TYPE: TYPE: SURGICAL PAIN

## 2021-07-10 ASSESSMENT — PAIN DESCRIPTION - DESCRIPTORS: DESCRIPTORS: DISCOMFORT;SORE

## 2021-07-10 ASSESSMENT — PAIN DESCRIPTION - ORIENTATION: ORIENTATION: LOWER

## 2021-07-10 ASSESSMENT — PAIN DESCRIPTION - LOCATION: LOCATION: ABDOMEN

## 2021-07-10 NOTE — PROGRESS NOTES
POST OPERATIVE DAY # 1    Eloisa Kingston is a 26 y.o. female   This patient was seen and examined today. Her pregnancy was complicated by:   Patient Active Problem List   Diagnosis    Hx PTD @ 31w (G1)    FHx DM (pt's mother)    Hx GDMA    Marijuana smoker    Depression    Hx of  x5     h/o Cocaine abuse     history of -Lost custody of children    High-risk pregnancy in third trimester    Hx STIs    Hx PROM @ 37w0d (G4)    Hx PreE (G1)    FamHx CHD (pt's child)    GDMA1    RLTCS w/ RRS 21 F APG 8/ Wt 6#3    38 weeks gestation of pregnancy       Today she is doing well without any chief complaint. Her lochia is light. She denies chest pain, shortness of breath, headache and lightheadedness. She is bottle feeding and she denies any signs or symptoms of mastitis. She is ambulating well. She is voiding without difficulty. She currently denies S/S of postpartum depression. Flatus present. Bowel movement absent. She is tolerating solids.     Vital Signs:  Vitals:    21 0742 21 1200 21 1600 21   BP: (!) 140/81 128/81 117/62 107/67   Pulse: 76 65 72 81   Resp: 16 18 20 18   Temp: 98.3 °F (36.8 °C)  98.4 °F (36.9 °C) 97.7 °F (36.5 °C)   SpO2: 100%  98% 98%         Urine Input & Output last 24hrs:     Intake/Output Summary (Last 24 hours) at 7/10/2021 0056  Last data filed at 2021 2130  Gross per 24 hour   Intake 2450 ml   Output 1805 ml   Net 645 ml       Physical Exam:  General:  no apparent distress, alert and cooperative  Neurologic:  alert, oriented, normal speech, no focal findings or movement disorder noted  Lungs:  No increased work of breathing, good air exchange, clear to auscultation bilaterally, no crackles or wheezing  Heart:  regular rate and rhythm    Abdomen: abdomen soft, non-distended, non-tender  Fundus: non-tender, normal size, firm, below umbilicus  Incision: clean, dry, intact with ABD in place  Extremities:  no calf tenderness, non edematous    Labs:  Lab Results   Component Value Date    WBC 25.4 (H) 2021    HGB 9.4 (L) 2021    HCT 28.4 (L) 2021    MCV 92.5 2021     2021       Assessment/Plan:  1. Lnea Zuluaga is a [id number] POD # 1 s/p RLTCS w/ RRS   - Doing well, VSS    - female infant in [address]   - Encourage ambulation and use of incentive spirometer   - D/C alarcon catheter and saline lock IV on POD #1    - CBC awaiting   -Keflex/flagyl x 48 hours  2. Rh positive/Rubella immune  3. Bottle feeding   - Denies s/s mastitis   4. Elevated BP w/ out hypertensive dx  - Patient had several elevated Bps not 4 hour apart does not meet criteria for hypertensive dx   - VSS  - Denies s/s PreE  5. Right broad ligament hematoma  - Noted during   - Continue trending Hgb   - VSS  6. Anemia  - Hgb 12>9.6>9. 4  - VSS  - Repeat CBC this am   7. E. Coli UTI  - Will continue Keflex 500 mg QID x 7 days   8. GDMA1  - will need 6 week 2 hour GTT PP  9. Depression  - Denies SI/HI  - No meds  -  Consult-mom will be discharged to [facility] on Monday @ [address]47  - Baby to foster care   10. Limited PNC/Lost custody of children  - Encourage close outpatient follow up  -  Consult-mom will be discharged to [facility] on Monday @ [address]42  - Baby to foster care   11. THC and Cocaine UDS +  - Encourage cessation   -  Consult-mom will be discharged to [facility] on Monday @ [address]50  - Baby to foster care   - Urine fentanyl/Bup pending   12. Continue post-op care. Counseling Completed:  Secondary Smoke risks and Sudden Infant Death Syndrome were reviewed with recommendations. Infant sleeping, \"back to sleep\" and avoidance of co-sleeping recommendations were reviewed. Signs and Symptoms of Post Partum Depression were reviewed. The patient is to call if any occur. Signs and symptoms of Mastitis were reviewed. The patient is to call if any occur for follow up.   Discharge instructions including pelvic rest, incision care, 15 lb weight restriction, no driving with pain medicine and office follow-up were reviewed with patient     Attending Physician: Dr. Yair Rivera, DO  Ob/Gyn Resident  7/10/2021, 12:56 AM  Senior Residents Attestation Statement  I was present with the resident physician during the history and exam. I discussed the findings and plans with the resident physician and agree as documented in her note     Zackary Kc,    OB/GYN Resident, PGY3  [address] [place]

## 2021-07-10 NOTE — PROGRESS NOTES
Ob/Gyn Resident Interval Note       Patient's lab results reviewed, significant for anemia with Hgb 6.8, decreased from 9.4. Patient reports she is feeling very fatigued with some lightheadedness/dizziness and has no energy. Discussed benefits of pRBC transfusion given lab values and clinical picture. Discussed risks of transfusion including transfusion reactions and transmission of blood borne pathogens. Patient reports understanding and wishes to proceed with transfusion. Consent signed and in chart. Will re-evaluate H&H 4 hours post-transfusion.       Vitals:    07/09/21 1600 07/09/21 2000 07/10/21 0000 07/10/21 0400   BP: 117/62 107/67 108/62 136/69   Pulse: 72 81 83 99   Resp: 20 18 20 18   Temp: 98.4 °F (36.9 °C) 97.7 °F (36.5 °C) 97.8 °F (36.6 °C) 98.6 °F (37 °C)   SpO2: 98% 98% 99%          Recent Results (from the past 12 hour(s))   CBC auto differential    Collection Time: 07/10/21  6:53 AM   Result Value Ref Range    WBC 19.4 (H) 3.5 - 11.3 k/uL    RBC 2.19 (L) 3.95 - 5.11 m/uL    Hemoglobin 6.8 (LL) 11.9 - 15.1 g/dL    Hematocrit 21.4 (L) 36.3 - 47.1 %    MCV 97.7 82.6 - 102.9 fL    MCH 31.1 25.2 - 33.5 pg    MCHC 31.8 28.4 - 34.8 g/dL    RDW 14.6 (H) 11.8 - 14.4 %    Platelets 079 596 - 437 k/uL    MPV 11.8 8.1 - 13.5 fL    NRBC Automated 0.1 (H) 0.0 per 100 WBC    Differential Type NOT REPORTED     WBC Morphology NOT REPORTED     RBC Morphology NOT REPORTED     Platelet Estimate NOT REPORTED     Immature Granulocytes 1 (H) 0 %    Seg Neutrophils 76 (H) 36 - 65 %    Lymphocytes 19 (L) 24 - 43 %    Monocytes 4 3 - 12 %    Eosinophils % 0 (L) 1 - 4 %    Basophils 0 0 - 2 %    Absolute Immature Granulocyte 0.19 0.00 - 0.30 k/uL    Segs Absolute 14.74 (H) 1.50 - 8.10 k/uL    Absolute Lymph # 3.69 1.10 - 3.70 k/uL    Absolute Mono # 0.78 0.10 - 1.20 k/uL    Absolute Eos # 0.00 0.00 - 0.44 k/uL    Basophils Absolute 0.00 0.00 - 0.20 k/uL    Morphology ANISOCYTOSIS PRESENT          Plan discussed with attending.        Samy Contreras DO  Ob/Gyn Resident  Pager: [phone] [facility] [facility]   7/10/42430:25 AM

## 2021-07-11 PROBLEM — O13.9 GESTATIONAL HYPERTENSION, ANTEPARTUM: Status: ACTIVE | Noted: 2021-07-11

## 2021-07-11 LAB
ABO/RH: NORMAL
ABSOLUTE EOS #: 0.08 K/UL (ref 0–0.44)
ABSOLUTE IMMATURE GRANULOCYTE: 0.14 K/UL (ref 0–0.3)
ABSOLUTE LYMPH #: 2.6 K/UL (ref 1.1–3.7)
ABSOLUTE MONO #: 0.44 K/UL (ref 0.1–1.2)
ALBUMIN SERPL-MCNC: 2.3 G/DL (ref 3.5–5.2)
ALBUMIN/GLOBULIN RATIO: 0.7 (ref 1–2.5)
ALP BLD-CCNC: 170 U/L (ref 35–104)
ALT SERPL-CCNC: 13 U/L (ref 5–33)
ANION GAP SERPL CALCULATED.3IONS-SCNC: 12 MMOL/L (ref 9–17)
ANTIBODY SCREEN: NEGATIVE
ARM BAND NUMBER: NORMAL
AST SERPL-CCNC: 20 U/L
BASOPHILS # BLD: 0 % (ref 0–2)
BASOPHILS ABSOLUTE: 0.03 K/UL (ref 0–0.2)
BILIRUB SERPL-MCNC: 0.16 MG/DL (ref 0.3–1.2)
BLD PROD TYP BPU: NORMAL
BUN BLDV-MCNC: 9 MG/DL (ref 6–20)
BUN/CREAT BLD: ABNORMAL (ref 9–20)
BUPRENORPHINE GLUCURONIDE URINE: <5 NG/ML
BUPRENORPHINE URINE: <2 NG/ML
CALCIUM SERPL-MCNC: 8.5 MG/DL (ref 8.6–10.4)
CHLORIDE BLD-SCNC: 102 MMOL/L (ref 98–107)
CO2: 23 MMOL/L (ref 20–31)
CREAT SERPL-MCNC: 0.64 MG/DL (ref 0.5–0.9)
CROSSMATCH RESULT: NORMAL
DIFFERENTIAL TYPE: ABNORMAL
DISPENSE STATUS BLOOD BANK: NORMAL
EOSINOPHILS RELATIVE PERCENT: 1 % (ref 1–4)
EXPIRATION DATE: NORMAL
GFR AFRICAN AMERICAN: >60 ML/MIN
GFR NON-AFRICAN AMERICAN: >60 ML/MIN
GFR SERPL CREATININE-BSD FRML MDRD: ABNORMAL ML/MIN/{1.73_M2}
GFR SERPL CREATININE-BSD FRML MDRD: ABNORMAL ML/MIN/{1.73_M2}
GLUCOSE BLD-MCNC: 112 MG/DL (ref 70–99)
HCT VFR BLD CALC: 26.2 % (ref 36.3–47.1)
HEMOGLOBIN: 8.1 G/DL (ref 11.9–15.1)
IMMATURE GRANULOCYTES: 1 %
LYMPHOCYTES # BLD: 23 % (ref 24–43)
MCH RBC QN AUTO: 30.8 PG (ref 25.2–33.5)
MCHC RBC AUTO-ENTMCNC: 30.9 G/DL (ref 28.4–34.8)
MCV RBC AUTO: 99.6 FL (ref 82.6–102.9)
MONOCYTES # BLD: 4 % (ref 3–12)
NALOXONE URINE: <100 NG/ML
NORBUPRENORPHINE GLUCURONIDE URINE: <5 NG/ML
NORBUPRENORPHINE, URINE: <2 NG/ML
NRBC AUTOMATED: 0.4 PER 100 WBC
PDW BLD-RTO: 14.6 % (ref 11.8–14.4)
PLATELET # BLD: 168 K/UL (ref 138–453)
PLATELET ESTIMATE: ABNORMAL
PMV BLD AUTO: 11.2 FL (ref 8.1–13.5)
POTASSIUM SERPL-SCNC: 3.8 MMOL/L (ref 3.7–5.3)
RBC # BLD: 2.63 M/UL (ref 3.95–5.11)
RBC # BLD: ABNORMAL 10*6/UL
SEG NEUTROPHILS: 71 % (ref 36–65)
SEGMENTED NEUTROPHILS ABSOLUTE COUNT: 7.93 K/UL (ref 1.5–8.1)
SODIUM BLD-SCNC: 137 MMOL/L (ref 135–144)
TOTAL PROTEIN: 5.8 G/DL (ref 6.4–8.3)
TRANSFUSION STATUS: NORMAL
UNIT DIVISION: 0
UNIT NUMBER: NORMAL
WBC # BLD: 11.2 K/UL (ref 3.5–11.3)
WBC # BLD: ABNORMAL 10*3/UL

## 2021-07-11 PROCEDURE — 85025 COMPLETE CBC W/AUTO DIFF WBC: CPT

## 2021-07-11 PROCEDURE — 6370000000 HC RX 637 (ALT 250 FOR IP): Performed by: STUDENT IN AN ORGANIZED HEALTH CARE EDUCATION/TRAINING PROGRAM

## 2021-07-11 PROCEDURE — 1220000000 HC SEMI PRIVATE OB R&B

## 2021-07-11 PROCEDURE — 80053 COMPREHEN METABOLIC PANEL: CPT

## 2021-07-11 PROCEDURE — 36415 COLL VENOUS BLD VENIPUNCTURE: CPT

## 2021-07-11 RX ORDER — CEPHALEXIN 500 MG/1
500 CAPSULE ORAL 4 TIMES DAILY
Qty: 20 CAPSULE | Refills: 0 | Status: SHIPPED | OUTPATIENT
Start: 2021-07-11 | End: 2021-07-16

## 2021-07-11 RX ORDER — ONDANSETRON 4 MG/1
4 TABLET, ORALLY DISINTEGRATING ORAL EVERY 6 HOURS PRN
Status: DISCONTINUED | OUTPATIENT
Start: 2021-07-11 | End: 2021-07-12 | Stop reason: HOSPADM

## 2021-07-11 RX ADMIN — ONDANSETRON 4 MG: 4 TABLET, ORALLY DISINTEGRATING ORAL at 04:33

## 2021-07-11 RX ADMIN — OXYCODONE HYDROCHLORIDE AND ACETAMINOPHEN 2 TABLET: 5; 325 TABLET ORAL at 18:43

## 2021-07-11 RX ADMIN — CEPHALEXIN 500 MG: 500 CAPSULE ORAL at 13:47

## 2021-07-11 RX ADMIN — CEPHALEXIN 500 MG: 500 CAPSULE ORAL at 04:47

## 2021-07-11 RX ADMIN — METRONIDAZOLE 500 MG: 500 TABLET, FILM COATED ORAL at 04:47

## 2021-07-11 RX ADMIN — OXYCODONE HYDROCHLORIDE AND ACETAMINOPHEN 2 TABLET: 5; 325 TABLET ORAL at 04:48

## 2021-07-11 RX ADMIN — IBUPROFEN 800 MG: 800 TABLET, FILM COATED ORAL at 23:01

## 2021-07-11 RX ADMIN — Medication 1 TABLET: at 08:03

## 2021-07-11 RX ADMIN — METRONIDAZOLE 500 MG: 500 TABLET, FILM COATED ORAL at 16:05

## 2021-07-11 RX ADMIN — OXYCODONE HYDROCHLORIDE AND ACETAMINOPHEN 2 TABLET: 5; 325 TABLET ORAL at 23:01

## 2021-07-11 RX ADMIN — IBUPROFEN 800 MG: 800 TABLET, FILM COATED ORAL at 13:46

## 2021-07-11 RX ADMIN — IBUPROFEN 800 MG: 800 TABLET, FILM COATED ORAL at 04:47

## 2021-07-11 RX ADMIN — CEPHALEXIN 500 MG: 500 CAPSULE ORAL at 23:01

## 2021-07-11 RX ADMIN — OXYCODONE HYDROCHLORIDE AND ACETAMINOPHEN 2 TABLET: 5; 325 TABLET ORAL at 13:46

## 2021-07-11 RX ADMIN — CEPHALEXIN 500 MG: 500 CAPSULE ORAL at 16:05

## 2021-07-11 RX ADMIN — OXYCODONE HYDROCHLORIDE AND ACETAMINOPHEN 2 TABLET: 5; 325 TABLET ORAL at 09:06

## 2021-07-11 RX ADMIN — CEPHALEXIN 500 MG: 500 CAPSULE ORAL at 08:03

## 2021-07-11 ASSESSMENT — PAIN SCALES - GENERAL
PAINLEVEL_OUTOF10: 8
PAINLEVEL_OUTOF10: 8
PAINLEVEL_OUTOF10: 9

## 2021-07-11 NOTE — PROGRESS NOTES
Obstetric/Gynecology Resident Interval Note    Patient now meets criteria for gHTN, preE labs ordered.      Vitals:    07/10/21 2030 07/11/21 0300 07/11/21 0800 07/11/21 1600   BP: (!) 140/79 138/81 137/71 (!) 142/86   Pulse: 80 98 98 98   Resp: 17 17 18 18   Temp: 97.9 °F (36.6 °C) 98 °F (36.7 °C) 98.1 °F (36.7 °C) 98 °F (36.7 °C)   TempSrc:       SpO2: 100% 99%           Aria Schneider DO  OB/GYN Resident, PGY3  [facility]  7/11/2021, 6:07 PM

## 2021-07-11 NOTE — PROGRESS NOTES
POST OPERATIVE DAY # 2    Sarika Desai is a 26 y.o. female POD # 2 s/p RLTCS w/ RRS on 21  This patient was seen and examined. Today she is doing well without any chief complaint. Her pain is controlled with Motrin/Percocet. Her lochia is light. She denies chest pain, shortness of breath, headache and blurred vision. She is not breast feeding and she denies any signs or symptoms of mastitis. She is ambulating well. She is voiding without difficulty. She currently denies S/S of postpartum depression. Flatus present. Bowel movement absent. She is tolerating solids.     Her pregnancy was complicated by:   Patient Active Problem List   Diagnosis    Hx PTD @ 31w (G1)    FHx DM (pt's mother)    Hx GDMA    Marijuana smoker    Depression    Hx of  x5     h/o Cocaine abuse     history of -Lost custody of children    High-risk pregnancy in third trimester    Hx STIs    Hx PROM @ 37w0d (G4)    Hx PreE (G1)    FamHx CHD (pt's child)    GDMA1    RLTCS w/ RRS 21 F APG 8/9 Wt 6#3    38 weeks gestation of pregnancy         Vital Signs:  Vitals:    07/10/21 1111 07/10/21 1600 07/10/21 2030 21 0300   BP: 104/61 130/81 (!) 140/79 138/81   Pulse: 66 82 80 98   Resp: 17 18 17 17   Temp: 97.5 °F (36.4 °C) 98.1 °F (36.7 °C) 97.9 °F (36.6 °C) 98 °F (36.7 °C)   TempSrc: Oral      SpO2: 100% 100% 100% 99%         Urine Input & Output last 24hrs:     Intake/Output Summary (Last 24 hours) at 2021 0645  Last data filed at 7/10/2021 1139  Gross per 24 hour   Intake 621 ml   Output --   Net 621 ml     `  Physical Exam:  General:  awake, alert, cooperative, no apparent distress, and appears stated age  Neurologic:  alert, oriented, normal speech, no focal findings or movement disorder noted  Lungs:  No increased work of breathing, good air exchange, clear to auscultation bilaterally, no crackles or wheezing  Heart:  Normal apical impulse, regular rate and rhythm, normal S1 and S2, no S3 or S4, and no murmur noted    Abdomen: Soft, nontender, nondistended, bowel sounds present, no rebound, rigidity or guarding   Fundus: non-tender, firm, below umbilicus  Incision: C/D/I with steri strips in place  Extremities:  no calf tenderness, non edematous    Labs:  Lab Results   Component Value Date    WBC 15.6 (H) 07/10/2021    HGB 8.1 (L) 07/10/2021    HCT 26.3 (L) 07/10/2021    .0 07/10/2021    PLT See Reflexed IPF Result 07/10/2021       Assessment/Plan:  1. Oly Juarez is a [id number] POD # 2 s/p RLTCS w/ RRS   - Doing well, VSS    - female infant in [address]   - Encourage ambulation and use of incentive spirometer   - S/p alarcon catheter   - Post-op Hgb 6.8 (see below)   - Motrin/percocet PRN for pain control    - Incision C/D/I    - Continue post-op care   - Plan for D/C to [facility] 7/12  2. Rh positive/Rubella immune  - Rhogam/MMR not indicated  3. Bottle feeding   - Denies s/sx mastitis   4. Anemia complicated by acute blood loss   - Hgb 9.6 on admission   - Hgb 9.4 > 6.8 on POD#1, patient was symptomatic with fatigue and positional dizziness   - S/p 1u pRBCs, post-transfusion hgb 8.1   - VSS, symptoms improved post-transfusion  - Rx iron printed for discharge   5. Elevated BP's  - BP elevated 7/9 @ 0555 > 0742   - No other elevated BP, did not meet criteria for gHTN   - Denies s/sx PreE   6. E. Coli UTI  - Positive urine culture 7/9/21   - Denies dysuria, frequency or urgency   - Continue Keflex 500mg QID   7. GDMA1  - Patient will need 2-hr GTT at 6 week postpartum appointment   8. Depression   - Currently on no medications   - Denies SI/HI, mood changes   -  involved   - EPDS on D/C   9. Polysubstance abuse   - Cocaine and THC use, positive UDS on admission   - Patient has previously lost custody of children   -  consulted, [facility] contacted. Baby will discharge to care of [facility]  - Patient will discharge to [facility] on POD#4  10.  BMI 22      Counseling Completed:  Secondary Smoke risks and Sudden Infant Death Syndrome were reviewed with recommendations. Infant sleeping, \"back to sleep\" and avoidance of co-sleeping recommendations were reviewed. Signs and Symptoms of Post Partum Depression were reviewed. The patient is to call if any occur. Signs and symptoms of Mastitis were reviewed. The patient is to call if any occur for follow up. Discharge instructions including pelvic rest, incision care, 15 lb weight restriction, no driving with pain medicine and office follow-up were reviewed with patient     Attending Physician: Dr. Radha Chang DO  Ob/Gyn Resident  7/11/2021, 6:45 AM           Attending Physician Statement  I have discussed the care of Gaye Guillaume, including pertinent history and exam findings,  with the resident. I have seen and examined the patient and the key elements of all parts of the encounter have been performed by me. I agree with the assessment, plan and orders as documented by the resident.   [facility] Modifier)    Elijah Turner DO

## 2021-07-12 VITALS
TEMPERATURE: 98.3 F | SYSTOLIC BLOOD PRESSURE: 141 MMHG | DIASTOLIC BLOOD PRESSURE: 88 MMHG | HEART RATE: 88 BPM | OXYGEN SATURATION: 99 % | RESPIRATION RATE: 18 BRPM

## 2021-07-12 LAB
CREATININE URINE: 42.2 MG/DL (ref 28–217)
CULTURE: NORMAL
Lab: NORMAL
SPECIMEN DESCRIPTION: NORMAL
SURGICAL PATHOLOGY REPORT: NORMAL
TOTAL PROTEIN, URINE: 13 MG/DL
URINE TOTAL PROTEIN CREATININE RATIO: 0.31 (ref 0–0.2)

## 2021-07-12 PROCEDURE — 6370000000 HC RX 637 (ALT 250 FOR IP): Performed by: STUDENT IN AN ORGANIZED HEALTH CARE EDUCATION/TRAINING PROGRAM

## 2021-07-12 PROCEDURE — 84156 ASSAY OF PROTEIN URINE: CPT

## 2021-07-12 PROCEDURE — G0480 DRUG TEST DEF 1-7 CLASSES: HCPCS

## 2021-07-12 PROCEDURE — 82570 ASSAY OF URINE CREATININE: CPT

## 2021-07-12 RX ORDER — ONDANSETRON 4 MG/1
4 TABLET, ORALLY DISINTEGRATING ORAL EVERY 8 HOURS PRN
Qty: 15 TABLET | Refills: 0 | Status: SHIPPED | OUTPATIENT
Start: 2021-07-12 | End: 2021-07-17

## 2021-07-12 RX ADMIN — IBUPROFEN 800 MG: 800 TABLET, FILM COATED ORAL at 08:01

## 2021-07-12 RX ADMIN — CEPHALEXIN 500 MG: 500 CAPSULE ORAL at 08:00

## 2021-07-12 RX ADMIN — OXYCODONE HYDROCHLORIDE AND ACETAMINOPHEN 2 TABLET: 5; 325 TABLET ORAL at 08:01

## 2021-07-12 RX ADMIN — OXYCODONE HYDROCHLORIDE AND ACETAMINOPHEN 2 TABLET: 5; 325 TABLET ORAL at 03:45

## 2021-07-12 RX ADMIN — Medication 1 TABLET: at 08:00

## 2021-07-12 ASSESSMENT — PAIN SCALES - GENERAL
PAINLEVEL_OUTOF10: 9
PAINLEVEL_OUTOF10: 9

## 2021-07-12 NOTE — PROGRESS NOTES
I have reviewed all AWHONN Post-Birth Warning Signs and essential teaching points for pulmonary embolism, cardiac disease, hypertensive disorders of pregnancy, obstetric hemorrhage, venous thromboembolism, infection, and postpartum depression with the patient. I have informed the patient on when to call their healthcare provider and when to call 911. I have discussed with the patient  the importance of scheduling a follow-up visit with their physician, nurse practitioner or midwife and provided them with correct contact information for appointment. I have provided the patient with a copy of the \"Save Your Life\" handout. The patient has acknowledged receiving and understanding this education with her signature.

## 2021-07-12 NOTE — PROGRESS NOTES
POST OPERATIVE DAY # 3    Karthikeyan Raymundo is a 26 y.o. female   This patient was seen and examined today. Her pregnancy was complicated by:   Patient Active Problem List   Diagnosis    Hx PTD @ 31w (G1)    FHx DM (pt's mother)    Hx GDMA    Marijuana smoker    Depression    Hx of  x5     h/o Cocaine abuse     history of -Lost custody of children    High-risk pregnancy in third trimester    Hx STIs    Hx PROM @ 37w0d (G4)    Hx PreE (G1)    FamHx CHD (pt's child)    GDMA1    RLTCS w/ RRS 21 F APG 8/9 Wt 6#3    38 weeks gestation of pregnancy    Gestational hypertension       Today she is doing well without any chief complaint. Her lochia is light. She denies chest pain, shortness of breath and headache. She is  bottlet feeding and she denies any signs or symptoms of mastitis. She is ambulating well. She is voiding without difficulty. She currently denies S/S of postpartum depression. Flatus present. Bowel movement absent. She is tolerating solids.     Vital Signs:  Vitals:    21 1600 21 2000 21 0000 21 0400   BP: (!) 142/86 (!) 148/84 (!) 143/76 139/86   Pulse: 98 75 87 70   Resp: 18 18 18 18   Temp: 98 °F (36.7 °C) 98.4 °F (36.9 °C) 98.4 °F (36.9 °C) 98.1 °F (36.7 °C)   TempSrc:  Oral Oral Oral   SpO2:             Urine Input & Output last 24hrs:   No intake or output data in the 24 hours ending 21 0622    Physical Exam:  General:  no apparent distress, alert and cooperative  Neurologic:  alert, oriented, normal speech, no focal findings or movement disorder noted  Lungs:  No increased work of breathing, good air exchange, clear to auscultation bilaterally, no crackles or wheezing  Heart:  regular rate and rhythm    Abdomen: abdomen soft, non-distended, non-tender  Fundus: non-tender, normal size, firm, below umbilicus  Incision: clean, dry and intact with steri strips intact  Extremities:  no calf tenderness, non edematous    Labs:  Lab Results   Component Value Date occur.  Signs and symptoms of Mastitis were reviewed. The patient is to call if any occur for follow up. Discharge instructions including pelvic rest, incision care, 15 lb weight restriction, no driving with pain medicine and office follow-up were reviewed with patient     Attending Physician: Dr. Manjit Alvarez DO  Ob/Gyn Resident  2021, 6:22 AM     Date: 2021  Time: 8:28 PM      Patient Name: Taylor Persaud  Patient : 1990  Room/Bed: 9065/4190-42  Admission Date/Time: 2021 10:27 PM        Attending Physician Statement  I have discussed the care of Taylor Persaud, including pertinent history and exam findings with the resident. I have reviewed and edited their note in the electronic medical record. The key elements of all parts of the encounter have been performed/reviewed by me . I agree with the assessment, plan and orders as documented by the resident. The level of care submitted represents to the best of my ability the care documented in the medical record today. [facility] Modifier. This service has been performed in part by a resident under the direction of a teaching physician. Cain Salazar was tearful and withdrawn during morning rounds because she was anticipating her baby being taken to foster care this afternoon. Plan of care is for Cain Salazar to go to [facility]. Extensive emotional support given.      Attending's Name:  Viviane Bates DO

## 2021-07-12 NOTE — PROGRESS NOTES
Patient off floor when Maico Holden arrived from transport to [facility]. Writer downstairs and found patient who said \"I'm not going to run away. \" Writer assisted patient to Maico Holden. Patient in car for transport. Left with all belongings and Medications.

## 2021-07-12 NOTE — PROGRESS NOTES
Social Work    Spoke with [facility] He reported that [facility] is taking custody of patients baby and he will be up around 1 pm to pick the baby up. Foster placement is [employer].  will be with foster father for discharge. BJORN will update RN. Received call from [facility]  , foster father will be having his partner Lydia Rockwell come to  the baby.  will be here also.

## 2021-07-12 NOTE — PROGRESS NOTES
Discharge order received. Discharge instructions went over with patient who verbalized understanding. Awaiting transportation to [facility] with ETA of noon.

## 2021-07-12 NOTE — PROGRESS NOTES
Patient filled out half of EPDS Depression Scale but refused to fill out the rest. Updated Dr Jada Woodward.

## 2021-07-12 NOTE — PROGRESS NOTES
Social Work    Contacted RN for update. Spoke with Ondina FRAGA, she reported that patient does not know how to get on zoom call for [facility] staffing today and had tried to contact the . Informed her that [facility] should be in touch with her but SW would reach out to . Spoke with Juan Banks, [place] [phone] . He reported that he had sent a zoom link to mom via her email and that he had tried to contact her and had left a voicemail. He stated patient had left him a voicemail at 10:06 am. Updated RN of conversation with . Await discharge plan from [facility].

## 2021-07-13 PROBLEM — O14.90 PRE-ECLAMPSIA: Status: ACTIVE | Noted: 2021-07-13

## 2021-07-14 LAB
FENTANYL AND METABOLITES, URINE: <1 NG/ML
NORFENTANYL, URINE: <1 NG/ML

## 2021-07-15 LAB
FENTANYL AND METABOLITES, URINE: <1 NG/ML
NORFENTANYL, URINE: NORMAL NG/ML

## 2022-03-02 ENCOUNTER — HOSPITAL ENCOUNTER (EMERGENCY)
Age: 32
Discharge: HOME | End: 2022-03-02
Attending: EMERGENCY MEDICINE
Payer: MEDICAID

## 2022-03-02 VITALS
WEIGHT: 120 LBS | OXYGEN SATURATION: 99 % | HEART RATE: 73 BPM | RESPIRATION RATE: 16 BRPM | SYSTOLIC BLOOD PRESSURE: 91 MMHG | TEMPERATURE: 97.5 F | DIASTOLIC BLOOD PRESSURE: 62 MMHG | BODY MASS INDEX: 18.83 KG/M2 | HEIGHT: 67 IN

## 2022-03-02 DIAGNOSIS — T50.901A ACCIDENTAL DRUG OVERDOSE, INITIAL ENCOUNTER: Primary | ICD-10-CM

## 2022-03-02 LAB
ABSOLUTE EOS #: 0.24 K/UL (ref 0–0.44)
ABSOLUTE IMMATURE GRANULOCYTE: 0.01 K/UL (ref 0–0.3)
ABSOLUTE LYMPH #: 2.02 K/UL (ref 1.1–3.7)
ABSOLUTE MONO #: 0.23 K/UL (ref 0.1–1.2)
ACETAMINOPHEN LEVEL: <10 UG/ML (ref 10–30)
ANION GAP SERPL CALCULATED.3IONS-SCNC: 6 MMOL/L (ref 9–17)
BASOPHILS # BLD: 1 % (ref 0–2)
BASOPHILS ABSOLUTE: 0.03 K/UL (ref 0–0.2)
BUN BLDV-MCNC: 14 MG/DL (ref 6–20)
BUN/CREAT BLD: 17 (ref 9–20)
CALCIUM SERPL-MCNC: 9.7 MG/DL (ref 8.6–10.4)
CHLORIDE BLD-SCNC: 103 MMOL/L (ref 98–107)
CO2: 29 MMOL/L (ref 20–31)
CREAT SERPL-MCNC: 0.83 MG/DL (ref 0.5–0.9)
EOSINOPHILS RELATIVE PERCENT: 5 % (ref 1–4)
ETHANOL PERCENT: <0.01 %
ETHANOL: <10 MG/DL
GFR AFRICAN AMERICAN: >60 ML/MIN
GFR NON-AFRICAN AMERICAN: >60 ML/MIN
GFR SERPL CREATININE-BSD FRML MDRD: ABNORMAL ML/MIN/{1.73_M2}
GLUCOSE BLD-MCNC: 104 MG/DL (ref 70–99)
GLUCOSE BLD-MCNC: 88 MG/DL (ref 65–105)
HCT VFR BLD CALC: 40.2 % (ref 36.3–47.1)
HEMOGLOBIN: 13 G/DL (ref 11.9–15.1)
IMMATURE GRANULOCYTES: 0 %
LYMPHOCYTES # BLD: 38 % (ref 24–43)
MCH RBC QN AUTO: 30.3 PG (ref 25.2–33.5)
MCHC RBC AUTO-ENTMCNC: 32.3 G/DL (ref 28.4–34.8)
MCV RBC AUTO: 93.7 FL (ref 82.6–102.9)
MONOCYTES # BLD: 4 % (ref 3–12)
NRBC AUTOMATED: 0 PER 100 WBC
PDW BLD-RTO: 13.4 % (ref 11.8–14.4)
PLATELET # BLD: 254 K/UL (ref 138–453)
PMV BLD AUTO: 9.3 FL (ref 8.1–13.5)
POTASSIUM SERPL-SCNC: 4.2 MMOL/L (ref 3.7–5.3)
RBC # BLD: 4.29 M/UL (ref 3.95–5.11)
SALICYLATE LEVEL: <1 MG/DL (ref 3–10)
SEG NEUTROPHILS: 52 % (ref 36–65)
SEGMENTED NEUTROPHILS ABSOLUTE COUNT: 2.83 K/UL (ref 1.5–8.1)
SODIUM BLD-SCNC: 138 MMOL/L (ref 135–144)
TOXIC TRICYCLIC SC,BLOOD: NEGATIVE
WBC # BLD: 5.4 K/UL (ref 3.5–11.3)

## 2022-03-02 PROCEDURE — 80048 BASIC METABOLIC PNL TOTAL CA: CPT

## 2022-03-02 PROCEDURE — G0480 DRUG TEST DEF 1-7 CLASSES: HCPCS

## 2022-03-02 PROCEDURE — 85025 COMPLETE CBC W/AUTO DIFF WBC: CPT

## 2022-03-02 PROCEDURE — 82947 ASSAY GLUCOSE BLOOD QUANT: CPT

## 2022-03-02 PROCEDURE — 6370000000 HC RX 637 (ALT 250 FOR IP): Performed by: PHYSICIAN ASSISTANT

## 2022-03-02 PROCEDURE — 93005 ELECTROCARDIOGRAM TRACING: CPT | Performed by: PHYSICIAN ASSISTANT

## 2022-03-02 PROCEDURE — 80143 DRUG ASSAY ACETAMINOPHEN: CPT

## 2022-03-02 PROCEDURE — 80307 DRUG TEST PRSMV CHEM ANLYZR: CPT

## 2022-03-02 PROCEDURE — 80179 DRUG ASSAY SALICYLATE: CPT

## 2022-03-02 PROCEDURE — 99283 EMERGENCY DEPT VISIT LOW MDM: CPT

## 2022-03-02 RX ORDER — ACETAMINOPHEN 500 MG
1000 TABLET ORAL ONCE
Status: COMPLETED | OUTPATIENT
Start: 2022-03-02 | End: 2022-03-02

## 2022-03-02 RX ADMIN — ACETAMINOPHEN 1000 MG: 500 TABLET ORAL at 20:06

## 2022-03-02 ASSESSMENT — PAIN SCALES - GENERAL: PAINLEVEL_OUTOF10: 10

## 2022-03-03 LAB
EKG ATRIAL RATE: 70 BPM
EKG P AXIS: 50 DEGREES
EKG P-R INTERVAL: 164 MS
EKG Q-T INTERVAL: 440 MS
EKG QRS DURATION: 78 MS
EKG QTC CALCULATION (BAZETT): 475 MS
EKG R AXIS: 27 DEGREES
EKG T AXIS: 42 DEGREES
EKG VENTRICULAR RATE: 70 BPM

## 2022-03-03 NOTE — ED PROVIDER NOTES
[address] [place] [address] ED  [facility]      Pt Name: Susan Dick  MRN: [id number]  [place] 1990  Date ofevaluation: 3/2/2022  Provider: Marialuisa Galarza PA-C    CHIEF COMPLAINT       Chief Complaint   Patient presents with    Drug Overdose     smoked marijuana from a new dealer         HISTORY OF PRESENT ILLNESS  (Location/Symptom, Timing/Onset, Context/Setting, Quality, Duration, Modifying Factors, Severity.)   Susan Dick is a 33 y.o. female who presents to the emergency department with weak fatigued after smoking marijuana from a new dealer. No CP or SOB. Arrives via EMS. No other complaints. Nursing Notes were reviewed.     ALLERGIES     Magnesium-containing compounds and Keflex [cephalexin]    CURRENT MEDICATIONS       Discharge Medication List as of 3/2/2022  8:13 PM      CONTINUE these medications which have NOT CHANGED    Details   ibuprofen (ADVIL;MOTRIN) 800 MG tablet Take 1 tablet by mouth every 8 hours as needed for Pain or Fever, Disp-60 tablet, R-0Print      ferrous sulfate (IRON 325) 325 (65 Fe) MG tablet Take 1 tablet by mouth 2 times daily, Disp-60 tablet, R-0Print      loperamide (IMODIUM) 2 MG capsule Take 1 capsule by mouth 4 times daily as needed for Diarrhea, Disp-30 capsule, R-0Normal      ondansetron (ZOFRAN) 4 MG tablet Take 1 tablet by mouth every 8 hours as needed for Nausea or Vomiting, Disp-30 tablet, R-0Normal      Prenatal Vit-Fe Fumarate-FA (PRENATAL VITAMIN) 27-0.8 MG TABS Take 1 tablet by mouth daily, Disp-30 tablet, R-5Please feel free to substitute any swallowable or chewable generic prenatal vitamin covered by this patient's insuranceNormal      ondansetron (ZOFRAN ODT) 4 MG disintegrating tablet Take 1 tablet by mouth every 8 hours as needed for Nausea or Vomiting, Disp-15 tablet, R-0Normal      acetaminophen (TYLENOL) 325 MG tablet Take 2 tablets by mouth every 6 hours as needed for Pain, Disp-30 tablet, R-0Print             PAST MEDICAL HISTORY         Diagnosis Date    Depression     ESBL (extended spectrum beta-lactamase) producing bacteria infection 9/10/2014    E. Coli urine    GDMA1 2021    Marijuana smoker 3/31/2016    Postpartum depression        SURGICAL HISTORY           Procedure Laterality Date     SECTION      x 3     SECTION N/A 2021     SECTION performed by Khoi Oropeza MD at [facility] L&D [address] [place] [address], [facility]      2007    [place]  DELIVERY ONLY N/A 2018     SECTION REPEAT performed by Yusef Valerio DO at [facility] L&D OR         FAMILY HISTORY           Problem Relation Age of Onset    Asthma Mother     Stroke Mother     High Blood Pressure Mother     Diabetes Mother     Depression Mother     Diabetes Maternal Aunt     High Blood Pressure Maternal Aunt         [id number]     Family Status   Relation Name Status    Mother  (Not Specified)    MAunt  (Not Specified)        SOCIAL HISTORY      reports that she has been smoking cigarettes. She has a 1.25 pack-year smoking history. She has never used smokeless tobacco. She reports current drug use. Drugs: Marijuana (Weed) and Cocaine. She reports that she does not drink alcohol. REVIEW OFSYSTEMS    (2-9 systems for level 4, 10 or more for level 5)   Review of Systems    Except as noted above the remainder of the review of systems was reviewed and negative. PHYSICAL EXAM    (up to 7 for level 4, 8 or more for level 5)     ED Triage Vitals   BP Temp Temp Source Pulse Resp SpO2 Height Weight   22 1836 22 1834 22 1834 22 1834 22 1834 22 1834 22 1834 22 1834   91/62 97.5 °F (36.4 °C) Oral 73 16 99 % 5' 7\" (1.702 m) 120 lb (54.4 kg)      Physical Exam  Constitutional:       Appearance: She is well-developed. HENT:      Head: Normocephalic and atraumatic. Cardiovascular:      Rate and Rhythm: Normal rate and regular rhythm. 99%    Weight: 120 lb (54.4 kg)    Height: 5' 7\" (1.702 m)      7:56 PM EST  A and o x 3. Eating. Ambulating. Will dc home. CONSULTS:  None    PROCEDURES:  Procedures        FINAL IMPRESSION      1.  Accidental drug overdose, initial encounter          DISPOSITION/PLAN   DISPOSITION        PATIENTREFERRED TO:   MD Ever Brownilla [address] [facility] [phone]    In 3 days        DISCHARGE MEDICATIONS:     Discharge Medication List as of 3/2/2022  8:13 PM              (Please note that portions of this note were completed with a voice recognition program.  Efforts were made to edit thedictations but occasionally words are mis-transcribed.)    LIZZETH Tubbs PA-C  03/02/22 1264

## 2022-03-03 NOTE — ED PROVIDER NOTES
eMERGENCY dEPARTMENT eNCOUnter   [address] [place] Name: Nguyen Mitchell  MRN: [id number]  [place] 1990  Date of evaluation: 3/2/22     Nguyen Mitchell is a 33 y.o. female with CC: Drug Overdose (smoked marijuana from a new dealer)      This visit was performed by both a physician and an APC. I performed all aspects of the MDM as documented. The care is provided during an unprecedented national emergency due to the novel coronavirus, COVID 19.     Priya Aldridge DO  Attending Emergency Physician                    Jose Armando 1721,   03/02/22 6660

## 2022-10-17 ENCOUNTER — HOSPITAL ENCOUNTER (EMERGENCY)
Age: 32
Discharge: HOME | End: 2022-10-17
Attending: EMERGENCY MEDICINE
Payer: MEDICAID

## 2022-10-17 VITALS
DIASTOLIC BLOOD PRESSURE: 78 MMHG | HEART RATE: 69 BPM | TEMPERATURE: 97.7 F | RESPIRATION RATE: 14 BRPM | HEIGHT: 67 IN | WEIGHT: 100 LBS | BODY MASS INDEX: 15.7 KG/M2 | OXYGEN SATURATION: 99 % | SYSTOLIC BLOOD PRESSURE: 123 MMHG

## 2022-10-17 DIAGNOSIS — S61.411A LACERATION OF RIGHT HAND WITHOUT FOREIGN BODY, INITIAL ENCOUNTER: Primary | ICD-10-CM

## 2022-10-17 PROCEDURE — 99283 EMERGENCY DEPT VISIT LOW MDM: CPT

## 2022-10-17 PROCEDURE — 12002 RPR S/N/AX/GEN/TRNK2.6-7.5CM: CPT

## 2022-10-17 RX ORDER — ONDANSETRON 4 MG/1
4 TABLET, ORALLY DISINTEGRATING ORAL EVERY 12 HOURS PRN
Qty: 14 TABLET | Refills: 0 | Status: SHIPPED | OUTPATIENT
Start: 2022-10-17 | End: 2022-10-24

## 2022-10-17 RX ORDER — CEPHALEXIN 500 MG/1
500 CAPSULE ORAL 2 TIMES DAILY
Qty: 14 CAPSULE | Refills: 0 | Status: SHIPPED | OUTPATIENT
Start: 2022-10-17 | End: 2022-10-24

## 2022-10-17 ASSESSMENT — ENCOUNTER SYMPTOMS
SHORTNESS OF BREATH: 0
ABDOMINAL PAIN: 0

## 2022-10-17 NOTE — ED NOTES
BJORN set up patient's [facility] transportation home. Trip #[id number]. Artur Brito.  [place], [place]  10/17/22 8821

## 2022-10-17 NOTE — ED PROVIDER NOTES
[address] [facility]     Emergency Department     Faculty Note/ Attestation      Pt Name: Marlyn Ballard                                       MRN: [id number]  [place] 1990  Date of evaluation: 10/17/2022    Patients PCP:    Katelyn Self MD      Attestation  I performed a history and physical examination of the patient and discussed management with the resident. I reviewed the residents note and agree with the documented findings and plan of care. Any areas of disagreement are noted on the chart. I was personally present for the key portions of any procedures. I have documented in the chart those procedures where I was not present during the key portions. I have reviewed the emergency nurses triage note. I agree with the chief complaint, past medical history, past surgical history, allergies, medications, social and family history as documented unless otherwise noted below. For Physician Assistant/ Nurse Practitioner cases/documentation I have personally evaluated this patient and have completed at least one if not all key elements of the E/M (history, physical exam, and MDM). Additional findings are as noted.       Initial Screens:        [place] Coma Scale  Eye Opening: Spontaneous  Best Verbal Response: Oriented  Best Motor Response: Obeys commands  [place] Coma Scale Score: 15    Vitals:    Vitals:    10/17/22 1611   BP: 123/78   Pulse: 69   Resp: 14   Temp: 97.7 °F (36.5 °C)   TempSrc: Oral   SpO2: 99%   Weight: 100 lb (45.4 kg)   Height: 5' 7\" (1.702 m)       CHIEF COMPLAINT       Chief Complaint   Patient presents with    Laceration     Lac to right palm on glass, bleeding controlled             DIAGNOSTIC RESULTS             RADIOLOGY:   No orders to display         LABS:  Labs Reviewed - No data to display      EMERGENCY DEPARTMENT COURSE:     -------------------------  BP: 123/78, Temp: 97.7 °F (36.5 °C), Heart Rate: 69, Resp: 14      Comments    Patient cut her hand on glass while taking around the trash, she has a right hand thenar eminence laceration approximately 4 to 5 cm. It is into the muscle belly however no tendon or vessel involvement.   She has full range of motion of the hand, sensation is intact    Irrigated copiously, tetanus up-to-date 1 year ago, will perform complex closure including mattress sutures as well as simple interrupted, prescribed antibiotics as well as topical antibiotic ointment, follow-up with PCP or return to ER for wound check and suture removal in 5 to 7 days    (Please note that portions of this note were completed with a voice recognition program.  Efforts were made to edit the dictations but occasionally words are mis-transcribed.)      Kiki Travis MD,, MD  Attending Emergency Physician         Kiki Travis MD  10/17/22 [phone]

## 2022-10-17 NOTE — ED NOTES
Patient to room 41, ambulatory with EMS  Patient is a 27year old female  Patient complains of having cut right palm on a piece of glass  Dressing intact to right palm, bleeding controlled  NAD  Will continue to assess, call light given to patient       Conrado Cruz RN  10/17/22 2164

## 2022-10-17 NOTE — ED NOTES
Discharge instructions given. Verbalized understanding. All questions answered.   Patient education done explaining Dr. Eduardo Soils order to take Zofran with Keflex  Verbalizes understanding     Norma John RN  10/17/22 6143

## 2022-10-17 NOTE — ED PROVIDER NOTES
[facility]  ED  Emergency Department Encounter  Emergency Medicine Resident     Pt Sarah Maren Davia Cheadle  MRN: [id number]  [place] 1990  Date of evaluation: 10/17/22  PCP:  Konrad Claix MD      [address] [place] [address]       Chief Complaint   Patient presents with    Laceration     Lac to right palm on glass, bleeding controlled       HISTORY OF PRESENT ILLNESS  (Location/Symptom, Timing/Onset, Context/Setting, Quality, Duration, Modifying Factors, Severity.)      Delma Keane is a 27 y.o. female who presents with laceration to R thenar eminence this morning while taking the trash out. Cut herself on a piece of glass that was in the bag. She used peroxide to wash it out and applied gauze which controlled the bleeding. She denies numbness or tingling, no fever. Tetanus updated last year. Patient admits to smoking marijuana prior to arrival. Also smokes tobacco. No EtOH use today. PAST MEDICAL / SURGICAL / SOCIAL / FAMILY HISTORY      has a past medical history of Depression, ESBL (extended spectrum beta-lactamase) producing bacteria infection, GDMA1, Marijuana smoker, and Postpartum depression. has a past surgical history that includes  section; Cholecystectomy; Cholecystectomy, laparoscopic; pr  delivery only (N/A, 2018); and  section (N/A, 2021).       Social History     Socioeconomic History    Marital status: Single     Spouse name: Not on file    Number of children: 5    Years of education: Not on file    Highest education level: Not on file   Occupational History    Not on file   Tobacco Use    Smoking status: Every Day     Packs/day: 0.25     Years: 5.00     Pack years: 1.25     Types: Cigarettes    Smokeless tobacco: Never   Vaping Use    Vaping Use: Never used   Substance and Sexual Activity    Alcohol use: Yes    Drug use: Yes     Types: Marijuana ([place]), Cocaine     Comment: marijuana yesterday, cocaine months ago    Sexual activity: Yes Partners: Male   Other Topics Concern    Not on file   Social History Narrative    Not on file     Social Determinants of Health     Financial Resource Strain: Not on file   Food Insecurity: Not on file   Transportation Needs: Not on file   Physical Activity: Not on file   Stress: Not on file   Social Connections: Not on file   Intimate Partner Violence: Not on file   Housing Stability: Not on file       Family History   Problem Relation Age of Onset    Asthma Mother     Stroke Mother     High Blood Pressure Mother     Diabetes Mother     Depression Mother     Diabetes Maternal Aunt     High Blood Pressure Maternal Aunt         [id number]       Allergies:  Magnesium-containing compounds and Keflex [cephalexin]    Home Medications:  Prior to Admission medications    Medication Sig Start Date End Date Taking?  Authorizing Provider   cephALEXin (KEFLEX) 500 MG capsule Take 1 capsule by mouth 2 times daily for 7 days 10/17/22 10/24/22 Yes Carmela Clark, DO   ondansetron (ZOFRAN ODT) 4 MG disintegrating tablet Take 1 tablet by mouth every 12 hours as needed for Nausea 10/17/22 10/24/22 Yes Carmela Clark, DO   ibuprofen (ADVIL;MOTRIN) 800 MG tablet Take 1 tablet by mouth every 8 hours as needed for Pain or Fever 7/9/21   Fito Yuen, DO   ferrous sulfate (IRON 325) 325 (65 Fe) MG tablet Take 1 tablet by mouth 2 times daily  Patient not taking: Reported on 10/17/2022 7/9/21   Mundo Hardy, DO   loperamide (IMODIUM) 2 MG capsule Take 1 capsule by mouth 4 times daily as needed for Diarrhea  Patient not taking: Reported on 5/4/2021 3/23/21   Ulis Part, DO   ondansetron (ZOFRAN) 4 MG tablet Take 1 tablet by mouth every 8 hours as needed for Nausea or Vomiting  Patient not taking: Reported on 10/17/2022 3/23/21   Ulis Part, DO   Prenatal Vit-Fe Fumarate-FA (PRENATAL VITAMIN) 27-0.8 MG TABS Take 1 tablet by mouth daily  Patient not taking: Reported on 10/17/2022 3/23/21   Ulis Part, DO   ondansetron [facility] ODT) 4 MG disintegrating tablet Take 1 tablet by mouth every 8 hours as needed for Nausea or Vomiting  Patient not taking: Reported on 5/4/2021 2/20/21   Lester Laguna DO   acetaminophen (TYLENOL) 325 MG tablet Take 2 tablets by mouth every 6 hours as needed for Pain  Patient taking differently: Take 650 mg by mouth 8/2/18   Dayanara Oshea, DO       REVIEW OF SYSTEMS    (2-9 systems for level 4, 10 or more for level 5)      Review of Systems   Constitutional:  Negative for chills and fever. Respiratory:  Negative for shortness of breath. Cardiovascular:  Negative for chest pain. Gastrointestinal:  Negative for abdominal pain. Skin:  Positive for wound. Allergic/Immunologic: Negative for immunocompromised state. Neurological:  Negative for weakness and numbness. Hematological:  Does not bruise/bleed easily. PHYSICAL EXAM   (up to 7 for level 4, 8 or more for level 5)      INITIAL VITALS:   /78   Pulse 69   Temp 97.7 °F (36.5 °C) (Oral)   Resp 14   Ht 5' 7\" (1.702 m)   Wt 100 lb (45.4 kg)   SpO2 99%   BMI 15.66 kg/m²     Physical Exam  Constitutional:       General: She is not in acute distress. HENT:      Head: Normocephalic and atraumatic. Eyes:      Extraocular Movements: Extraocular movements intact. Cardiovascular:      Rate and Rhythm: Normal rate and regular rhythm. Heart sounds: Normal heart sounds. Comments: R radial pulse 2+  Pulmonary:      Effort: Pulmonary effort is normal.      Breath sounds: Normal breath sounds. Musculoskeletal:      Comments: R thenar eminence with ~3cm wound  Thenar eminence and thumb sensation intact, ROM intact  Exposed muscle without foreign body on wash out and wound exploration    Skin:     General: Skin is warm. Neurological:      General: No focal deficit present. Mental Status: She is alert.    Psychiatric:         Mood and Affect: Mood normal.       DIFFERENTIAL  DIAGNOSIS     PLAN (LABS / IMAGING / EKG):  No orders of the defined types were placed in this encounter. MEDICATIONS ORDERED:  Orders Placed This Encounter   Medications    cephALEXin (KEFLEX) 500 MG capsule     Sig: Take 1 capsule by mouth 2 times daily for 7 days     Dispense:  14 capsule     Refill:  0    ondansetron (ZOFRAN ODT) 4 MG disintegrating tablet     Sig: Take 1 tablet by mouth every 12 hours as needed for Nausea     Dispense:  14 tablet     Refill:  0       MDM: R thenar eminence laceration that requires sutures. Will clean wound and explore for foreign body. Tetanus updated. Will repair and discharge with abx. DIAGNOSTIC RESULTS / EMERGENCY DEPARTMENT COURSE / MDM   LAB RESULTS:  No results found for this visit on 10/17/22. IMPRESSION: R thenar eminence repaired without complication. Wound exploration without foreign body. Sensation and ROM intact, good cap refill. Radial pulse 2+. Will discharge with keflex and zofran. Discussed return precautions with patient and need for follow up in 1 week for suture removal. Patient vocalized understanding. RADIOLOGY:  No orders to display       EMERGENCY DEPARTMENT COURSE:           No notes of  Admission Criteria type on file. PROCEDURES:  PROCEDURE NOTE - LACERATION CLOSURE    PATIENT NAME: Erick Faustin  MEDICAL RECORD NO. [id number]  DATE: 10/17/2022  ATTENDING PHYSICIAN: Marck Borjas    PREOPERATIVE DIAGNOSIS: Laceration(s) as follows:   LOCATION: right thenar eminence   LENGTH: 3cm   LAYERED CLOSURE: No    POSTOPERATIVE DIAGNOSIS:  Same  PROCEDURE PERFORMED:  Suture closure of laceration  PERFORMING PHYSICIAN: Abelino Trimble DO  ANESTHESIA:  Local utilizing  Lidocaine 1% without epinephrine  ESTIMATED BLOOD LOSS:  Less than 25 ml. COMPLICATIONS:  None immediately appreciated. DISCUSSION:  Erick Faustin is a 27y.o.-year-old female. The history and physical examination were reviewed and confirmed.   The diagnoses, proposed procedure, risks, possible complications, benefits and alternatives were discussed with the patient or family. She was given the opportunity to ask questions, and once answered, informed consent was obtained. The patient was then prepared for the procedure. PROCEDURE:  A timeout was initiated and the procedure and patient were confirmed by those present. The wound area was irrigated with sterile saline and draped in a sterile fashion. The wound area was anesthetized with Lidocaine 1% without epinephrine. The wound was explored with the following results No foreign bodies found. The wound was repaired with 4-0 Prolene. The wound was dressed with bacitracin and a bandage. SUTURE COUNT:  6 sutures were used. No immediate complication was evident. All sponge, instrument and needle counts were correct at the completion of the procedure. Gisel Viramontes DO  6:28 PM, 10/17/22        CONSULTS:  None    CRITICAL CARE:      FINAL IMPRESSION      1.  Laceration of right hand without foreign body, initial encounter          DISPOSITION / PLAN     DISPOSITION Decision To Discharge 10/17/2022 06:00:54 PM      PATIENT REFERRED TO:  Joann Mccarthy MD  [facility] [address] [facility]  [phone]      For suture removal    DISCHARGE MEDICATIONS:  New Prescriptions    CEPHALEXIN (KEFLEX) 500 MG CAPSULE    Take 1 capsule by mouth 2 times daily for 7 days    ONDANSETRON (ZOFRAN ODT) 4 MG DISINTEGRATING TABLET    Take 1 tablet by mouth every 12 hours as needed for Nausea       Gisel Viramontes DO  Emergency Medicine Resident    (Please note that portions of thisnote were completed with a voice recognition program.  Efforts were made to edit the dictations but occasionally words are mis-transcribed.)        Gisel Viramontes DO  Resident  10/17/22 [id number]

## 2022-10-17 NOTE — DISCHARGE INSTRUCTIONS
Javid Calzada!!!    From [facility]    On behalf of the Emergency Department staff at [facility]. [facility] Emergency Department, I would like to thank you for giving Alisa Kincaid the opportunity to address your health care needs and concerns. We hope that during your visit, our service was delivered in a professional and caring manner. Please keep [facility] in mind as we walk with you down the path to your own personal wellness. Please expect an automated phone call from [phone] so we can ask a few questions about your health and progress. Based on your answers, a clinician may call you back to offer help and instructions. If you notice any concerning symptoms please return to the ER immediately. These can include but are not limited to: fevers, chills, shortness of breath, vomiting, weakness of the extremities, changes in your mental status, numbness, pale extremities, or chest pain. Wound care: none. Please keep wound clean and dry, can use neosporin daily. Wash with soap and water. Please return to have your stiches out in 1 week. Medications: Continue taking your home medications as previously directed. For pain You may take tylenol 1,000mg by mouth every 6 hours as needed for pain. Do not exceed 4,000mg per day. If you have liver disease don't take tylenol. You may also take ibuprofen 600mg every 6-8 hours as needed for pain. Do not exceed 2,400 mg per day. If you experience stomach pain or you have a history of kidney disease stop taking ibuprofen. You may alternate application of ice and heat 20 minutes at a time as desired.       Follow up: Please follow up with your primary care doctor within one week or as needed for suture

## 2022-10-18 NOTE — ED NOTES
Pt inquiring where cab was. Pt reports she has been waiting for three hours. SW reviewed chart and pt has only been waiting only 1.30 hours. SW will call insurance again once it has been three hours.       RACQUEL Archuleta  10/17/22 [id number]

## 2022-10-27 ENCOUNTER — HOSPITAL ENCOUNTER (EMERGENCY)
Age: 32
Discharge: HOME | End: 2022-10-27
Attending: EMERGENCY MEDICINE
Payer: MEDICAID

## 2022-10-27 VITALS
TEMPERATURE: 98.5 F | DIASTOLIC BLOOD PRESSURE: 89 MMHG | HEART RATE: 72 BPM | SYSTOLIC BLOOD PRESSURE: 127 MMHG | OXYGEN SATURATION: 100 % | RESPIRATION RATE: 16 BRPM

## 2022-10-27 DIAGNOSIS — Z48.02 VISIT FOR SUTURE REMOVAL: Primary | ICD-10-CM

## 2022-10-27 PROCEDURE — 99282 EMERGENCY DEPT VISIT SF MDM: CPT

## 2022-10-27 ASSESSMENT — ENCOUNTER SYMPTOMS
COLOR CHANGE: 0
EYES NEGATIVE: 1
GASTROINTESTINAL NEGATIVE: 1
RESPIRATORY NEGATIVE: 1

## 2022-10-27 NOTE — ED NOTES
This patient was assessed by the doctor only. Nurse processed and completed the orders from this doctor ie labs, meds, and/or EKG.       Adelfo Monge LPN  07/07/70 [id number]

## 2022-10-27 NOTE — DISCHARGE INSTRUCTIONS
You were seen today for removal of stitches following a laceration to your hand 10 days ago. Incision is well healed without signs of infection. Sutures were removed without incident. Due to your pain we believe it is crucial to follow up with an orthopedic specialist to assess for any underlying issues. Please continue taking over the counter pain medications. If symptoms continue to worsen then please return to the ED.

## 2022-10-27 NOTE — ED PROVIDER NOTES
[facility]     Emergency Department     Faculty Attestation    I performed a history and physical examination of the patient and discussed management with the resident. I reviewed the residents note and agree with the documented findings and plan of care. Any areas of disagreement are noted on the chart. I was personally present for the key portions of any procedures. I have documented in the chart those procedures where I was not present during the key portions. I have reviewed the emergency nurses triage note. I agree with the chief complaint, past medical history, past surgical history, allergies, medications, social and family history as documented unless otherwise noted below. For Physician Assistant/ Nurse Practitioner cases/documentation I have personally evaluated this patient and have completed at least one if not all key elements of the E/M (history, physical exam, and MDM). Additional findings are as noted. Presents for suture removal from her right hand. Patient had the sutures placed 10 days ago after she cut her hand on a piece of glass while taking out the trash. She was discharged on Keflex and she says she did take that medication completely. She says she continues to have some pain to the area. The wound appears to have healed well without any surrounding erythema, warmth, drainage. The wound is well approximated. The resident will remove the sutures and have her follow-up with her primary care physician.       Briana Gillis MD  Attending Emergency  Physician            Isabel Gill MD  10/27/22 3957

## 2022-10-27 NOTE — ED PROVIDER NOTES
[facility] ED  Emergency Department Encounter  Emergency Medicine Resident     Pt Jade Hartman  MRN: [id number]  [place] 1990  Date of evaluation: 10/27/22  PCP:  Rk Tiwari MD      [address] [place] [address]       Chief Complaint   Patient presents with    Suture / Staple Removal     R hand       HISTORY OF PRESENT ILLNESS  (Location/Symptom, Timing/Onset, Context/Setting, Quality, Duration, Modifying Factors, Severity.)      Syed Walker is a 27 y.o. female who presents for suture removal of the right hand. Patient was in the ED 10 days ago for laceration of right hand along the thenar eminence at which time laceration was repaired. No foreign body or debris were noted at that time. Patient states she still has severe pain in the area that has worsened over 10 days. Moderate swelling still present. Patient states there has been no color change or drainage from the laceration site. She has been doing bandage changes with Vaseline. Patient finished her course of oral antibiotics. Patient states she never followed up with PCP after her ED visit. Denies nausea, fever, vomiting, chills, shortness of breath. PAST MEDICAL / SURGICAL / SOCIAL / FAMILY HISTORY      has a past medical history of Depression, ESBL (extended spectrum beta-lactamase) producing bacteria infection, GDMA1, Marijuana smoker, and Postpartum depression. has a past surgical history that includes  section; Cholecystectomy; Cholecystectomy, laparoscopic; pr  delivery only (N/A, 2018); and  section (N/A, 2021).       Social History     Socioeconomic History    Marital status: Single     Spouse name: Not on file    Number of children: 5    Years of education: Not on file    Highest education level: Not on file   Occupational History    Not on file   Tobacco Use    Smoking status: Every Day     Packs/day: 0.25     Years: 5.00     Pack years: 1.25     Types: Cigarettes Smokeless tobacco: Never   Vaping Use    Vaping Use: Never used   Substance and Sexual Activity    Alcohol use: Yes    Drug use: Yes     Types: Marijuana Shalom Hail), Cocaine     Comment: marijuana yesterday, cocaine months ago    Sexual activity: Yes     Partners: Male   Other Topics Concern    Not on file   Social History Narrative    Not on file     Social Determinants of Health     Financial Resource Strain: Not on file   Food Insecurity: Not on file   Transportation Needs: Not on file   Physical Activity: Not on file   Stress: Not on file   Social Connections: Not on file   Intimate Partner Violence: Not on file   Housing Stability: Not on file       Family History   Problem Relation Age of Onset    Asthma Mother     Stroke Mother     High Blood Pressure Mother     Diabetes Mother     Depression Mother     Diabetes Maternal Aunt     High Blood Pressure Maternal Aunt         [id number]       Allergies:  Magnesium-containing compounds and Keflex [cephalexin]    Home Medications:  Prior to Admission medications    Medication Sig Start Date End Date Taking?  Authorizing Provider   ibuprofen (ADVIL;MOTRIN) 800 MG tablet Take 1 tablet by mouth every 8 hours as needed for Pain or Fever 7/9/21   Grant Solis, DO   ferrous sulfate (IRON 325) 325 (65 Fe) MG tablet Take 1 tablet by mouth 2 times daily  Patient not taking: Reported on 10/17/2022 7/9/21   Alex Nixon, DO   loperamide (IMODIUM) 2 MG capsule Take 1 capsule by mouth 4 times daily as needed for Diarrhea  Patient not taking: Reported on 5/4/2021 3/23/21   Zinio, DO   ondansetron (ZOFRAN) 4 MG tablet Take 1 tablet by mouth every 8 hours as needed for Nausea or Vomiting  Patient not taking: Reported on 10/17/2022 3/23/21   Zinio, DO   Prenatal Vit-Fe Fumarate-FA (PRENATAL VITAMIN) 27-0.8 MG TABS Take 1 tablet by mouth daily  Patient not taking: Reported on 10/17/2022 3/23/21   Zinio, DO   ondansetron (ZOFRAN ODT) 4 MG disintegrating tablet Take 1 tablet by mouth every 8 hours as needed for Nausea or Vomiting  Patient not taking: Reported on 5/4/2021 2/20/21   Chilango Cleo, DO   acetaminophen (TYLENOL) 325 MG tablet Take 2 tablets by mouth every 6 hours as needed for Pain  Patient taking differently: Take 650 mg by mouth 8/2/18   Debbie Finley, DO       REVIEW OF SYSTEMS    (2-9 systems for level 4, 10 or more for level 5)      Review of Systems   Constitutional: Negative. HENT: Negative. Eyes: Negative. Respiratory: Negative. Cardiovascular: Negative. Gastrointestinal: Negative. Endocrine: Negative. Genitourinary: Negative. Skin:  Negative for color change and wound. Hematological: Negative. Psychiatric/Behavioral: Negative. PHYSICAL EXAM   (up to 7 for level 4, 8 or more for level 5)      INITIAL VITALS:   /89   Pulse 72   Temp 98.5 °F (36.9 °C) (Oral)   Resp 16   SpO2 100%     Physical Exam  Vitals and nursing note reviewed. Constitutional:       Appearance: Normal appearance. HENT:      Head: Normocephalic. Cardiovascular:      Rate and Rhythm: Normal rate and regular rhythm. Pulses: Normal pulses. Pulmonary:      Effort: Pulmonary effort is normal.   Abdominal:      General: Abdomen is flat. Musculoskeletal:         General: Swelling present. Cervical back: Normal range of motion. Comments: Severe pain to palpation along the thenar eminence. ROM decreased   Skin:     General: Skin is warm. Capillary Refill: Capillary refill takes less than 2 seconds. Findings: Lesion (healed, skin well coapted) present. Comments:  Incision site is well coapted. No drainage or erythema to the area. No signs of wound dehiscence. No signs of infection. Moderate swelling still present. Neurological:      General: No focal deficit present. Mental Status: She is alert.    Psychiatric:         Mood and Affect: Mood normal.         Behavior: Behavior normal. DIFFERENTIAL  DIAGNOSIS     PLAN (LABS / IMAGING / EKG):  Orders Placed This Encounter   Procedures    SUTURE REMOVAL       MEDICATIONS ORDERED:  No orders of the defined types were placed in this encounter. DDX: Hand laceration 10 days ago, skin well coapted    MDM: Skin well coapted. No signs of infection to the area. Sutures were removed without incident. Patient still has severe pain to the area. She did not previously follow-up after the inciting incident. Patient will follow-up with orthopedic services to assess for underlying issues. No imaging or labs taken at this time. DIAGNOSTIC RESULTS / EMERGENCY DEPARTMENT COURSE / MDM   LAB RESULTS:  No results found for this visit on 10/27/22. IMPRESSION: Hand laceration, healed - sutures removed    RADIOLOGY:  No orders to display         EKG  None    All EKG's are interpreted by the Emergency Department Physician who either signs or Co-signs this chart in the absence of a cardiologist.    EMERGENCY DEPARTMENT COURSE:  Skin well coapted. No signs of infection to the area. Sutures were removed without incident. Patient still has severe pain to the area. She did not previously follow-up after the inciting incident. Patient will follow-up with orthopedic services to assess for underlying issues. No imaging taken at this time. No notes of  Admission Criteria type on file.     PROCEDURES:  Suture Removal    Date/Time: 10/27/2022 2:30 PM  Performed by: Blossom Mccormick DPM  Authorized by: Valentin Maurer MD     Consent:     Consent obtained:  Verbal    Consent given by:  Patient    Risks, benefits, and alternatives were discussed: yes      Risks discussed:  Bleeding and wound separation    Alternatives discussed:  Delayed treatment, referral and observation  Universal protocol:     Procedure explained and questions answered to patient or proxy's satisfaction: yes      Relevant documents present and verified: yes      Test results available: no      Imaging studies available: no      Required blood products, implants, devices, and special equipment available: no      Site/side marked: yes      Immediately prior to procedure, a time out was called: yes      Patient identity confirmed:  Verbally with patient  Location:     Location:  Upper extremity    Upper extremity location:  Hand    Hand location:  R thumb  Procedure details:     Wound appearance:  No signs of infection, tender, good wound healing and clean    Number of sutures removed:  6  Post-procedure details:     Post-removal:  Steri-Strips applied    Procedure completion:  Tolerated     CONSULTS:  None    CRITICAL CARE:  None      FINAL IMPRESSION      1.  Visit for suture removal          DISPOSITION / PLAN     DISPOSITION Decision To Discharge 10/27/2022 12:39:18 PM      PATIENT REFERRED TO:  [facility]  [address] [address]  Schedule an appointment as soon as possible for a visit today  For ED follow-up      DISCHARGE MEDICATIONS:  Discharge Medication List as of 10/27/2022 12:43 PM          Idania Moore DPM  Emergency Medicine Resident    (Please note that portions of thisnote were completed with a voice recognition program.  Efforts were made to edit the dictations but occasionally words are mis-transcribed.)       Idania Moore DPM  Resident  10/27/22 [address] [place] DEBI Gomez  Resident  10/27/22 [phone]

## 2022-10-27 NOTE — ED NOTES
Patient presents to ED for suture removal from right hand. Reports sutures were placed 10 days ago. Patient reports continued pain/swelling in hand. Hand visibly swollen, ice pack provided to patient for comfort. Patient a/o x 4 with normal nonlabored respirations. Patient resting on cot with call light in reach and ice on hand.      Suleiman Villavicencio LPN  92/09/81 3194

## 2023-09-18 NOTE — BRIEF OP NOTE
Department of Obstetrics and Gynecology  Obstetrical Brief Operative Report  [facility]    Patient: Nolvia Estrella   : 1990  MRN: [id number]       Acct: [de-identified]   Date of Procedure: 21    Pre-operative Diagnosis: 26 y.o. female [id number] at 38w1d  Cat 2 Tracing Remote from Delivery  Diarrhea  Hx C/S x5  GDMA1  THC Use  Cocaine Use (UDS positive)  BMI 22     Post-operative Diagnosis: Same and live born living female, and right broad ligament hematoma extending to cervix    Procedure: repeat low transverse  section with b/l risk reducing salpingectomy    Surgeon: Dr. Mcdowell Aid): Ledy Eugene,  [facility], PGY [address] PGY 1    Anesthesia: spinal with Duramorph    Information for the patient's :  Morgannine Peon Girl Jose Alberto Santos [[id number]]   female   Birth Weight: 6 lb 3.3 oz (2.815 kg)     Information for the patient's :  Tierra Fernando [[id number]]          Findings:  Live Born 6 lb 3 oz female infant in cephalic presentation with Apgars of 8 at 1 minute and 9 at five minutes, normal appearing uterus tubes and ovaries   Quantitative Blood Loss: pending immediately post-operatively  Total IV Fluids: 2 L  Urine output: 150 clear urine   Drains:  alarcon catheter  Specimens:  placenta sent to pathology, cord blood and cord gases  Instrument and Sponge Count: Correct  Complications: Broad ligament hematoma extending into the cervix, O'leary stitch needed to control bleeding on right uterine artery  Condition: Infant stable, transfer to [address] [place] [address], Mother stable, transfer to post anesthesia recovery    See dictated operative report for full details. Richie Alvarez DO  Ob/Gyn Resident  2021, 4:42 AM    Date: 2021  Time: 3:50 PM    Attending Attestation:   I was present and scrubbed for the entire procedure.     Attending Name: Ena Malone M.D.
No

## 2024-04-28 ENCOUNTER — APPOINTMENT (OUTPATIENT)
Dept: GENERAL RADIOLOGY | Age: 34
End: 2024-04-28
Payer: MEDICAID

## 2024-04-28 ENCOUNTER — HOSPITAL ENCOUNTER (EMERGENCY)
Age: 34
Discharge: HOME | End: 2024-04-28
Attending: EMERGENCY MEDICINE
Payer: MEDICAID

## 2024-04-28 VITALS
TEMPERATURE: 98.9 F | RESPIRATION RATE: 18 BRPM | OXYGEN SATURATION: 98 % | SYSTOLIC BLOOD PRESSURE: 122 MMHG | DIASTOLIC BLOOD PRESSURE: 79 MMHG | HEIGHT: 66 IN | HEART RATE: 77 BPM | WEIGHT: 120 LBS | BODY MASS INDEX: 19.29 KG/M2

## 2024-04-28 DIAGNOSIS — R07.9 CHEST PAIN, UNSPECIFIED TYPE: ICD-10-CM

## 2024-04-28 DIAGNOSIS — B34.9 VIRAL ILLNESS: Primary | ICD-10-CM

## 2024-04-28 LAB
ALBUMIN SERPL-MCNC: 4.2 G/DL (ref 3.5–5.2)
ALBUMIN/GLOB SERPL: 1 {RATIO} (ref 1–2.5)
ALP SERPL-CCNC: 95 U/L (ref 35–104)
ALT SERPL-CCNC: 10 U/L (ref 10–35)
ANION GAP SERPL CALCULATED.3IONS-SCNC: 9 MMOL/L (ref 9–16)
AST SERPL-CCNC: 22 U/L (ref 10–35)
BASOPHILS # BLD: 0 K/UL (ref 0–0.2)
BASOPHILS NFR BLD: 0 % (ref 0–2)
BILIRUB SERPL-MCNC: 0.4 MG/DL (ref 0–1.2)
BUN SERPL-MCNC: 9 MG/DL (ref 6–20)
CALCIUM SERPL-MCNC: 8.7 MG/DL (ref 8.6–10.4)
CHLORIDE SERPL-SCNC: 104 MMOL/L (ref 98–107)
CO2 SERPL-SCNC: 24 MMOL/L (ref 20–31)
CREAT SERPL-MCNC: 0.8 MG/DL (ref 0.5–0.9)
EOSINOPHIL # BLD: 0.09 K/UL (ref 0–0.4)
EOSINOPHILS RELATIVE PERCENT: 2 % (ref 1–4)
ERYTHROCYTE [DISTWIDTH] IN BLOOD BY AUTOMATED COUNT: 13.2 % (ref 11.8–14.4)
FLUAV AG SPEC QL: NEGATIVE
FLUBV AG SPEC QL: NEGATIVE
GFR SERPL CREATININE-BSD FRML MDRD: >90 ML/MIN/1.73M2
GLUCOSE SERPL-MCNC: 87 MG/DL (ref 74–99)
HCT VFR BLD AUTO: 39.8 % (ref 36.3–47.1)
HGB BLD-MCNC: 12.4 G/DL (ref 11.9–15.1)
IMM GRANULOCYTES # BLD AUTO: 0 K/UL (ref 0–0.3)
IMM GRANULOCYTES NFR BLD: 0 %
LYMPHOCYTES NFR BLD: 0.61 K/UL (ref 1–4.8)
LYMPHOCYTES RELATIVE PERCENT: 13 % (ref 24–44)
MCH RBC QN AUTO: 30.7 PG (ref 25.2–33.5)
MCHC RBC AUTO-ENTMCNC: 31.2 G/DL (ref 28.4–34.8)
MCV RBC AUTO: 98.5 FL (ref 82.6–102.9)
MONOCYTES NFR BLD: 0.05 K/UL (ref 0.1–0.8)
MONOCYTES NFR BLD: 1 % (ref 1–7)
MORPHOLOGY: NORMAL
NEUTROPHILS NFR BLD: 84 % (ref 36–66)
NEUTS SEG NFR BLD: 3.95 K/UL (ref 1.8–7.7)
NRBC BLD-RTO: 0 PER 100 WBC
PLATELET # BLD AUTO: ABNORMAL K/UL (ref 138–453)
PLATELET, FLUORESCENCE: ABNORMAL K/UL (ref 138–453)
POTASSIUM SERPL-SCNC: 3.8 MMOL/L (ref 3.7–5.3)
PROT SERPL-MCNC: 7.1 G/DL (ref 6.6–8.7)
RBC # BLD AUTO: 4.04 M/UL (ref 3.95–5.11)
SARS-COV-2 RDRP RESP QL NAA+PROBE: NOT DETECTED
SODIUM SERPL-SCNC: 137 MMOL/L (ref 136–145)
SPECIMEN DESCRIPTION: NORMAL
TROPONIN I SERPL HS-MCNC: 8 NG/L (ref 0–14)
TROPONIN I SERPL HS-MCNC: <6 NG/L (ref 0–14)
WBC OTHER # BLD: 4.7 K/UL (ref 3.5–11.3)

## 2024-04-28 PROCEDURE — 80053 COMPREHEN METABOLIC PANEL: CPT

## 2024-04-28 PROCEDURE — 6370000000 HC RX 637 (ALT 250 FOR IP): Performed by: EMERGENCY MEDICINE

## 2024-04-28 PROCEDURE — 84484 ASSAY OF TROPONIN QUANT: CPT

## 2024-04-28 PROCEDURE — 85055 RETICULATED PLATELET ASSAY: CPT

## 2024-04-28 PROCEDURE — 99285 EMERGENCY DEPT VISIT HI MDM: CPT

## 2024-04-28 PROCEDURE — 85025 COMPLETE CBC W/AUTO DIFF WBC: CPT

## 2024-04-28 PROCEDURE — 87635 SARS-COV-2 COVID-19 AMP PRB: CPT

## 2024-04-28 PROCEDURE — 87804 INFLUENZA ASSAY W/OPTIC: CPT

## 2024-04-28 PROCEDURE — 93005 ELECTROCARDIOGRAM TRACING: CPT | Performed by: EMERGENCY MEDICINE

## 2024-04-28 PROCEDURE — 71046 X-RAY EXAM CHEST 2 VIEWS: CPT

## 2024-04-28 RX ORDER — ACETAMINOPHEN 500 MG
1000 TABLET ORAL ONCE
Status: COMPLETED | OUTPATIENT
Start: 2024-04-28 | End: 2024-04-28

## 2024-04-28 RX ADMIN — ACETAMINOPHEN 1000 MG: 500 TABLET ORAL at 21:01

## 2024-04-28 ASSESSMENT — PAIN SCALES - GENERAL: PAINLEVEL_OUTOF10: 8

## 2024-04-28 ASSESSMENT — PAIN - FUNCTIONAL ASSESSMENT: PAIN_FUNCTIONAL_ASSESSMENT: 0-10

## 2024-04-28 ASSESSMENT — PAIN DESCRIPTION - ORIENTATION: ORIENTATION: MID

## 2024-04-28 ASSESSMENT — PAIN DESCRIPTION - LOCATION: LOCATION: CHEST

## 2024-04-28 ASSESSMENT — PAIN DESCRIPTION - PAIN TYPE: TYPE: ACUTE PAIN

## 2024-04-28 ASSESSMENT — PAIN DESCRIPTION - DESCRIPTORS: DESCRIPTORS: TIGHTNESS

## 2024-04-29 NOTE — ED PROVIDER NOTES
[facility] ED  Emergency Department Encounter  Emergency Medicine Resident     Pt Name:Aparna Cox  MRN: [id number]  Birthdate 1990  Date of evaluation: 24  PCP:  Poonam Blanco MD  Note Started: 9:31 PM EDT      CHIEF COMPLAINT       Chief Complaint   Patient presents with    Chest Pain       HISTORY OF PRESENT ILLNESS  (Location/Symptom, Timing/Onset, Context/Setting, Quality, Duration, Modifying Factors, Severity.)      Aparna Cox is a 33 y.o. female who presents with chest pain, shortness of breath, productive cough and fever.  Patient states that symptoms have been going on for the last 2 days.  She states symptoms are worse today.  She has not taken anything for her symptoms.  Patient does smoke cigarettes.  She states she used cocaine yesterday.  She states she has been having some nausea but no vomiting or diarrhea.  Patient states that cough is productive of yellow phlegm.  She denies any history of asthma or COPD.  She states she does smoke marijuana also.  Patient has no history of blood clots.  Denies any leg swelling.  No recent travel or surgeries.  Patient does state that chest pain is worse with coughing    PAST MEDICAL / SURGICAL / SOCIAL / FAMILY HISTORY      has a past medical history of Depression, ESBL (extended spectrum beta-lactamase) producing bacteria infection, GDMA1, Marijuana smoker, and Postpartum depression.       has a past surgical history that includes  section; Cholecystectomy; Cholecystectomy, laparoscopic; pr  delivery only (N/A, 2018); and  section (N/A, 2021).      Social History     Socioeconomic History    Marital status: Single     Spouse name: Not on file    Number of children: 5    Years of education: Not on file    Highest education level: Not on file   Occupational History    Not on file   Tobacco Use    Smoking status: Every Day     Current packs/day: 0.25     Average packs/day: 0.3 packs/day for 5.0 
reasons turn is agreeable discharge     Heber Joiner MD  04/28/24 1752

## 2024-04-29 NOTE — ED NOTES
Pt to ED 33 via triage c/o chest pain and SOB on exertion. Pt states she has been walking a lot and feels short of breath when she walks for prolonged periods. Pt states she has had a cough for a while as well, unknown if she has been around anyone sick lately but she has been outside for a while now. RR is even and non-labored. Pt placed on full monitor, EKG complete, line established and blood drawn. Resident at the bedside to assess, plan of care ongoing.

## 2024-04-29 NOTE — DISCHARGE INSTRUCTIONS
You were seen today for cough, chest pain.  Your lab work today was normal.  Your chest x-ray was clear with no evidence of pneumonia.  Your heart labs were normal.  Your EKG was normal.  Your COVID and influenza test are negative you have a viral infection. I recommend you continue to use tylenol and motrin for fever and body aches. Continue to stay hydrated, drink a lot of water and Gatorade. I also recommend honey, cough drops, or warm tea for sore throat and warm bath/shower for congestion. Please follow up with PCP if your symptoms worsen.     If you notice any concerning symptoms please return to the ER immediately. These can include but are not limited to: fevers, chills, shortness of breath, vomiting, weakness of the extremities, changes in your mental status, numbness, pale extremities, or chest pain.     Medications: Continue taking your home medications as previously directed. For pain You may take tylenol 1,000mg by mouth every 6 hours as needed for pain. Do not exceed 4,000mg per day. If you have liver disease don't take tylenol. You may also take ibuprofen 600mg every 6-8 hours as needed for pain. Do not exceed 2,400 mg per day. If you experience stomach pain or you have a history of kidney disease stop taking ibuprofen. You may alternate application of ice and heat 20 minutes at a time as desired.      Follow up: Please follow up with your primary care doctor within one week or as needed.

## 2024-04-30 LAB
EKG ATRIAL RATE: 79 BPM
EKG P AXIS: 50 DEGREES
EKG P-R INTERVAL: 158 MS
EKG Q-T INTERVAL: 388 MS
EKG QRS DURATION: 76 MS
EKG QTC CALCULATION (BAZETT): 444 MS
EKG R AXIS: 33 DEGREES
EKG T AXIS: 41 DEGREES
EKG VENTRICULAR RATE: 79 BPM

## 2024-04-30 PROCEDURE — 93010 ELECTROCARDIOGRAM REPORT: CPT | Performed by: INTERNAL MEDICINE

## 2025-03-14 ENCOUNTER — HOSPITAL ENCOUNTER (OUTPATIENT)
Dept: GENERAL RADIOLOGY | Age: 35
Discharge: HOME | End: 2025-03-16
Payer: MEDICAID

## 2025-03-14 ENCOUNTER — HOSPITAL ENCOUNTER (OUTPATIENT)
Age: 35
Discharge: HOME | End: 2025-03-16
Payer: MEDICAID

## 2025-03-14 DIAGNOSIS — J20.9 OBSTRUCTIVE CHRONIC BRONCHITIS WITH ACUTE BRONCHITIS (HCC): ICD-10-CM

## 2025-03-14 DIAGNOSIS — J11.1 FLU: ICD-10-CM

## 2025-03-14 DIAGNOSIS — J44.0 OBSTRUCTIVE CHRONIC BRONCHITIS WITH ACUTE BRONCHITIS (HCC): ICD-10-CM

## 2025-03-14 PROCEDURE — 71046 X-RAY EXAM CHEST 2 VIEWS: CPT

## 2025-07-13 NOTE — DISCHARGE SUMMARY
No care due was identified.  [facility] Embedded Care Due Messages. Reference number: [id number].   7/13/2025 12:21:26 AM CDT   Obstetric Discharge Summary  [facility]    Patient Name: Ernesto Bailey  Patient : 1990  Primary Care Physician: Misa Estrada MD  Admit Date: 10/22/2018    Principal Diagnosis: IUP at 37w1d, admitted for Hypertension of unknown etiology     Her pregnancy has been complicated by:   Patient Active Problem List   Diagnosis    Hx of  x 5    Family history of diabetes mellitus in mother    Marijuana smoker    History of chlamydia    Depression    ?congenital heart defect in previous child    RLTCS 10/14/16 M Ap/9 Wt: 5#12    Hx of  x5    Cocaine abuse    RLTCS 18 M Apg  Wt 6#15    Postpartum state    Lost custody of children    Hypertension, postpartum condition or complication       Infection Present?: No  Hospital Acquired: No    Surgical Operations & Procedures:  Consultations: none    Pertinent Findings & Procedures:   Ernesto Bailey is a 25 y.o. female [id number] at 37w1d admitted for elevated BPs 4 weeks postpartum from  section; BPs noted to be severe range on admission, however, repeat BPs were non-severe, though, elevated. PreE labs wnl on admission, however LFTs were elevated from prior with ALT/AST increasing from  > /. With concomittant cocaine positive UDS, etiology of hypertension, was unknown; patient kept for observation. Repeat PreE labs on HD#2 was wnl. The plan was to watch the blood pressures for a day, but patient left AMA. Patient counseled on risk of elevated blood pressure in post partum period.      Discharge to: Patient left AMA    Readmission planned: no     Recommendations on Discharge:     Medications:      Medication List      CONTINUE taking these medications    acetaminophen 325 MG tablet  Commonly known as:  TYLENOL  Take 2 tablets by mouth every 6 hours as needed for Pain     buPROPion 150 MG extended release tablet  Commonly known as:  WELLBUTRIN XL     ibuprofen 800 MG tablet  Commonly known as:

## (undated) DEVICE — SUTURE VCRL SZ 0 L36IN ABSRB UD L36MM CT-1 1/2 CIR J946H

## (undated) DEVICE — SOLUTION IRRIG 1500ML STRL H2O USP POUR PLAS BTL

## (undated) DEVICE — STERILE LATEX POWDER-FREE SURGICAL GLOVESWITH NITRILE COATING: Brand: PROTEXIS

## (undated) DEVICE — SUTURE MCRYL SZ 0 L36IN ABSRB VLT L48MM CTX 1/2 CIR Y398H

## (undated) DEVICE — SUTURE ABSORBABLE BRAIDED 2-0 CT-1 27 IN UD VICRYL J259H

## (undated) DEVICE — SURGICAL PROCEDURE PACK C SECT B

## (undated) DEVICE — MEDI-VAC YANK SUCT HNDL W/TPRD BULBOUS TIP & NON-CONDUCTIVE: Brand: CARDINAL HEALTH

## (undated) DEVICE — SUTURE VCRL 3-0 L36IN ABSRB VLT CT-1 L36MM 1/2 CIR J344H

## (undated) DEVICE — CATHETERIZATION KIT PEDIATRIC 16 FR 5 CC INDWL INF CTRL

## (undated) DEVICE — CHLORAPREP 26ML ORANGE

## (undated) DEVICE — SURGICAL PROCEDURE PACK C SECT ST CHARLES SCMH

## (undated) DEVICE — SUTURE VCRL SZ 4-0 L27IN ABSRB UD L19MM FS-2 3/8 CIR REV J422H

## (undated) DEVICE — SUTURE PLN GUT SZ 3-0 L27IN ABSRB YELLOWISH TAN L36MM CT-1 842H